# Patient Record
Sex: MALE | Race: WHITE | Employment: OTHER | URBAN - NONMETROPOLITAN AREA
[De-identification: names, ages, dates, MRNs, and addresses within clinical notes are randomized per-mention and may not be internally consistent; named-entity substitution may affect disease eponyms.]

---

## 2017-01-18 ENCOUNTER — OFFICE VISIT (OUTPATIENT)
Dept: FAMILY MEDICINE CLINIC | Age: 67
End: 2017-01-18
Payer: MEDICARE

## 2017-01-18 VITALS
SYSTOLIC BLOOD PRESSURE: 120 MMHG | WEIGHT: 190 LBS | DIASTOLIC BLOOD PRESSURE: 70 MMHG | HEART RATE: 68 BPM | RESPIRATION RATE: 16 BRPM | TEMPERATURE: 97.7 F | BODY MASS INDEX: 28.06 KG/M2

## 2017-01-18 DIAGNOSIS — E78.2 MIXED HYPERLIPIDEMIA: ICD-10-CM

## 2017-01-18 DIAGNOSIS — R74.8 INCREASED LIVER ENZYMES: ICD-10-CM

## 2017-01-18 DIAGNOSIS — I10 ESSENTIAL HYPERTENSION: ICD-10-CM

## 2017-01-18 DIAGNOSIS — G47.00 INSOMNIA, UNSPECIFIED TYPE: ICD-10-CM

## 2017-01-18 DIAGNOSIS — S32.009D CLOSED FRACTURE OF LUMBAR VERTEBRA WITH ROUTINE HEALING, UNSPECIFIED FRACTURE MORPHOLOGY, UNSPECIFIED LUMBAR VERTEBRAL LEVEL, SUBSEQUENT ENCOUNTER: Primary | ICD-10-CM

## 2017-01-18 DIAGNOSIS — Z12.9 CANCER SCREENING: ICD-10-CM

## 2017-01-18 LAB
ALBUMIN SERPL-MCNC: 3.6 G/DL (ref 3.5–5.2)
ALP BLD-CCNC: 78 U/L (ref 40–130)
ALT SERPL-CCNC: 26 U/L (ref 5–41)
ANION GAP SERPL CALCULATED.3IONS-SCNC: 14 MMOL/L (ref 7–19)
AST SERPL-CCNC: 27 U/L (ref 5–40)
BILIRUB SERPL-MCNC: 0.4 MG/DL (ref 0.2–1.2)
BILIRUBIN URINE: NEGATIVE
BLOOD, URINE: NEGATIVE
BUN BLDV-MCNC: 37 MG/DL (ref 8–23)
CALCIUM SERPL-MCNC: 8.8 MG/DL (ref 8.8–10.2)
CHLORIDE BLD-SCNC: 99 MMOL/L (ref 98–111)
CHOLESTEROL, TOTAL: 130 MG/DL (ref 160–199)
CLARITY: CLEAR
CO2: 27 MMOL/L (ref 22–29)
COLOR: YELLOW
CREAT SERPL-MCNC: 1.7 MG/DL (ref 0.5–1.2)
GFR NON-AFRICAN AMERICAN: 40
GLOBULIN: 3.3 G/DL
GLUCOSE BLD-MCNC: 81 MG/DL (ref 74–109)
GLUCOSE URINE: NEGATIVE MG/DL
HCT VFR BLD CALC: 33.6 % (ref 42–52)
HDLC SERPL-MCNC: 26 MG/DL (ref 55–121)
HEMOGLOBIN: 11.1 G/DL (ref 14–18)
HEPATITIS C ANTIBODY INTERPRETATION: NORMAL
KETONES, URINE: NEGATIVE MG/DL
LDL CHOLESTEROL CALCULATED: 59 MG/DL
LEUKOCYTE ESTERASE, URINE: NEGATIVE
MCH RBC QN AUTO: 31.2 PG (ref 27–31)
MCHC RBC AUTO-ENTMCNC: 33 G/DL (ref 33–37)
MCV RBC AUTO: 94.4 FL (ref 80–94)
NITRITE, URINE: NEGATIVE
PDW BLD-RTO: 13.5 % (ref 11.5–14.5)
PH UA: 5.5
PLATELET # BLD: 216 K/UL (ref 130–400)
PMV BLD AUTO: 10.1 FL (ref 7.4–10.4)
POTASSIUM SERPL-SCNC: 4.7 MMOL/L (ref 3.5–5)
PROSTATE SPECIFIC ANTIGEN: 7.45 NG/ML (ref 0–4)
PROTEIN UA: NEGATIVE MG/DL
RBC # BLD: 3.56 M/UL (ref 4.7–6.1)
SODIUM BLD-SCNC: 140 MMOL/L (ref 136–145)
SPECIFIC GRAVITY UA: 1.02
TOTAL PROTEIN: 6.9 G/DL (ref 6.6–8.7)
TRIGL SERPL-MCNC: 223 MG/DL (ref 150–199)
UROBILINOGEN, URINE: 1 E.U./DL
WBC # BLD: 5.3 K/UL (ref 4.8–10.8)

## 2017-01-18 PROCEDURE — 99214 OFFICE O/P EST MOD 30 MIN: CPT | Performed by: FAMILY MEDICINE

## 2017-01-18 PROCEDURE — 93000 ELECTROCARDIOGRAM COMPLETE: CPT | Performed by: FAMILY MEDICINE

## 2017-01-18 RX ORDER — ZOLPIDEM TARTRATE 10 MG/1
10 TABLET ORAL NIGHTLY PRN
Qty: 30 TABLET | Refills: 1 | Status: SHIPPED | OUTPATIENT
Start: 2017-01-18 | End: 2017-04-27 | Stop reason: SDUPTHER

## 2017-01-18 RX ORDER — METHOCARBAMOL 500 MG/1
500 TABLET, FILM COATED ORAL 3 TIMES DAILY
Qty: 30 TABLET | Refills: 0 | Status: SHIPPED | OUTPATIENT
Start: 2017-01-18 | End: 2017-01-28

## 2017-01-18 ASSESSMENT — ENCOUNTER SYMPTOMS
EYES NEGATIVE: 1
RESPIRATORY NEGATIVE: 1
ALLERGIC/IMMUNOLOGIC NEGATIVE: 1
BACK PAIN: 1
GASTROINTESTINAL NEGATIVE: 1

## 2017-01-19 DIAGNOSIS — N28.9 RENAL INSUFFICIENCY: Primary | ICD-10-CM

## 2017-02-01 RX ORDER — LOSARTAN POTASSIUM 100 MG/1
TABLET ORAL
Qty: 30 TABLET | Refills: 3 | Status: SHIPPED | OUTPATIENT
Start: 2017-02-01 | End: 2017-05-26 | Stop reason: SDUPTHER

## 2017-02-01 RX ORDER — ATORVASTATIN CALCIUM 40 MG/1
TABLET, FILM COATED ORAL
Qty: 30 TABLET | Refills: 3 | Status: SHIPPED | OUTPATIENT
Start: 2017-02-01 | End: 2017-07-25 | Stop reason: SDUPTHER

## 2017-04-07 ENCOUNTER — APPOINTMENT (OUTPATIENT)
Dept: MRI IMAGING | Facility: HOSPITAL | Age: 67
End: 2017-04-07

## 2017-04-07 ENCOUNTER — APPOINTMENT (OUTPATIENT)
Dept: CT IMAGING | Facility: HOSPITAL | Age: 67
End: 2017-04-07

## 2017-04-07 ENCOUNTER — APPOINTMENT (OUTPATIENT)
Dept: NUCLEAR MEDICINE | Facility: HOSPITAL | Age: 67
End: 2017-04-07

## 2017-04-07 ENCOUNTER — HOSPITAL ENCOUNTER (EMERGENCY)
Facility: HOSPITAL | Age: 67
Discharge: HOME | End: 2017-04-07
Attending: EMERGENCY MEDICINE | Admitting: EMERGENCY MEDICINE

## 2017-04-07 ENCOUNTER — APPOINTMENT (OUTPATIENT)
Dept: GENERAL RADIOLOGY | Facility: HOSPITAL | Age: 67
End: 2017-04-07

## 2017-04-07 VITALS
DIASTOLIC BLOOD PRESSURE: 76 MMHG | BODY MASS INDEX: 27.92 KG/M2 | RESPIRATION RATE: 16 BRPM | HEIGHT: 70 IN | OXYGEN SATURATION: 97 % | SYSTOLIC BLOOD PRESSURE: 131 MMHG | TEMPERATURE: 98 F | HEART RATE: 80 BPM | WEIGHT: 195 LBS

## 2017-04-07 DIAGNOSIS — R79.89 ELEVATED SERUM CREATININE: ICD-10-CM

## 2017-04-07 DIAGNOSIS — R79.9 ELEVATED BUN: Primary | ICD-10-CM

## 2017-04-07 DIAGNOSIS — N28.9 ACUTE RENAL INSUFFICIENCY: ICD-10-CM

## 2017-04-07 DIAGNOSIS — M54.42 CHRONIC MIDLINE LOW BACK PAIN WITH BILATERAL SCIATICA: ICD-10-CM

## 2017-04-07 DIAGNOSIS — G89.29 CHRONIC MIDLINE LOW BACK PAIN WITH BILATERAL SCIATICA: ICD-10-CM

## 2017-04-07 DIAGNOSIS — M54.41 CHRONIC MIDLINE LOW BACK PAIN WITH BILATERAL SCIATICA: ICD-10-CM

## 2017-04-07 LAB
ALBUMIN SERPL-MCNC: 3.6 G/DL (ref 3.5–5)
ALBUMIN/GLOB SERPL: 1.2 G/DL (ref 1.1–2.5)
ALP SERPL-CCNC: 69 U/L (ref 24–120)
ALT SERPL W P-5'-P-CCNC: 30 U/L (ref 0–54)
ANION GAP SERPL CALCULATED.3IONS-SCNC: 7 MMOL/L (ref 4–13)
APTT PPP: 39.9 SECONDS (ref 24.1–34.8)
AST SERPL-CCNC: 43 U/L (ref 7–45)
BASOPHILS # BLD AUTO: 0.02 10*3/MM3 (ref 0–0.2)
BASOPHILS NFR BLD AUTO: 0.4 % (ref 0–2)
BILIRUB SERPL-MCNC: 0.6 MG/DL (ref 0.1–1)
BILIRUB UR QL STRIP: NEGATIVE
BUN BLD-MCNC: 48 MG/DL (ref 5–21)
BUN/CREAT SERPL: 25.5 (ref 7–25)
CALCIUM SPEC-SCNC: 9.1 MG/DL (ref 8.4–10.4)
CHLORIDE SERPL-SCNC: 98 MMOL/L (ref 98–110)
CK MB SERPL-CCNC: 7.49 NG/ML (ref 0–5)
CK MB SERPL-RTO: 2.7 % (ref 0–5.7)
CK SERPL-CCNC: 273 U/L (ref 0–203)
CLARITY UR: CLEAR
CO2 SERPL-SCNC: 35 MMOL/L (ref 24–31)
COLOR UR: YELLOW
CREAT BLD-MCNC: 1.88 MG/DL (ref 0.5–1.4)
D DIMER PPP FEU-MCNC: 0.6 MG/L (FEU) (ref 0–0.5)
DEPRECATED RDW RBC AUTO: 50 FL (ref 40–54)
EOSINOPHIL # BLD AUTO: 0.14 10*3/MM3 (ref 0–0.7)
EOSINOPHIL NFR BLD AUTO: 2.7 % (ref 0–4)
ERYTHROCYTE [DISTWIDTH] IN BLOOD BY AUTOMATED COUNT: 14.9 % (ref 12–15)
GFR SERPL CREATININE-BSD FRML MDRD: 36 ML/MIN/1.73
GLOBULIN UR ELPH-MCNC: 3.1 GM/DL
GLUCOSE BLD-MCNC: 112 MG/DL (ref 70–100)
GLUCOSE UR STRIP-MCNC: NEGATIVE MG/DL
HCT VFR BLD AUTO: 32.7 % (ref 40–52)
HGB BLD-MCNC: 10.6 G/DL (ref 14–18)
HGB UR QL STRIP.AUTO: NEGATIVE
HOLD SPECIMEN: NORMAL
HOLD SPECIMEN: NORMAL
IMM GRANULOCYTES # BLD: 0.01 10*3/MM3 (ref 0–0.03)
IMM GRANULOCYTES NFR BLD: 0.2 % (ref 0–5)
INR PPP: 1.06 (ref 0.91–1.09)
KETONES UR QL STRIP: NEGATIVE
LEUKOCYTE ESTERASE UR QL STRIP.AUTO: NEGATIVE
LYMPHOCYTES # BLD AUTO: 1.22 10*3/MM3 (ref 0.72–4.86)
LYMPHOCYTES NFR BLD AUTO: 23.3 % (ref 15–45)
MAGNESIUM SERPL-MCNC: 1.8 MG/DL (ref 1.4–2.2)
MCH RBC QN AUTO: 30.5 PG (ref 28–32)
MCHC RBC AUTO-ENTMCNC: 32.4 G/DL (ref 33–36)
MCV RBC AUTO: 94.2 FL (ref 82–95)
MONOCYTES # BLD AUTO: 0.36 10*3/MM3 (ref 0.19–1.3)
MONOCYTES NFR BLD AUTO: 6.9 % (ref 4–12)
MYOGLOBIN SERPL-MCNC: 224.6 NG/ML (ref 0–110)
NEUTROPHILS # BLD AUTO: 3.48 10*3/MM3 (ref 1.87–8.4)
NEUTROPHILS NFR BLD AUTO: 66.5 % (ref 39–78)
NITRITE UR QL STRIP: NEGATIVE
NRBC BLD MANUAL-RTO: 0 /100 WBC (ref 0–0)
NT-PROBNP SERPL-MCNC: 113 PG/ML (ref 0–900)
PH UR STRIP.AUTO: <=5 [PH] (ref 5–8)
PLATELET # BLD AUTO: 117 10*3/MM3 (ref 130–400)
PMV BLD AUTO: 10.3 FL (ref 6–12)
POTASSIUM BLD-SCNC: 4.2 MMOL/L (ref 3.5–5.3)
PROT SERPL-MCNC: 6.7 G/DL (ref 6.3–8.7)
PROT UR QL STRIP: NEGATIVE
PROTHROMBIN TIME: 14.1 SECONDS (ref 11.9–14.6)
RBC # BLD AUTO: 3.47 10*6/MM3 (ref 4.8–5.9)
RBC MORPH BLD: NORMAL
SMALL PLATELETS BLD QL SMEAR: NORMAL
SODIUM BLD-SCNC: 140 MMOL/L (ref 135–145)
SP GR UR STRIP: 1.02 (ref 1–1.03)
TROPONIN I SERPL-MCNC: 0 NG/ML (ref 0–0.07)
TROPONIN I SERPL-MCNC: <0.012 NG/ML (ref 0–0.03)
UROBILINOGEN UR QL STRIP: NORMAL
WBC MORPH BLD: NORMAL
WBC NRBC COR # BLD: 5.23 10*3/MM3 (ref 4.8–10.8)
WHOLE BLOOD HOLD SPECIMEN: NORMAL
WHOLE BLOOD HOLD SPECIMEN: NORMAL

## 2017-04-07 PROCEDURE — 85610 PROTHROMBIN TIME: CPT | Performed by: EMERGENCY MEDICINE

## 2017-04-07 PROCEDURE — 0 TECHNETIUM ALBUMIN AGGREGATED: Performed by: EMERGENCY MEDICINE

## 2017-04-07 PROCEDURE — 85025 COMPLETE CBC W/AUTO DIFF WBC: CPT | Performed by: EMERGENCY MEDICINE

## 2017-04-07 PROCEDURE — 70450 CT HEAD/BRAIN W/O DYE: CPT

## 2017-04-07 PROCEDURE — 0 GADOBENATE DIMEGLUMINE 529 MG/ML SOLUTION: Performed by: EMERGENCY MEDICINE

## 2017-04-07 PROCEDURE — 83880 ASSAY OF NATRIURETIC PEPTIDE: CPT | Performed by: EMERGENCY MEDICINE

## 2017-04-07 PROCEDURE — 84484 ASSAY OF TROPONIN QUANT: CPT

## 2017-04-07 PROCEDURE — 72158 MRI LUMBAR SPINE W/O & W/DYE: CPT

## 2017-04-07 PROCEDURE — 82550 ASSAY OF CK (CPK): CPT | Performed by: EMERGENCY MEDICINE

## 2017-04-07 PROCEDURE — 85007 BL SMEAR W/DIFF WBC COUNT: CPT | Performed by: EMERGENCY MEDICINE

## 2017-04-07 PROCEDURE — 81003 URINALYSIS AUTO W/O SCOPE: CPT | Performed by: EMERGENCY MEDICINE

## 2017-04-07 PROCEDURE — A9558 XE133 XENON 10MCI: HCPCS | Performed by: EMERGENCY MEDICINE

## 2017-04-07 PROCEDURE — 85379 FIBRIN DEGRADATION QUANT: CPT | Performed by: EMERGENCY MEDICINE

## 2017-04-07 PROCEDURE — 83735 ASSAY OF MAGNESIUM: CPT | Performed by: EMERGENCY MEDICINE

## 2017-04-07 PROCEDURE — 93010 ELECTROCARDIOGRAM REPORT: CPT | Performed by: INTERNAL MEDICINE

## 2017-04-07 PROCEDURE — 72131 CT LUMBAR SPINE W/O DYE: CPT

## 2017-04-07 PROCEDURE — 83874 ASSAY OF MYOGLOBIN: CPT | Performed by: EMERGENCY MEDICINE

## 2017-04-07 PROCEDURE — 93005 ELECTROCARDIOGRAM TRACING: CPT | Performed by: EMERGENCY MEDICINE

## 2017-04-07 PROCEDURE — 80053 COMPREHEN METABOLIC PANEL: CPT | Performed by: EMERGENCY MEDICINE

## 2017-04-07 PROCEDURE — 99284 EMERGENCY DEPT VISIT MOD MDM: CPT

## 2017-04-07 PROCEDURE — 0 XENON XE 133: Performed by: EMERGENCY MEDICINE

## 2017-04-07 PROCEDURE — 72125 CT NECK SPINE W/O DYE: CPT

## 2017-04-07 PROCEDURE — 71010 HC CHEST PA OR AP: CPT

## 2017-04-07 PROCEDURE — A9577 INJ MULTIHANCE: HCPCS | Performed by: EMERGENCY MEDICINE

## 2017-04-07 PROCEDURE — 85730 THROMBOPLASTIN TIME PARTIAL: CPT | Performed by: EMERGENCY MEDICINE

## 2017-04-07 PROCEDURE — 84484 ASSAY OF TROPONIN QUANT: CPT | Performed by: EMERGENCY MEDICINE

## 2017-04-07 PROCEDURE — 82553 CREATINE MB FRACTION: CPT | Performed by: EMERGENCY MEDICINE

## 2017-04-07 PROCEDURE — A9540 TC99M MAA: HCPCS | Performed by: EMERGENCY MEDICINE

## 2017-04-07 PROCEDURE — 78582 LUNG VENTILAT&PERFUS IMAGING: CPT

## 2017-04-07 RX ORDER — LOSARTAN POTASSIUM 50 MG/1
100 TABLET ORAL DAILY
COMMUNITY
End: 2018-03-07 | Stop reason: HOSPADM

## 2017-04-07 RX ORDER — CHLORTHALIDONE 25 MG/1
25 TABLET ORAL DAILY
Status: ON HOLD | COMMUNITY
End: 2018-03-02

## 2017-04-07 RX ORDER — CELECOXIB 200 MG/1
200 CAPSULE ORAL DAILY
Status: ON HOLD | COMMUNITY
End: 2018-03-02

## 2017-04-07 RX ORDER — POTASSIUM CHLORIDE 750 MG/1
10 CAPSULE, EXTENDED RELEASE ORAL 2 TIMES DAILY
COMMUNITY
End: 2018-03-07 | Stop reason: HOSPADM

## 2017-04-07 RX ORDER — OXYCODONE HYDROCHLORIDE 15 MG/1
15 TABLET ORAL EVERY 8 HOURS PRN
COMMUNITY
End: 2018-03-02 | Stop reason: HOSPADM

## 2017-04-07 RX ORDER — ALPRAZOLAM 1 MG/1
1 TABLET ORAL DAILY
COMMUNITY
End: 2019-03-08

## 2017-04-07 RX ORDER — GABAPENTIN 300 MG/1
900 CAPSULE ORAL NIGHTLY
COMMUNITY
End: 2018-06-19 | Stop reason: ALTCHOICE

## 2017-04-07 RX ORDER — MELOXICAM 15 MG/1
15 TABLET ORAL DAILY
Status: ON HOLD | COMMUNITY
End: 2018-02-27

## 2017-04-07 RX ORDER — ATORVASTATIN CALCIUM 40 MG/1
40 TABLET, FILM COATED ORAL DAILY
COMMUNITY
End: 2018-03-08

## 2017-04-07 RX ADMIN — Medication 19.8 MILLICURIE: at 15:47

## 2017-04-07 RX ADMIN — GADOBENATE DIMEGLUMINE 17 ML: 529 INJECTION, SOLUTION INTRAVENOUS at 18:20

## 2017-04-07 RX ADMIN — Medication 1 DOSE: at 15:52

## 2017-04-07 NOTE — ED PROVIDER NOTES
Subjective   HPI Comments: Patient is a 66-year-old male with chronic low back pain which chronically radiates to both legs.  He reports he also has chronic shortness of breath.  Patient reports that since yesterday at about 4 PM his shortness of breath has been worse and he has had generalized weakness.  He reports that all this is worsened by walking and relieved by nothing.      History provided by:  Patient      Review of Systems   HENT: Negative.    Eyes: Negative.    Respiratory: Positive for shortness of breath (Chronic but worse since yesterday.). Negative for apnea, cough, choking and chest tightness.    Cardiovascular: Negative.    Gastrointestinal: Negative.    Endocrine: Negative.    Genitourinary: Negative.    Musculoskeletal: Positive for back pain (Chronic low back pain that radiates to both legs.).        Patient denies bowel/bladder dysfunction, etc.   Skin: Negative.    Allergic/Immunologic: Negative.    Neurological: Positive for weakness (Chronic weakness of left lower extremity. Generalized weakness since yesterday.).   Hematological: Negative.    Psychiatric/Behavioral: Negative.    All other systems reviewed and are negative.      Past Medical History:   Diagnosis Date   • Injury of back    • Neck injury        Allergies   Allergen Reactions   • Codeine Itching       Past Surgical History:   Procedure Laterality Date   • BACK SURGERY         History reviewed. No pertinent family history.    Social History     Social History   • Marital status: Single     Spouse name: N/A   • Number of children: N/A   • Years of education: N/A     Social History Main Topics   • Smoking status: Never Smoker   • Smokeless tobacco: None   • Alcohol use Yes   • Drug use: No   • Sexual activity: Not Asked     Other Topics Concern   • None     Social History Narrative   • None           Objective   Physical Exam   Constitutional: He is oriented to person, place, and time. He appears well-developed and well-nourished.    Elderly appearing male.   HENT:   Head: Normocephalic and atraumatic.   Right Ear: External ear normal.   Left Ear: External ear normal.   Nose: Nose normal.   Mouth/Throat: Oropharynx is clear and moist.   Eyes: Conjunctivae and EOM are normal. Pupils are equal, round, and reactive to light. Right eye exhibits no discharge. Left eye exhibits no discharge.   Neck: Normal range of motion. Neck supple. No tracheal deviation present. No thyromegaly present.   Cardiovascular: Normal rate, regular rhythm, normal heart sounds and intact distal pulses.    No murmur heard.  Pulmonary/Chest: Effort normal and breath sounds normal. No respiratory distress. He exhibits no tenderness.   Abdominal: Soft. He exhibits no distension. There is no tenderness.   Musculoskeletal: He exhibits tenderness (Very mild pain to palpation of the midline of the lower lumbar spine.). He exhibits no edema or deformity.   Mild generalized weakness.   Neurological: He is alert and oriented to person, place, and time. No cranial nerve deficit.   Skin: Skin is warm and dry. No erythema. No pallor.   Psychiatric: He has a normal mood and affect. Judgment normal.   Nursing note and vitals reviewed.      ECG 12 Lead    Date/Time: 4/7/2017 2:01 PM  Performed by: SOTO WU  Authorized by: SOTO WU   Interpreted by physician  Rhythm: sinus rhythm  BPM: 89  QRS axis: left        ECG 12 Lead    Date/Time: 4/7/2017 8:46 PM  Performed by: SOTO WU  Authorized by: SOTO WU   Interpreted by physician  Rhythm: sinus rhythm  BPM: 79  Comments: PACs               ED Course  ED Course   Comment By Time   Patient's case is is been discussed with Dr. Virgil Ibanez (neurosurgeon) who has reviewed the patient's MRI of the lumbar spine.  Dr. Ibanez recommended the patient follow-up with his previous surgeon or pain management as Dr. Ibanez does not feel like the patient has any immediate or urgent surgery needed. Soto  Howard Fisher MD 04/07 2133   Patient was given the option to be admitted for further evaluation and IV fluids given his elevated BUN/creatinine, etc.  Patient reports that he wants to be discharged home at this time and he definitely does not want to be admitted to this hospital at this emergency department visit. Soto Fisher MD 04/07 2143                  MDM  Number of Diagnoses or Management Options  Acute renal insufficiency: new and requires workup  Chronic midline low back pain with bilateral sciatica: established and worsening  Elevated BUN: new and requires workup  Elevated serum creatinine: new and requires workup     Amount and/or Complexity of Data Reviewed  Clinical lab tests: ordered and reviewed  Tests in the radiology section of CPT®: ordered and reviewed  Discuss the patient with other providers: yes    Risk of Complications, Morbidity, and/or Mortality  Presenting problems: moderate  Diagnostic procedures: moderate  Management options: low    Patient Progress  Patient progress: stable      Final diagnoses:   Elevated BUN   Elevated serum creatinine   Acute renal insufficiency   Chronic midline low back pain with bilateral sciatica            Soto Fisher MD  04/07/17 2609

## 2017-04-07 NOTE — ED NOTES
Patient attempted to urinate using urinal with no success.     Cristela Castro RN  04/07/17 7256

## 2017-04-19 DIAGNOSIS — N28.9 RENAL INSUFFICIENCY: ICD-10-CM

## 2017-04-19 LAB
ANION GAP SERPL CALCULATED.3IONS-SCNC: 16 MMOL/L (ref 7–19)
BUN BLDV-MCNC: 28 MG/DL (ref 8–23)
CALCIUM SERPL-MCNC: 9.1 MG/DL (ref 8.8–10.2)
CHLORIDE BLD-SCNC: 102 MMOL/L (ref 98–111)
CO2: 25 MMOL/L (ref 22–29)
CREAT SERPL-MCNC: 1.4 MG/DL (ref 0.5–1.2)
GFR NON-AFRICAN AMERICAN: 51
GLUCOSE BLD-MCNC: 85 MG/DL (ref 74–109)
POTASSIUM SERPL-SCNC: 4.4 MMOL/L (ref 3.5–5)
SODIUM BLD-SCNC: 143 MMOL/L (ref 136–145)

## 2017-05-15 ENCOUNTER — OFFICE VISIT (OUTPATIENT)
Dept: FAMILY MEDICINE CLINIC | Age: 67
End: 2017-05-15
Payer: MEDICARE

## 2017-05-15 VITALS
DIASTOLIC BLOOD PRESSURE: 70 MMHG | OXYGEN SATURATION: 97 % | TEMPERATURE: 98.2 F | WEIGHT: 192 LBS | SYSTOLIC BLOOD PRESSURE: 122 MMHG | HEART RATE: 79 BPM | BODY MASS INDEX: 28.35 KG/M2

## 2017-05-15 DIAGNOSIS — N18.4 CKD (CHRONIC KIDNEY DISEASE), STAGE 4 (SEVERE): ICD-10-CM

## 2017-05-15 DIAGNOSIS — R97.20 INCREASED PROSTATE SPECIFIC ANTIGEN (PSA) VELOCITY: ICD-10-CM

## 2017-05-15 DIAGNOSIS — Z12.11 COLON CANCER SCREENING: ICD-10-CM

## 2017-05-15 DIAGNOSIS — G89.4 CHRONIC PAIN SYNDROME: ICD-10-CM

## 2017-05-15 DIAGNOSIS — K59.03 DRUG-INDUCED CONSTIPATION: Primary | ICD-10-CM

## 2017-05-15 DIAGNOSIS — F41.9 ANXIETY: ICD-10-CM

## 2017-05-15 DIAGNOSIS — G47.00 INSOMNIA, UNSPECIFIED TYPE: ICD-10-CM

## 2017-05-15 LAB
ALBUMIN SERPL-MCNC: 4.4 G/DL (ref 3.5–5.2)
ALP BLD-CCNC: 115 U/L (ref 40–130)
ALT SERPL-CCNC: 17 U/L (ref 5–41)
ANION GAP SERPL CALCULATED.3IONS-SCNC: 12 MMOL/L (ref 7–19)
AST SERPL-CCNC: 25 U/L (ref 5–40)
BILIRUB SERPL-MCNC: 0.7 MG/DL (ref 0.2–1.2)
BUN BLDV-MCNC: 28 MG/DL (ref 8–23)
CALCIUM SERPL-MCNC: 9.1 MG/DL (ref 8.8–10.2)
CHLORIDE BLD-SCNC: 98 MMOL/L (ref 98–111)
CO2: 27 MMOL/L (ref 22–29)
CREAT SERPL-MCNC: 1.1 MG/DL (ref 0.5–1.2)
GFR NON-AFRICAN AMERICAN: >60
GLOBULIN: 3 G/DL
GLUCOSE BLD-MCNC: 95 MG/DL (ref 74–109)
HCT VFR BLD CALC: 41.1 % (ref 42–52)
HEMOGLOBIN: 13.6 G/DL (ref 14–18)
MCH RBC QN AUTO: 31.3 PG (ref 27–31)
MCHC RBC AUTO-ENTMCNC: 33.1 G/DL (ref 33–37)
MCV RBC AUTO: 94.7 FL (ref 80–94)
PDW BLD-RTO: 13.6 % (ref 11.5–14.5)
PLATELET # BLD: 161 K/UL (ref 130–400)
PMV BLD AUTO: 10.4 FL (ref 7.4–10.4)
POTASSIUM SERPL-SCNC: 4.6 MMOL/L (ref 3.5–5)
PROSTATE SPECIFIC ANTIGEN: 5.67 NG/ML (ref 0–4)
RBC # BLD: 4.34 M/UL (ref 4.7–6.1)
SODIUM BLD-SCNC: 137 MMOL/L (ref 136–145)
TOTAL PROTEIN: 7.4 G/DL (ref 6.6–8.7)
WBC # BLD: 5.8 K/UL (ref 4.8–10.8)

## 2017-05-15 PROCEDURE — 99214 OFFICE O/P EST MOD 30 MIN: CPT | Performed by: FAMILY MEDICINE

## 2017-05-15 RX ORDER — ZOLPIDEM TARTRATE 10 MG/1
TABLET ORAL
Qty: 30 TABLET | Refills: 1 | Status: SHIPPED | OUTPATIENT
Start: 2017-05-15 | End: 2017-05-15 | Stop reason: ALTCHOICE

## 2017-05-15 ASSESSMENT — ENCOUNTER SYMPTOMS
RESPIRATORY NEGATIVE: 1
ALLERGIC/IMMUNOLOGIC NEGATIVE: 1
EYES NEGATIVE: 1
CONSTIPATION: 1

## 2017-05-25 DIAGNOSIS — R97.20 ELEVATED PSA: Primary | ICD-10-CM

## 2017-06-06 ENCOUNTER — TELEPHONE (OUTPATIENT)
Dept: UROLOGY | Facility: CLINIC | Age: 67
End: 2017-06-06

## 2017-06-06 NOTE — TELEPHONE ENCOUNTER
CALLED PT TO SCHEDULE APT.  PT STATES THAT HE JUST HAD SX AND WAS NOT ABLE TO MAKE APT RIGHT NOW.  HE WILL CALL TO RS APT WHEN HE IS FEELING BETTER.

## 2017-07-25 RX ORDER — ATORVASTATIN CALCIUM 40 MG/1
TABLET, FILM COATED ORAL
Qty: 30 TABLET | Refills: 5 | Status: SHIPPED | OUTPATIENT
Start: 2017-07-25 | End: 2018-03-09 | Stop reason: ALTCHOICE

## 2017-07-25 RX ORDER — POTASSIUM CHLORIDE 750 MG/1
TABLET, FILM COATED, EXTENDED RELEASE ORAL
Qty: 60 TABLET | Refills: 5 | Status: SHIPPED | OUTPATIENT
Start: 2017-07-25 | End: 2018-03-09 | Stop reason: ALTCHOICE

## 2017-08-09 ENCOUNTER — HOSPITAL ENCOUNTER (OUTPATIENT)
Dept: GENERAL RADIOLOGY | Age: 67
Discharge: HOME | End: 2017-08-09
Payer: MEDICARE

## 2017-08-09 ENCOUNTER — OFFICE VISIT (OUTPATIENT)
Dept: FAMILY MEDICINE CLINIC | Age: 67
End: 2017-08-09
Payer: MEDICARE

## 2017-08-09 VITALS
DIASTOLIC BLOOD PRESSURE: 60 MMHG | OXYGEN SATURATION: 96 % | HEART RATE: 100 BPM | TEMPERATURE: 97.5 F | SYSTOLIC BLOOD PRESSURE: 120 MMHG | BODY MASS INDEX: 29.53 KG/M2 | WEIGHT: 200 LBS

## 2017-08-09 DIAGNOSIS — R60.0 BILATERAL EDEMA OF LOWER EXTREMITY: ICD-10-CM

## 2017-08-09 DIAGNOSIS — G47.00 INSOMNIA, UNSPECIFIED TYPE: ICD-10-CM

## 2017-08-09 DIAGNOSIS — R97.20 INCREASED PROSTATE SPECIFIC ANTIGEN (PSA) VELOCITY: ICD-10-CM

## 2017-08-09 DIAGNOSIS — Z79.899 MEDICATION MANAGEMENT: ICD-10-CM

## 2017-08-09 DIAGNOSIS — K59.00 CONSTIPATION, UNSPECIFIED CONSTIPATION TYPE: Primary | ICD-10-CM

## 2017-08-09 LAB
ALBUMIN SERPL-MCNC: 3.6 G/DL (ref 3.5–5.2)
ALP BLD-CCNC: 115 U/L (ref 40–130)
ALT SERPL-CCNC: 13 U/L (ref 5–41)
AMPHETAMINE SCREEN, URINE: ABNORMAL
ANION GAP SERPL CALCULATED.3IONS-SCNC: 15 MMOL/L (ref 7–19)
AST SERPL-CCNC: 25 U/L (ref 5–40)
BARBITURATE SCREEN, URINE: ABNORMAL
BENZODIAZEPINE SCREEN, URINE: POSITIVE
BILIRUB SERPL-MCNC: 0.4 MG/DL (ref 0.2–1.2)
BUN BLDV-MCNC: 21 MG/DL (ref 8–23)
CALCIUM SERPL-MCNC: 9.2 MG/DL (ref 8.8–10.2)
CHLORIDE BLD-SCNC: 97 MMOL/L (ref 98–111)
CO2: 29 MMOL/L (ref 22–29)
COCAINE METABOLITE SCREEN URINE: ABNORMAL
CREAT SERPL-MCNC: 1.6 MG/DL (ref 0.5–1.2)
GFR NON-AFRICAN AMERICAN: 43
GLUCOSE BLD-MCNC: 124 MG/DL (ref 74–109)
HCT VFR BLD CALC: 34.4 % (ref 42–52)
HEMOGLOBIN: 10.9 G/DL (ref 14–18)
MCH RBC QN AUTO: 31.2 PG (ref 27–31)
MCHC RBC AUTO-ENTMCNC: 31.7 G/DL (ref 33–37)
MCV RBC AUTO: 98.6 FL (ref 80–94)
MDMA URINE: ABNORMAL
METHADONE SCREEN, URINE: ABNORMAL
METHAMPHETAMINE, URINE: ABNORMAL
OPIATE SCREEN URINE: ABNORMAL
OXYCODONE SCREEN URINE: POSITIVE
PDW BLD-RTO: 14.9 % (ref 11.5–14.5)
PHENCYCLIDINE SCREEN URINE: ABNORMAL
PLATELET # BLD: 161 K/UL (ref 130–400)
PMV BLD AUTO: 10 FL (ref 9.4–12.4)
POTASSIUM SERPL-SCNC: 4.5 MMOL/L (ref 3.5–5)
PROPOXYPHENE SCREEN, URINE: ABNORMAL
RBC # BLD: 3.49 M/UL (ref 4.7–6.1)
SODIUM BLD-SCNC: 141 MMOL/L (ref 136–145)
THC: ABNORMAL
TOTAL PROTEIN: 6.9 G/DL (ref 6.6–8.7)
TRICYCLIC ANTIDEPRESSANTS, UR: ABNORMAL
WBC # BLD: 4.6 K/UL (ref 4.8–10.8)

## 2017-08-09 PROCEDURE — 80305 DRUG TEST PRSMV DIR OPT OBS: CPT | Performed by: FAMILY MEDICINE

## 2017-08-09 PROCEDURE — 93971 EXTREMITY STUDY: CPT

## 2017-08-09 PROCEDURE — 99214 OFFICE O/P EST MOD 30 MIN: CPT | Performed by: FAMILY MEDICINE

## 2017-08-09 RX ORDER — LACTULOSE 10 G/15ML
10 SOLUTION ORAL; RECTAL 2 TIMES DAILY
Qty: 2700 ML | Refills: 5 | Status: SHIPPED | OUTPATIENT
Start: 2017-08-09 | End: 2017-09-22

## 2017-08-09 RX ORDER — FUROSEMIDE 40 MG/1
40 TABLET ORAL DAILY
Qty: 30 TABLET | Refills: 1 | Status: SHIPPED | OUTPATIENT
Start: 2017-08-09 | End: 2017-11-20 | Stop reason: SDUPTHER

## 2017-08-09 RX ORDER — ZOLPIDEM TARTRATE 5 MG/1
5 TABLET ORAL NIGHTLY PRN
Qty: 30 TABLET | Refills: 0 | Status: SHIPPED | OUTPATIENT
Start: 2017-08-09 | End: 2018-02-07 | Stop reason: ALTCHOICE

## 2017-08-09 ASSESSMENT — ENCOUNTER SYMPTOMS
CONSTIPATION: 1
ALLERGIC/IMMUNOLOGIC NEGATIVE: 1
RESPIRATORY NEGATIVE: 1
EYES NEGATIVE: 1

## 2017-08-16 ENCOUNTER — OFFICE VISIT (OUTPATIENT)
Dept: FAMILY MEDICINE CLINIC | Age: 67
End: 2017-08-16
Payer: MEDICARE

## 2017-08-16 ENCOUNTER — HOSPITAL ENCOUNTER (OUTPATIENT)
Dept: GENERAL RADIOLOGY | Age: 67
Discharge: HOME | End: 2017-08-16
Payer: MEDICARE

## 2017-08-16 VITALS
DIASTOLIC BLOOD PRESSURE: 68 MMHG | HEIGHT: 70 IN | BODY MASS INDEX: 27.49 KG/M2 | RESPIRATION RATE: 16 BRPM | WEIGHT: 192 LBS | TEMPERATURE: 98 F | SYSTOLIC BLOOD PRESSURE: 112 MMHG | HEART RATE: 94 BPM | OXYGEN SATURATION: 94 %

## 2017-08-16 DIAGNOSIS — I82.890 SUPERFICIAL VEIN THROMBOSIS: ICD-10-CM

## 2017-08-16 DIAGNOSIS — R60.9 PERIPHERAL EDEMA: ICD-10-CM

## 2017-08-16 DIAGNOSIS — K59.03 DRUG-INDUCED CONSTIPATION: Primary | ICD-10-CM

## 2017-08-16 LAB
ANION GAP SERPL CALCULATED.3IONS-SCNC: 14 MMOL/L (ref 7–19)
BUN BLDV-MCNC: 25 MG/DL (ref 8–23)
CALCIUM SERPL-MCNC: 8.7 MG/DL (ref 8.8–10.2)
CHLORIDE BLD-SCNC: 97 MMOL/L (ref 98–111)
CO2: 27 MMOL/L (ref 22–29)
CREAT SERPL-MCNC: 1.7 MG/DL (ref 0.5–1.2)
GFR NON-AFRICAN AMERICAN: 40
GLUCOSE BLD-MCNC: 167 MG/DL (ref 74–109)
POTASSIUM SERPL-SCNC: 4.2 MMOL/L (ref 3.5–5)
SODIUM BLD-SCNC: 138 MMOL/L (ref 136–145)

## 2017-08-16 PROCEDURE — 93971 EXTREMITY STUDY: CPT

## 2017-08-16 PROCEDURE — 99214 OFFICE O/P EST MOD 30 MIN: CPT | Performed by: FAMILY MEDICINE

## 2017-08-16 ASSESSMENT — ENCOUNTER SYMPTOMS
CONSTIPATION: 1
RESPIRATORY NEGATIVE: 1
EYES NEGATIVE: 1
ALLERGIC/IMMUNOLOGIC NEGATIVE: 1

## 2017-08-25 DIAGNOSIS — N28.9 RENAL INSUFFICIENCY: Primary | ICD-10-CM

## 2017-08-29 RX ORDER — POTASSIUM CHLORIDE 750 MG/1
TABLET, FILM COATED, EXTENDED RELEASE ORAL
Qty: 60 TABLET | Refills: 5 | Status: SHIPPED | OUTPATIENT
Start: 2017-08-29 | End: 2017-09-22 | Stop reason: SDUPTHER

## 2017-09-22 ENCOUNTER — OFFICE VISIT (OUTPATIENT)
Dept: FAMILY MEDICINE CLINIC | Age: 67
End: 2017-09-22
Payer: MEDICARE

## 2017-09-22 VITALS
RESPIRATION RATE: 16 BRPM | HEART RATE: 75 BPM | TEMPERATURE: 98 F | SYSTOLIC BLOOD PRESSURE: 138 MMHG | WEIGHT: 193 LBS | BODY MASS INDEX: 27.69 KG/M2 | DIASTOLIC BLOOD PRESSURE: 80 MMHG | OXYGEN SATURATION: 95 %

## 2017-09-22 DIAGNOSIS — H61.23 BILATERAL IMPACTED CERUMEN: Primary | ICD-10-CM

## 2017-09-22 DIAGNOSIS — H92.03 OTALGIA OF BOTH EARS: ICD-10-CM

## 2017-09-22 PROCEDURE — 99213 OFFICE O/P EST LOW 20 MIN: CPT | Performed by: NURSE PRACTITIONER

## 2017-09-22 ASSESSMENT — ENCOUNTER SYMPTOMS: CONSTIPATION: 1

## 2017-10-31 ENCOUNTER — NURSE ONLY (OUTPATIENT)
Dept: FAMILY MEDICINE CLINIC | Age: 67
End: 2017-10-31
Payer: MEDICARE

## 2017-10-31 DIAGNOSIS — Z23 NEED FOR INFLUENZA VACCINATION: Primary | ICD-10-CM

## 2017-10-31 PROCEDURE — 90662 IIV NO PRSV INCREASED AG IM: CPT | Performed by: FAMILY MEDICINE

## 2017-10-31 PROCEDURE — G0008 ADMIN INFLUENZA VIRUS VAC: HCPCS | Performed by: FAMILY MEDICINE

## 2017-10-31 NOTE — PROGRESS NOTES
Flu shot only. No S/S of illness noted. Denies fever or allergy to egg products. No problem with previous vaccines. VIS given to patient.

## 2017-11-20 DIAGNOSIS — R60.0 BILATERAL EDEMA OF LOWER EXTREMITY: ICD-10-CM

## 2017-11-20 RX ORDER — LOSARTAN POTASSIUM 100 MG/1
TABLET ORAL
Qty: 30 TABLET | Refills: 5 | Status: SHIPPED | OUTPATIENT
Start: 2017-11-20 | End: 2018-03-09 | Stop reason: ALTCHOICE

## 2017-11-20 RX ORDER — FUROSEMIDE 40 MG/1
40 TABLET ORAL DAILY
Qty: 30 TABLET | Refills: 5 | Status: SHIPPED | OUTPATIENT
Start: 2017-11-20 | End: 2018-03-09 | Stop reason: ALTCHOICE

## 2018-02-07 ENCOUNTER — OFFICE VISIT (OUTPATIENT)
Dept: FAMILY MEDICINE CLINIC | Age: 68
End: 2018-02-07
Payer: MEDICARE

## 2018-02-07 VITALS
HEART RATE: 68 BPM | SYSTOLIC BLOOD PRESSURE: 114 MMHG | WEIGHT: 181 LBS | DIASTOLIC BLOOD PRESSURE: 68 MMHG | TEMPERATURE: 98.2 F | RESPIRATION RATE: 18 BRPM | BODY MASS INDEX: 25.91 KG/M2 | OXYGEN SATURATION: 97 % | HEIGHT: 70 IN

## 2018-02-07 DIAGNOSIS — I10 ESSENTIAL HYPERTENSION: ICD-10-CM

## 2018-02-07 DIAGNOSIS — M25.511 CHRONIC RIGHT SHOULDER PAIN: ICD-10-CM

## 2018-02-07 DIAGNOSIS — G89.29 CHRONIC RIGHT SHOULDER PAIN: ICD-10-CM

## 2018-02-07 DIAGNOSIS — G47.00 INSOMNIA, UNSPECIFIED TYPE: Primary | ICD-10-CM

## 2018-02-07 LAB
AMPHETAMINE SCREEN, URINE: ABNORMAL
BARBITURATE SCREEN, URINE: ABNORMAL
BENZODIAZEPINE SCREEN, URINE: ABNORMAL
COCAINE METABOLITE SCREEN URINE: ABNORMAL
MDMA URINE: ABNORMAL
METHADONE SCREEN, URINE: ABNORMAL
METHAMPHETAMINE, URINE: ABNORMAL
OPIATE SCREEN URINE: ABNORMAL
OXYCODONE SCREEN URINE: ABNORMAL
PHENCYCLIDINE SCREEN URINE: ABNORMAL
PROPOXYPHENE SCREEN, URINE: ABNORMAL
THC: ABNORMAL
TRICYCLIC ANTIDEPRESSANTS, UR: ABNORMAL

## 2018-02-07 PROCEDURE — 80305 DRUG TEST PRSMV DIR OPT OBS: CPT | Performed by: FAMILY MEDICINE

## 2018-02-07 PROCEDURE — 99214 OFFICE O/P EST MOD 30 MIN: CPT | Performed by: FAMILY MEDICINE

## 2018-02-07 PROCEDURE — 20605 DRAIN/INJ JOINT/BURSA W/O US: CPT | Performed by: FAMILY MEDICINE

## 2018-02-07 RX ORDER — ZOLPIDEM TARTRATE 5 MG/1
5 TABLET ORAL NIGHTLY PRN
Qty: 30 TABLET | Refills: 0 | Status: CANCELLED | OUTPATIENT
Start: 2018-02-07

## 2018-02-07 RX ORDER — ZOLPIDEM TARTRATE 10 MG/1
10 TABLET ORAL NIGHTLY PRN
Qty: 30 TABLET | Refills: 0 | Status: SHIPPED | OUTPATIENT
Start: 2018-02-07 | End: 2018-03-09 | Stop reason: ALTCHOICE

## 2018-02-07 RX ORDER — TERAZOSIN 1 MG/1
1 CAPSULE ORAL 2 TIMES DAILY
COMMUNITY
Start: 2018-01-25 | End: 2018-03-09 | Stop reason: ALTCHOICE

## 2018-02-07 ASSESSMENT — ENCOUNTER SYMPTOMS
EYES NEGATIVE: 1
GASTROINTESTINAL NEGATIVE: 1
ALLERGIC/IMMUNOLOGIC NEGATIVE: 1
RESPIRATORY NEGATIVE: 1

## 2018-02-07 NOTE — PROGRESS NOTES
Subjective:      Patient ID: Leonardo Darden is a 79 y.o. male. Chief Complaint   Patient presents with    Shoulder Pain     Right shoulder pain - Devaughn Klinefelter a few weeks ago    Insomnia     Requesting a refill on Ambien      Patient here for follow-up of multiple medical problems  Insomnia  Patient have problems going and staying asleep. He takes Ambien as needed. Last time I prescribe her Ambien 5. He stated that Ambien 5 doesn't help as much as Ambien 10. Request for me to increase his Ambien to 10 mg. Is being a week see he took his last pill. Medication is effective. Denies medication side effects. Right shoulder pain  Patient have chronic right shoulder pain. He had decreased range of motion of his right shoulder. He had the same problem the left shoulder. His pain management doctor inject his left shoulder and he feels better. He asked me if I can inject his right shoulder. He had this problem for years. He have multiple injuries on his right shoulder  Hypertension  Patient have hypertension. He takes blood pressure medication. He also have chronic kidney disease. He sees nephrology. Last time he saw nephrology was in November. His blood pressure is well-controlled. Is compliant with medication. Denies medication side effects. Review of Systems   Constitutional: Negative. HENT: Negative. Eyes: Negative. Respiratory: Negative. Cardiovascular: Negative. Gastrointestinal: Negative. Endocrine: Negative. Genitourinary: Negative. Musculoskeletal:        Right Shoulder pain   Skin: Negative. Allergic/Immunologic: Negative. Neurological: Negative. Hematological: Negative. Psychiatric/Behavioral: Positive for sleep disturbance. Objective:   Physical Exam   Constitutional: He is oriented to person, place, and time. He appears well-developed and well-nourished. HENT:   Head: Normocephalic and atraumatic.    Right Ear: External ear normal.   Left Ear: External ear normal.

## 2018-02-27 ENCOUNTER — APPOINTMENT (OUTPATIENT)
Dept: CT IMAGING | Facility: HOSPITAL | Age: 68
End: 2018-02-27

## 2018-02-27 ENCOUNTER — APPOINTMENT (OUTPATIENT)
Dept: GENERAL RADIOLOGY | Facility: HOSPITAL | Age: 68
End: 2018-02-27

## 2018-02-27 ENCOUNTER — APPOINTMENT (OUTPATIENT)
Dept: MRI IMAGING | Facility: HOSPITAL | Age: 68
End: 2018-02-27

## 2018-02-27 ENCOUNTER — HOSPITAL ENCOUNTER (INPATIENT)
Facility: HOSPITAL | Age: 68
LOS: 3 days | Discharge: HOME HEALTH | End: 2018-03-02
Attending: NEUROLOGICAL SURGERY | Admitting: NEUROLOGICAL SURGERY

## 2018-02-27 ENCOUNTER — ANESTHESIA (OUTPATIENT)
Dept: PERIOP | Facility: HOSPITAL | Age: 68
End: 2018-02-27

## 2018-02-27 ENCOUNTER — PREP FOR SURGERY (OUTPATIENT)
Dept: OTHER | Facility: HOSPITAL | Age: 68
End: 2018-02-27

## 2018-02-27 ENCOUNTER — ANESTHESIA EVENT (OUTPATIENT)
Dept: PERIOP | Facility: HOSPITAL | Age: 68
End: 2018-02-27

## 2018-02-27 DIAGNOSIS — S12.110A ODONTOID FRACTURE WITH TYPE II MORPHOLOGY (HCC): ICD-10-CM

## 2018-02-27 DIAGNOSIS — Z74.09 IMPAIRED MOBILITY AND ADLS: ICD-10-CM

## 2018-02-27 DIAGNOSIS — Z74.09 IMPAIRED MOBILITY: ICD-10-CM

## 2018-02-27 DIAGNOSIS — Z78.9 IMPAIRED MOBILITY AND ADLS: ICD-10-CM

## 2018-02-27 DIAGNOSIS — S12.110A ODONTOID FRACTURE WITH TYPE II MORPHOLOGY (HCC): Primary | ICD-10-CM

## 2018-02-27 LAB
ABO GROUP BLD: NORMAL
ALBUMIN SERPL-MCNC: 3.5 G/DL (ref 3.5–5)
ALBUMIN/GLOB SERPL: 1.3 G/DL (ref 1.1–2.5)
ALP SERPL-CCNC: 134 U/L (ref 24–120)
ALT SERPL W P-5'-P-CCNC: 29 U/L (ref 0–54)
ANION GAP SERPL CALCULATED.3IONS-SCNC: 10 MMOL/L (ref 4–13)
APTT PPP: 32.7 SECONDS (ref 24.1–34.8)
AST SERPL-CCNC: 33 U/L (ref 7–45)
BASOPHILS # BLD AUTO: 0.03 10*3/MM3 (ref 0–0.2)
BASOPHILS NFR BLD AUTO: 0.5 % (ref 0–2)
BILIRUB SERPL-MCNC: 0.5 MG/DL (ref 0.1–1)
BLD GP AB SCN SERPL QL: NEGATIVE
BUN BLD-MCNC: 27 MG/DL (ref 5–21)
BUN/CREAT SERPL: 21.1 (ref 7–25)
CALCIUM SPEC-SCNC: 8.8 MG/DL (ref 8.4–10.4)
CHLORIDE SERPL-SCNC: 98 MMOL/L (ref 98–110)
CO2 SERPL-SCNC: 31 MMOL/L (ref 24–31)
CREAT BLD-MCNC: 1.28 MG/DL (ref 0.5–1.4)
DEPRECATED RDW RBC AUTO: 45.5 FL (ref 40–54)
EOSINOPHIL # BLD AUTO: 0.08 10*3/MM3 (ref 0–0.7)
EOSINOPHIL NFR BLD AUTO: 1.2 % (ref 0–4)
ERYTHROCYTE [DISTWIDTH] IN BLOOD BY AUTOMATED COUNT: 14.2 % (ref 12–15)
GFR SERPL CREATININE-BSD FRML MDRD: 56 ML/MIN/1.73
GLOBULIN UR ELPH-MCNC: 2.8 GM/DL
GLUCOSE BLD-MCNC: 148 MG/DL (ref 70–100)
HCT VFR BLD AUTO: 23.6 % (ref 40–52)
HCT VFR BLD AUTO: 31.5 % (ref 40–52)
HGB BLD-MCNC: 10.8 G/DL (ref 14–18)
HGB BLD-MCNC: 8.2 G/DL (ref 14–18)
IMM GRANULOCYTES # BLD: 0.02 10*3/MM3 (ref 0–0.03)
IMM GRANULOCYTES NFR BLD: 0.3 % (ref 0–5)
INR PPP: 1.07 (ref 0.91–1.09)
LYMPHOCYTES # BLD AUTO: 1.41 10*3/MM3 (ref 0.72–4.86)
LYMPHOCYTES NFR BLD AUTO: 21.6 % (ref 15–45)
MCH RBC QN AUTO: 30.7 PG (ref 28–32)
MCHC RBC AUTO-ENTMCNC: 34.3 G/DL (ref 33–36)
MCV RBC AUTO: 89.5 FL (ref 82–95)
MONOCYTES # BLD AUTO: 0.42 10*3/MM3 (ref 0.19–1.3)
MONOCYTES NFR BLD AUTO: 6.4 % (ref 4–12)
NEUTROPHILS # BLD AUTO: 4.56 10*3/MM3 (ref 1.87–8.4)
NEUTROPHILS NFR BLD AUTO: 70 % (ref 39–78)
NRBC BLD MANUAL-RTO: 0 /100 WBC (ref 0–0)
PLATELET # BLD AUTO: 114 10*3/MM3 (ref 130–400)
PMV BLD AUTO: 9.9 FL (ref 6–12)
POTASSIUM BLD-SCNC: 4.3 MMOL/L (ref 3.5–5.3)
PROT SERPL-MCNC: 6.3 G/DL (ref 6.3–8.7)
PROTHROMBIN TIME: 14.2 SECONDS (ref 11.9–14.6)
RBC # BLD AUTO: 3.52 10*6/MM3 (ref 4.8–5.9)
RH BLD: POSITIVE
SODIUM BLD-SCNC: 139 MMOL/L (ref 135–145)
WBC NRBC COR # BLD: 6.52 10*3/MM3 (ref 4.8–10.8)

## 2018-02-27 PROCEDURE — 86900 BLOOD TYPING SEROLOGIC ABO: CPT | Performed by: NEUROLOGICAL SURGERY

## 2018-02-27 PROCEDURE — 72125 CT NECK SPINE W/O DYE: CPT

## 2018-02-27 PROCEDURE — C1713 ANCHOR/SCREW BN/BN,TIS/BN: HCPCS | Performed by: NEUROLOGICAL SURGERY

## 2018-02-27 PROCEDURE — 85018 HEMOGLOBIN: CPT | Performed by: NEUROLOGICAL SURGERY

## 2018-02-27 PROCEDURE — 93005 ELECTROCARDIOGRAM TRACING: CPT | Performed by: NEUROLOGICAL SURGERY

## 2018-02-27 PROCEDURE — 76380 CAT SCAN FOLLOW-UP STUDY: CPT

## 2018-02-27 PROCEDURE — 85014 HEMATOCRIT: CPT | Performed by: NEUROLOGICAL SURGERY

## 2018-02-27 PROCEDURE — 25010000002 MORPHINE SULFATE (PF) 2 MG/ML SOLUTION: Performed by: NEUROLOGICAL SURGERY

## 2018-02-27 PROCEDURE — 86901 BLOOD TYPING SEROLOGIC RH(D): CPT | Performed by: NEUROLOGICAL SURGERY

## 2018-02-27 PROCEDURE — 25010000002 HYDROMORPHONE PER 4 MG: Performed by: ANESTHESIOLOGY

## 2018-02-27 PROCEDURE — 0RG2071 FUSION OF 2 OR MORE CERVICAL VERTEBRAL JOINTS WITH AUTOLOGOUS TISSUE SUBSTITUTE, POSTERIOR APPROACH, POSTERIOR COLUMN, OPEN APPROACH: ICD-10-PCS | Performed by: NEUROLOGICAL SURGERY

## 2018-02-27 PROCEDURE — 85610 PROTHROMBIN TIME: CPT | Performed by: NEUROLOGICAL SURGERY

## 2018-02-27 PROCEDURE — 72040 X-RAY EXAM NECK SPINE 2-3 VW: CPT

## 2018-02-27 PROCEDURE — 25010000002 SUCCINYLCHOLINE PER 20 MG: Performed by: NURSE ANESTHETIST, CERTIFIED REGISTERED

## 2018-02-27 PROCEDURE — 72141 MRI NECK SPINE W/O DYE: CPT

## 2018-02-27 PROCEDURE — 80053 COMPREHEN METABOLIC PANEL: CPT | Performed by: NEUROLOGICAL SURGERY

## 2018-02-27 PROCEDURE — 25010000002 PROPOFOL 10 MG/ML EMULSION: Performed by: NURSE ANESTHETIST, CERTIFIED REGISTERED

## 2018-02-27 PROCEDURE — 99223 1ST HOSP IP/OBS HIGH 75: CPT | Performed by: NEUROLOGICAL SURGERY

## 2018-02-27 PROCEDURE — 94799 UNLISTED PULMONARY SVC/PX: CPT

## 2018-02-27 PROCEDURE — 86850 RBC ANTIBODY SCREEN: CPT | Performed by: NEUROLOGICAL SURGERY

## 2018-02-27 PROCEDURE — 76000 FLUOROSCOPY <1 HR PHYS/QHP: CPT

## 2018-02-27 PROCEDURE — 25010000002 MIDAZOLAM PER 1 MG: Performed by: ANESTHESIOLOGY

## 2018-02-27 PROCEDURE — 85730 THROMBOPLASTIN TIME PARTIAL: CPT | Performed by: NEUROLOGICAL SURGERY

## 2018-02-27 PROCEDURE — 93010 ELECTROCARDIOGRAM REPORT: CPT | Performed by: INTERNAL MEDICINE

## 2018-02-27 PROCEDURE — 63001 REMOVE SPINE LAMINA 1/2 CRVL: CPT | Performed by: NEUROLOGICAL SURGERY

## 2018-02-27 PROCEDURE — 22842 INSERT SPINE FIXATION DEVICE: CPT | Performed by: NEUROLOGICAL SURGERY

## 2018-02-27 PROCEDURE — 85025 COMPLETE CBC W/AUTO DIFF WBC: CPT | Performed by: NEUROLOGICAL SURGERY

## 2018-02-27 PROCEDURE — 25010000002 PROPOFOL 1000 MG/ML EMULSION: Performed by: NURSE ANESTHETIST, CERTIFIED REGISTERED

## 2018-02-27 DEVICE — SCREW 6958712 3.5 X 12MM MAS
Type: IMPLANTABLE DEVICE | Site: SPINE CERVICAL | Status: FUNCTIONAL
Brand: VERTEX® RECONSTRUCTION SYSTEM

## 2018-02-27 DEVICE — PUTTY DBM PROGENIX PLS 5CC: Type: IMPLANTABLE DEVICE | Site: SPINE CERVICAL | Status: FUNCTIONAL

## 2018-02-27 DEVICE — BONE GRAFT KIT 7510200 INFUSE SMALL
Type: IMPLANTABLE DEVICE | Site: SPINE CERVICAL | Status: FUNCTIONAL
Brand: INFUSE® BONE GRAFT

## 2018-02-27 RX ORDER — LABETALOL HYDROCHLORIDE 5 MG/ML
5 INJECTION, SOLUTION INTRAVENOUS
Status: DISCONTINUED | OUTPATIENT
Start: 2018-02-27 | End: 2018-02-27 | Stop reason: HOSPADM

## 2018-02-27 RX ORDER — ONDANSETRON 2 MG/ML
4 INJECTION INTRAMUSCULAR; INTRAVENOUS EVERY 6 HOURS PRN
Status: DISCONTINUED | OUTPATIENT
Start: 2018-02-27 | End: 2018-03-02 | Stop reason: HOSPADM

## 2018-02-27 RX ORDER — NALOXONE HCL 0.4 MG/ML
0.04 VIAL (ML) INJECTION AS NEEDED
Status: DISCONTINUED | OUTPATIENT
Start: 2018-02-27 | End: 2018-02-27 | Stop reason: HOSPADM

## 2018-02-27 RX ORDER — GABAPENTIN 300 MG/1
300 CAPSULE ORAL 3 TIMES DAILY
Status: DISCONTINUED | OUTPATIENT
Start: 2018-02-27 | End: 2018-03-02 | Stop reason: HOSPADM

## 2018-02-27 RX ORDER — MORPHINE SULFATE 2 MG/ML
2 INJECTION, SOLUTION INTRAMUSCULAR; INTRAVENOUS AS NEEDED
Status: DISCONTINUED | OUTPATIENT
Start: 2018-02-27 | End: 2018-02-27 | Stop reason: HOSPADM

## 2018-02-27 RX ORDER — BISACODYL 10 MG
10 SUPPOSITORY, RECTAL RECTAL DAILY PRN
Status: DISCONTINUED | OUTPATIENT
Start: 2018-02-27 | End: 2018-03-02 | Stop reason: HOSPADM

## 2018-02-27 RX ORDER — NALOXONE HCL 0.4 MG/ML
0.4 VIAL (ML) INJECTION
Status: DISCONTINUED | OUTPATIENT
Start: 2018-02-27 | End: 2018-02-28

## 2018-02-27 RX ORDER — SODIUM CHLORIDE 0.9 % (FLUSH) 0.9 %
1-10 SYRINGE (ML) INJECTION AS NEEDED
Status: DISCONTINUED | OUTPATIENT
Start: 2018-02-27 | End: 2018-03-02 | Stop reason: HOSPADM

## 2018-02-27 RX ORDER — SUCCINYLCHOLINE CHLORIDE 20 MG/ML
INJECTION INTRAMUSCULAR; INTRAVENOUS AS NEEDED
Status: DISCONTINUED | OUTPATIENT
Start: 2018-02-27 | End: 2018-02-27 | Stop reason: SURG

## 2018-02-27 RX ORDER — ATORVASTATIN CALCIUM 40 MG/1
40 TABLET, FILM COATED ORAL DAILY
Status: DISCONTINUED | OUTPATIENT
Start: 2018-02-27 | End: 2018-03-02 | Stop reason: HOSPADM

## 2018-02-27 RX ORDER — SODIUM CHLORIDE, SODIUM LACTATE, POTASSIUM CHLORIDE, CALCIUM CHLORIDE 600; 310; 30; 20 MG/100ML; MG/100ML; MG/100ML; MG/100ML
20 INJECTION, SOLUTION INTRAVENOUS CONTINUOUS
Status: DISCONTINUED | OUTPATIENT
Start: 2018-02-27 | End: 2018-02-28

## 2018-02-27 RX ORDER — MORPHINE SULFATE 2 MG/ML
2 INJECTION, SOLUTION INTRAMUSCULAR; INTRAVENOUS EVERY 4 HOURS PRN
Status: DISCONTINUED | OUTPATIENT
Start: 2018-02-27 | End: 2018-03-02 | Stop reason: HOSPADM

## 2018-02-27 RX ORDER — MIDAZOLAM HYDROCHLORIDE 1 MG/ML
2 INJECTION INTRAMUSCULAR; INTRAVENOUS
Status: DISCONTINUED | OUTPATIENT
Start: 2018-02-27 | End: 2018-02-27 | Stop reason: HOSPADM

## 2018-02-27 RX ORDER — FLUMAZENIL 0.1 MG/ML
0.2 INJECTION INTRAVENOUS AS NEEDED
Status: DISCONTINUED | OUTPATIENT
Start: 2018-02-27 | End: 2018-02-27 | Stop reason: HOSPADM

## 2018-02-27 RX ORDER — METOCLOPRAMIDE HYDROCHLORIDE 5 MG/ML
5 INJECTION INTRAMUSCULAR; INTRAVENOUS
Status: DISCONTINUED | OUTPATIENT
Start: 2018-02-27 | End: 2018-02-27 | Stop reason: HOSPADM

## 2018-02-27 RX ORDER — LOSARTAN POTASSIUM 50 MG/1
100 TABLET ORAL DAILY
Status: DISCONTINUED | OUTPATIENT
Start: 2018-02-27 | End: 2018-03-02 | Stop reason: HOSPADM

## 2018-02-27 RX ORDER — ONDANSETRON 2 MG/ML
4 INJECTION INTRAMUSCULAR; INTRAVENOUS AS NEEDED
Status: DISCONTINUED | OUTPATIENT
Start: 2018-02-27 | End: 2018-02-27 | Stop reason: HOSPADM

## 2018-02-27 RX ORDER — OXYCODONE HYDROCHLORIDE 5 MG/1
15 TABLET ORAL EVERY 8 HOURS PRN
Status: DISCONTINUED | OUTPATIENT
Start: 2018-02-27 | End: 2018-02-27

## 2018-02-27 RX ORDER — HYDRALAZINE HYDROCHLORIDE 20 MG/ML
5 INJECTION INTRAMUSCULAR; INTRAVENOUS
Status: DISCONTINUED | OUTPATIENT
Start: 2018-02-27 | End: 2018-02-27 | Stop reason: HOSPADM

## 2018-02-27 RX ORDER — MAGNESIUM HYDROXIDE 1200 MG/15ML
LIQUID ORAL AS NEEDED
Status: DISCONTINUED | OUTPATIENT
Start: 2018-02-27 | End: 2018-02-27 | Stop reason: HOSPADM

## 2018-02-27 RX ORDER — SUFENTANIL CITRATE 50 UG/ML
INJECTION EPIDURAL; INTRAVENOUS AS NEEDED
Status: DISCONTINUED | OUTPATIENT
Start: 2018-02-27 | End: 2018-02-27 | Stop reason: SURG

## 2018-02-27 RX ORDER — POTASSIUM CHLORIDE 750 MG/1
10 CAPSULE, EXTENDED RELEASE ORAL DAILY
Status: DISCONTINUED | OUTPATIENT
Start: 2018-02-27 | End: 2018-03-02 | Stop reason: HOSPADM

## 2018-02-27 RX ORDER — PHENYLEPHRINE HCL IN 0.9% NACL 0.8MG/10ML
SYRINGE (ML) INTRAVENOUS AS NEEDED
Status: DISCONTINUED | OUTPATIENT
Start: 2018-02-27 | End: 2018-02-27 | Stop reason: SURG

## 2018-02-27 RX ORDER — SENNA AND DOCUSATE SODIUM 50; 8.6 MG/1; MG/1
2 TABLET, FILM COATED ORAL 2 TIMES DAILY PRN
Status: DISCONTINUED | OUTPATIENT
Start: 2018-02-27 | End: 2018-03-02 | Stop reason: HOSPADM

## 2018-02-27 RX ORDER — SODIUM CHLORIDE 9 MG/ML
75 INJECTION, SOLUTION INTRAVENOUS CONTINUOUS
Status: DISCONTINUED | OUTPATIENT
Start: 2018-02-27 | End: 2018-03-02 | Stop reason: HOSPADM

## 2018-02-27 RX ORDER — SODIUM CHLORIDE 0.9 % (FLUSH) 0.9 %
1-10 SYRINGE (ML) INJECTION AS NEEDED
Status: DISCONTINUED | OUTPATIENT
Start: 2018-02-27 | End: 2018-02-27 | Stop reason: HOSPADM

## 2018-02-27 RX ORDER — IPRATROPIUM BROMIDE AND ALBUTEROL SULFATE 2.5; .5 MG/3ML; MG/3ML
3 SOLUTION RESPIRATORY (INHALATION) ONCE AS NEEDED
Status: DISCONTINUED | OUTPATIENT
Start: 2018-02-27 | End: 2018-02-27 | Stop reason: HOSPADM

## 2018-02-27 RX ORDER — MEPERIDINE HYDROCHLORIDE 25 MG/ML
12.5 INJECTION INTRAMUSCULAR; INTRAVENOUS; SUBCUTANEOUS
Status: DISCONTINUED | OUTPATIENT
Start: 2018-02-27 | End: 2018-02-27 | Stop reason: HOSPADM

## 2018-02-27 RX ORDER — OXYCODONE HYDROCHLORIDE 5 MG/1
20 TABLET ORAL EVERY 8 HOURS PRN
Status: DISCONTINUED | OUTPATIENT
Start: 2018-02-27 | End: 2018-03-02 | Stop reason: HOSPADM

## 2018-02-27 RX ORDER — ALPRAZOLAM 0.5 MG/1
1 TABLET ORAL 3 TIMES DAILY PRN
Status: DISCONTINUED | OUTPATIENT
Start: 2018-02-27 | End: 2018-03-02 | Stop reason: HOSPADM

## 2018-02-27 RX ORDER — SODIUM CHLORIDE, SODIUM LACTATE, POTASSIUM CHLORIDE, CALCIUM CHLORIDE 600; 310; 30; 20 MG/100ML; MG/100ML; MG/100ML; MG/100ML
100 INJECTION, SOLUTION INTRAVENOUS CONTINUOUS
Status: DISCONTINUED | OUTPATIENT
Start: 2018-02-27 | End: 2018-02-28

## 2018-02-27 RX ORDER — GLYCOPYRROLATE 0.2 MG/ML
INJECTION INTRAMUSCULAR; INTRAVENOUS AS NEEDED
Status: DISCONTINUED | OUTPATIENT
Start: 2018-02-27 | End: 2018-02-27 | Stop reason: SURG

## 2018-02-27 RX ORDER — SODIUM CHLORIDE 9 MG/ML
INJECTION, SOLUTION INTRAVENOUS AS NEEDED
Status: DISCONTINUED | OUTPATIENT
Start: 2018-02-27 | End: 2018-02-27 | Stop reason: HOSPADM

## 2018-02-27 RX ORDER — VASOPRESSIN 20 U/ML
INJECTION PARENTERAL AS NEEDED
Status: DISCONTINUED | OUTPATIENT
Start: 2018-02-27 | End: 2018-02-27 | Stop reason: SURG

## 2018-02-27 RX ORDER — CHLORTHALIDONE 25 MG/1
25 TABLET ORAL DAILY
Status: DISCONTINUED | OUTPATIENT
Start: 2018-02-27 | End: 2018-03-02 | Stop reason: HOSPADM

## 2018-02-27 RX ORDER — BACITRACIN ZINC 500 [USP'U]/G
OINTMENT TOPICAL AS NEEDED
Status: DISCONTINUED | OUTPATIENT
Start: 2018-02-27 | End: 2018-02-27 | Stop reason: HOSPADM

## 2018-02-27 RX ORDER — ACETAMINOPHEN 650 MG/1
650 SUPPOSITORY RECTAL EVERY 4 HOURS PRN
Status: DISCONTINUED | OUTPATIENT
Start: 2018-02-27 | End: 2018-03-02 | Stop reason: HOSPADM

## 2018-02-27 RX ORDER — CELECOXIB 200 MG/1
200 CAPSULE ORAL DAILY
Status: DISCONTINUED | OUTPATIENT
Start: 2018-02-27 | End: 2018-02-28

## 2018-02-27 RX ORDER — OXYCODONE AND ACETAMINOPHEN 7.5; 325 MG/1; MG/1
2 TABLET ORAL ONCE AS NEEDED
Status: COMPLETED | OUTPATIENT
Start: 2018-02-27 | End: 2018-02-27

## 2018-02-27 RX ORDER — MIDAZOLAM HYDROCHLORIDE 1 MG/ML
1 INJECTION INTRAMUSCULAR; INTRAVENOUS
Status: DISCONTINUED | OUTPATIENT
Start: 2018-02-27 | End: 2018-02-27 | Stop reason: HOSPADM

## 2018-02-27 RX ORDER — ROCURONIUM BROMIDE 10 MG/ML
INJECTION, SOLUTION INTRAVENOUS AS NEEDED
Status: DISCONTINUED | OUTPATIENT
Start: 2018-02-27 | End: 2018-02-27 | Stop reason: SURG

## 2018-02-27 RX ORDER — OXYCODONE AND ACETAMINOPHEN 7.5; 325 MG/1; MG/1
1 TABLET ORAL EVERY 4 HOURS PRN
Status: DISCONTINUED | OUTPATIENT
Start: 2018-02-27 | End: 2018-02-28

## 2018-02-27 RX ORDER — PROPOFOL 10 MG/ML
VIAL (ML) INTRAVENOUS AS NEEDED
Status: DISCONTINUED | OUTPATIENT
Start: 2018-02-27 | End: 2018-02-27 | Stop reason: SURG

## 2018-02-27 RX ORDER — LIDOCAINE HYDROCHLORIDE 20 MG/ML
INJECTION, SOLUTION INFILTRATION; PERINEURAL AS NEEDED
Status: DISCONTINUED | OUTPATIENT
Start: 2018-02-27 | End: 2018-02-27 | Stop reason: SURG

## 2018-02-27 RX ADMIN — VASOPRESSIN 0.5 UNITS: 20 INJECTION INTRAVENOUS at 19:40

## 2018-02-27 RX ADMIN — SODIUM CHLORIDE, POTASSIUM CHLORIDE, SODIUM LACTATE AND CALCIUM CHLORIDE: 600; 310; 30; 20 INJECTION, SOLUTION INTRAVENOUS at 16:53

## 2018-02-27 RX ADMIN — SODIUM CHLORIDE, POTASSIUM CHLORIDE, SODIUM LACTATE AND CALCIUM CHLORIDE 20 ML/HR: 600; 310; 30; 20 INJECTION, SOLUTION INTRAVENOUS at 22:28

## 2018-02-27 RX ADMIN — OXYCODONE HYDROCHLORIDE AND ACETAMINOPHEN 2 TABLET: 7.5; 325 TABLET ORAL at 06:50

## 2018-02-27 RX ADMIN — CHLORTHALIDONE 25 MG: 25 TABLET ORAL at 08:55

## 2018-02-27 RX ADMIN — HYDROMORPHONE HYDROCHLORIDE 0.5 MG: 1 INJECTION, SOLUTION INTRAMUSCULAR; INTRAVENOUS; SUBCUTANEOUS at 15:28

## 2018-02-27 RX ADMIN — Medication 40 MCG: at 21:06

## 2018-02-27 RX ADMIN — GABAPENTIN 300 MG: 300 CAPSULE ORAL at 08:55

## 2018-02-27 RX ADMIN — PROPOFOL 75 MCG/KG/MIN: 10 INJECTION, EMULSION INTRAVENOUS at 17:10

## 2018-02-27 RX ADMIN — SODIUM CHLORIDE 75 ML/HR: 9 INJECTION, SOLUTION INTRAVENOUS at 07:30

## 2018-02-27 RX ADMIN — EPHEDRINE SULFATE 10 MG: 50 INJECTION INTRAMUSCULAR; INTRAVENOUS; SUBCUTANEOUS at 17:07

## 2018-02-27 RX ADMIN — MIDAZOLAM HYDROCHLORIDE 1 MG: 1 INJECTION, SOLUTION INTRAMUSCULAR; INTRAVENOUS at 15:49

## 2018-02-27 RX ADMIN — SUFENTANIL CITRATE 50 MCG: 50 INJECTION, SOLUTION EPIDURAL; INTRAVENOUS at 17:55

## 2018-02-27 RX ADMIN — LIDOCAINE HYDROCHLORIDE 100 MG: 20 INJECTION, SOLUTION INFILTRATION; PERINEURAL at 17:04

## 2018-02-27 RX ADMIN — LOSARTAN POTASSIUM 100 MG: 50 TABLET ORAL at 08:55

## 2018-02-27 RX ADMIN — SODIUM CHLORIDE, POTASSIUM CHLORIDE, SODIUM LACTATE AND CALCIUM CHLORIDE 100 ML/HR: 600; 310; 30; 20 INJECTION, SOLUTION INTRAVENOUS at 15:28

## 2018-02-27 RX ADMIN — HYDROMORPHONE HYDROCHLORIDE 0.5 MG: 1 INJECTION, SOLUTION INTRAMUSCULAR; INTRAVENOUS; SUBCUTANEOUS at 22:42

## 2018-02-27 RX ADMIN — Medication 80 MCG: at 20:21

## 2018-02-27 RX ADMIN — Medication 2 G: at 16:53

## 2018-02-27 RX ADMIN — OXYCODONE HYDROCHLORIDE AND ACETAMINOPHEN 1 TABLET: 7.5; 325 TABLET ORAL at 02:40

## 2018-02-27 RX ADMIN — Medication 40 MCG: at 20:34

## 2018-02-27 RX ADMIN — SUFENTANIL CITRATE 20 MCG: 50 INJECTION, SOLUTION EPIDURAL; INTRAVENOUS at 20:13

## 2018-02-27 RX ADMIN — MORPHINE SULFATE 2 MG: 2 INJECTION, SOLUTION INTRAMUSCULAR; INTRAVENOUS at 05:15

## 2018-02-27 RX ADMIN — GLYCOPYRROLATE 0.2 MG: 0.2 INJECTION, SOLUTION INTRAMUSCULAR; INTRAVENOUS at 17:10

## 2018-02-27 RX ADMIN — PROPOFOL 200 MG: 10 INJECTION, EMULSION INTRAVENOUS at 17:04

## 2018-02-27 RX ADMIN — ROCURONIUM BROMIDE 5 MG: 10 INJECTION INTRAVENOUS at 17:04

## 2018-02-27 RX ADMIN — SUFENTANIL CITRATE 25 MCG: 50 INJECTION, SOLUTION EPIDURAL; INTRAVENOUS at 17:04

## 2018-02-27 RX ADMIN — SUCCINYLCHOLINE CHLORIDE 100 MG: 20 INJECTION, SOLUTION INTRAMUSCULAR; INTRAVENOUS at 17:04

## 2018-02-27 RX ADMIN — MORPHINE SULFATE 2 MG: 2 INJECTION, SOLUTION INTRAMUSCULAR; INTRAVENOUS at 00:57

## 2018-02-27 RX ADMIN — VASOPRESSIN 0.5 UNITS: 20 INJECTION INTRAVENOUS at 20:00

## 2018-02-27 RX ADMIN — MORPHINE SULFATE 2 MG: 2 INJECTION, SOLUTION INTRAMUSCULAR; INTRAVENOUS at 08:54

## 2018-02-27 RX ADMIN — DESMOPRESSIN ACETATE 40 MG: 0.2 TABLET ORAL at 08:55

## 2018-02-27 RX ADMIN — OXYCODONE HYDROCHLORIDE AND ACETAMINOPHEN 1 TABLET: 7.5; 325 TABLET ORAL at 10:52

## 2018-02-27 RX ADMIN — POTASSIUM CHLORIDE 10 MEQ: 750 CAPSULE, EXTENDED RELEASE ORAL at 08:55

## 2018-02-27 RX ADMIN — HYDROMORPHONE HYDROCHLORIDE 0.5 MG: 1 INJECTION, SOLUTION INTRAMUSCULAR; INTRAVENOUS; SUBCUTANEOUS at 22:37

## 2018-02-27 RX ADMIN — SUFENTANIL CITRATE 25 MCG: 50 INJECTION, SOLUTION EPIDURAL; INTRAVENOUS at 17:10

## 2018-02-27 RX ADMIN — MORPHINE SULFATE 2 MG: 2 INJECTION, SOLUTION INTRAMUSCULAR; INTRAVENOUS at 12:56

## 2018-02-27 RX ADMIN — MIDAZOLAM HYDROCHLORIDE 1 MG: 1 INJECTION, SOLUTION INTRAMUSCULAR; INTRAVENOUS at 15:28

## 2018-02-27 RX ADMIN — SODIUM CHLORIDE, POTASSIUM CHLORIDE, SODIUM LACTATE AND CALCIUM CHLORIDE 20 ML/HR: 600; 310; 30; 20 INJECTION, SOLUTION INTRAVENOUS at 16:18

## 2018-02-27 NOTE — ANESTHESIA PREPROCEDURE EVALUATION
Anesthesia Evaluation     Patient summary reviewed                Airway   Mallampati: III  TM distance: >3 FB  Neck ROM: limited  difficult intubation highly probable  Comment: c collar in place  Dental    (+) edentulous    Pulmonary - normal exam   (-) COPD, asthma, sleep apnea, not a smoker  Cardiovascular - normal exam  Exercise tolerance: (Limited mobility, uses walker)    ECG reviewed    (+) hypertension,       Neuro/Psych  (-) seizures, TIA, CVA  GI/Hepatic/Renal/Endo    (-) liver disease, no renal disease, diabetes    Musculoskeletal     (+) back pain, chronic pain, neck pain, neck stiffness,   Abdominal    Substance History      OB/GYN          Other                      Anesthesia Plan    ASA 3     general   (Will need inline stabilization and no neck manipulation as potential for unstable high C spine fracture and retropulsion)  intravenous induction   Anesthetic plan and risks discussed with patient.         Per Dr. Donis's H&P:  CT scan of the brain is unremarkable for any acute change.  CT scan of the cervical spine shows a type II odontoid fracture with retropulsion and likely instability.  There is also a fracture of the posterior ring of C1 Huey-type.  He has at least a 4 level anterior cervical fusion from multiple prior corpectomies from C3 to C7.  The patient has a fractured C2 and C1 in a manner that slightly unstable.  He will require surgical stabilization.  Given the fact that he has had multiple prior anterior cervical surgeries and corpectomies I think the best way to treat his fracture and instability would be through a posterior cervical fusion from C1 down to C4.  This will stabilize the C1 2 fracture and will fuse the last mobile segment of his neck at C2 3 so that he does not develop problems there in the future

## 2018-02-28 LAB — GLUCOSE BLDC GLUCOMTR-MCNC: 141 MG/DL (ref 70–130)

## 2018-02-28 PROCEDURE — 97165 OT EVAL LOW COMPLEX 30 MIN: CPT

## 2018-02-28 PROCEDURE — G8978 MOBILITY CURRENT STATUS: HCPCS

## 2018-02-28 PROCEDURE — 97116 GAIT TRAINING THERAPY: CPT

## 2018-02-28 PROCEDURE — G8987 SELF CARE CURRENT STATUS: HCPCS

## 2018-02-28 PROCEDURE — 82962 GLUCOSE BLOOD TEST: CPT

## 2018-02-28 PROCEDURE — G8979 MOBILITY GOAL STATUS: HCPCS

## 2018-02-28 PROCEDURE — P9612 CATHETERIZE FOR URINE SPEC: HCPCS

## 2018-02-28 PROCEDURE — G8988 SELF CARE GOAL STATUS: HCPCS

## 2018-02-28 PROCEDURE — 99024 POSTOP FOLLOW-UP VISIT: CPT | Performed by: NURSE PRACTITIONER

## 2018-02-28 PROCEDURE — 97162 PT EVAL MOD COMPLEX 30 MIN: CPT

## 2018-02-28 PROCEDURE — 25010000002 MORPHINE SULFATE (PF) 2 MG/ML SOLUTION: Performed by: NEUROLOGICAL SURGERY

## 2018-02-28 RX ORDER — CARISOPRODOL 350 MG/1
350 TABLET ORAL EVERY 8 HOURS PRN
Status: DISCONTINUED | OUTPATIENT
Start: 2018-02-28 | End: 2018-03-02 | Stop reason: HOSPADM

## 2018-02-28 RX ADMIN — GABAPENTIN 300 MG: 300 CAPSULE ORAL at 00:23

## 2018-02-28 RX ADMIN — SODIUM CHLORIDE 500 ML: 9 INJECTION, SOLUTION INTRAVENOUS at 13:17

## 2018-02-28 RX ADMIN — GABAPENTIN 300 MG: 300 CAPSULE ORAL at 10:08

## 2018-02-28 RX ADMIN — MORPHINE SULFATE 2 MG: 2 INJECTION, SOLUTION INTRAMUSCULAR; INTRAVENOUS at 00:58

## 2018-02-28 RX ADMIN — GABAPENTIN 300 MG: 300 CAPSULE ORAL at 21:07

## 2018-02-28 RX ADMIN — OXYCODONE HYDROCHLORIDE 20 MG: 5 TABLET ORAL at 10:20

## 2018-02-28 RX ADMIN — CHLORTHALIDONE 25 MG: 25 TABLET ORAL at 10:08

## 2018-02-28 RX ADMIN — OXYCODONE HYDROCHLORIDE 20 MG: 5 TABLET ORAL at 04:19

## 2018-02-28 RX ADMIN — ALPRAZOLAM 1 MG: 0.5 TABLET ORAL at 21:54

## 2018-02-28 RX ADMIN — DESMOPRESSIN ACETATE 40 MG: 0.2 TABLET ORAL at 10:08

## 2018-02-28 RX ADMIN — POTASSIUM CHLORIDE 10 MEQ: 750 CAPSULE, EXTENDED RELEASE ORAL at 10:08

## 2018-02-28 RX ADMIN — MORPHINE SULFATE 2 MG: 2 INJECTION, SOLUTION INTRAMUSCULAR; INTRAVENOUS at 17:15

## 2018-02-28 RX ADMIN — MORPHINE SULFATE 2 MG: 2 INJECTION, SOLUTION INTRAMUSCULAR; INTRAVENOUS at 06:07

## 2018-02-28 RX ADMIN — SODIUM CHLORIDE 75 ML/HR: 9 INJECTION, SOLUTION INTRAVENOUS at 01:10

## 2018-02-28 RX ADMIN — GABAPENTIN 300 MG: 300 CAPSULE ORAL at 16:08

## 2018-02-28 RX ADMIN — OXYCODONE HYDROCHLORIDE 20 MG: 5 TABLET ORAL at 21:07

## 2018-02-28 RX ADMIN — SODIUM CHLORIDE 75 ML/HR: 9 INJECTION, SOLUTION INTRAVENOUS at 15:09

## 2018-02-28 RX ADMIN — ALPRAZOLAM 1 MG: 0.5 TABLET ORAL at 01:00

## 2018-02-28 RX ADMIN — LOSARTAN POTASSIUM 100 MG: 50 TABLET ORAL at 11:28

## 2018-02-28 RX ADMIN — MORPHINE SULFATE 2 MG: 2 INJECTION, SOLUTION INTRAMUSCULAR; INTRAVENOUS at 12:55

## 2018-02-28 RX ADMIN — MORPHINE SULFATE 2 MG: 2 INJECTION, SOLUTION INTRAMUSCULAR; INTRAVENOUS at 22:33

## 2018-02-28 NOTE — ANESTHESIA POSTPROCEDURE EVALUATION
Patient: Vikas Cruz    Procedure Summary     Date Anesthesia Start Anesthesia Stop Room / Location    02/27/18 1653 [id number]  [facility] OR 07 / [facility]       Procedure Diagnosis Surgeon Provider    CERVICAL  POSTERIOR INSTRUMENTED FUSION C1-4 (N/A Spine Cervical) Odontoid fracture with type II morphology  (Odontoid fracture with type II morphology [S12.110A]) MD Maxi Tillman CRNA          Anesthesia Type: general  Last vitals  BP   103/46 (02/27/18 2315)   Temp   97.6 °F (36.4 °C) (02/27/18 2300)   Pulse   72 (02/27/18 2315)   Resp   12 (02/27/18 2315)     SpO2   98 % (02/27/18 2315)     Post Anesthesia Care and Evaluation    Patient location during evaluation: PACU  Patient participation: complete - patient participated  Level of consciousness: awake and alert  Pain score: 0  Pain management: adequate  Airway patency: patent  Anesthetic complications: No anesthetic complications  PONV Status: none  Cardiovascular status: acceptable and stable  Respiratory status: acceptable  Hydration status: acceptable

## 2018-03-01 LAB
BASOPHILS # BLD AUTO: 0.01 10*3/MM3 (ref 0–0.2)
BASOPHILS NFR BLD AUTO: 0.2 % (ref 0–2)
DEPRECATED RDW RBC AUTO: 48.7 FL (ref 40–54)
EOSINOPHIL # BLD AUTO: 0.07 10*3/MM3 (ref 0–0.7)
EOSINOPHIL NFR BLD AUTO: 1.2 % (ref 0–4)
ERYTHROCYTE [DISTWIDTH] IN BLOOD BY AUTOMATED COUNT: 14.6 % (ref 12–15)
HCT VFR BLD AUTO: 25.2 % (ref 40–52)
HGB BLD-MCNC: 8.6 G/DL (ref 14–18)
IMM GRANULOCYTES # BLD: 0.02 10*3/MM3 (ref 0–0.03)
IMM GRANULOCYTES NFR BLD: 0.4 % (ref 0–5)
LYMPHOCYTES # BLD AUTO: 1.49 10*3/MM3 (ref 0.72–4.86)
LYMPHOCYTES NFR BLD AUTO: 26.5 % (ref 15–45)
MCH RBC QN AUTO: 30.8 PG (ref 28–32)
MCHC RBC AUTO-ENTMCNC: 34.1 G/DL (ref 33–36)
MCV RBC AUTO: 90.3 FL (ref 82–95)
MONOCYTES # BLD AUTO: 0.42 10*3/MM3 (ref 0.19–1.3)
MONOCYTES NFR BLD AUTO: 7.5 % (ref 4–12)
NEUTROPHILS # BLD AUTO: 3.62 10*3/MM3 (ref 1.87–8.4)
NEUTROPHILS NFR BLD AUTO: 64.2 % (ref 39–78)
NRBC BLD MANUAL-RTO: 0 /100 WBC (ref 0–0)
PLATELET # BLD AUTO: 104 10*3/MM3 (ref 130–400)
PMV BLD AUTO: 9.9 FL (ref 6–12)
RBC # BLD AUTO: 2.79 10*6/MM3 (ref 4.8–5.9)
WBC NRBC COR # BLD: 5.63 10*3/MM3 (ref 4.8–10.8)

## 2018-03-01 PROCEDURE — 85025 COMPLETE CBC W/AUTO DIFF WBC: CPT | Performed by: NURSE PRACTITIONER

## 2018-03-01 PROCEDURE — 97535 SELF CARE MNGMENT TRAINING: CPT

## 2018-03-01 PROCEDURE — 97116 GAIT TRAINING THERAPY: CPT

## 2018-03-01 PROCEDURE — 99024 POSTOP FOLLOW-UP VISIT: CPT | Performed by: NURSE PRACTITIONER

## 2018-03-01 PROCEDURE — 25010000002 MORPHINE SULFATE (PF) 2 MG/ML SOLUTION: Performed by: NEUROLOGICAL SURGERY

## 2018-03-01 RX ORDER — TAMSULOSIN HYDROCHLORIDE 0.4 MG/1
0.4 CAPSULE ORAL DAILY
Status: DISCONTINUED | OUTPATIENT
Start: 2018-03-01 | End: 2018-03-02 | Stop reason: HOSPADM

## 2018-03-01 RX ADMIN — OXYCODONE HYDROCHLORIDE 20 MG: 5 TABLET ORAL at 04:56

## 2018-03-01 RX ADMIN — MORPHINE SULFATE 2 MG: 2 INJECTION, SOLUTION INTRAMUSCULAR; INTRAVENOUS at 03:22

## 2018-03-01 RX ADMIN — DESMOPRESSIN ACETATE 40 MG: 0.2 TABLET ORAL at 08:46

## 2018-03-01 RX ADMIN — TAMSULOSIN HYDROCHLORIDE 0.4 MG: 0.4 CAPSULE ORAL at 10:52

## 2018-03-01 RX ADMIN — SODIUM CHLORIDE 75 ML/HR: 9 INJECTION, SOLUTION INTRAVENOUS at 03:26

## 2018-03-01 RX ADMIN — ALPRAZOLAM 1 MG: 0.5 TABLET ORAL at 21:58

## 2018-03-01 RX ADMIN — SODIUM CHLORIDE 75 ML/HR: 9 INJECTION, SOLUTION INTRAVENOUS at 17:35

## 2018-03-01 RX ADMIN — GABAPENTIN 300 MG: 300 CAPSULE ORAL at 15:24

## 2018-03-01 RX ADMIN — OXYCODONE HYDROCHLORIDE 20 MG: 5 TABLET ORAL at 20:51

## 2018-03-01 RX ADMIN — CHLORTHALIDONE 25 MG: 25 TABLET ORAL at 08:46

## 2018-03-01 RX ADMIN — OXYCODONE HYDROCHLORIDE 20 MG: 5 TABLET ORAL at 10:24

## 2018-03-01 RX ADMIN — GABAPENTIN 300 MG: 300 CAPSULE ORAL at 08:46

## 2018-03-01 RX ADMIN — CARISOPRODOL 350 MG: 350 TABLET ORAL at 15:24

## 2018-03-01 RX ADMIN — MORPHINE SULFATE 2 MG: 2 INJECTION, SOLUTION INTRAMUSCULAR; INTRAVENOUS at 22:26

## 2018-03-01 RX ADMIN — CARISOPRODOL 350 MG: 350 TABLET ORAL at 04:56

## 2018-03-01 RX ADMIN — MORPHINE SULFATE 2 MG: 2 INJECTION, SOLUTION INTRAMUSCULAR; INTRAVENOUS at 08:46

## 2018-03-01 RX ADMIN — GABAPENTIN 300 MG: 300 CAPSULE ORAL at 20:51

## 2018-03-01 RX ADMIN — MORPHINE SULFATE 2 MG: 2 INJECTION, SOLUTION INTRAMUSCULAR; INTRAVENOUS at 14:59

## 2018-03-01 RX ADMIN — POTASSIUM CHLORIDE 10 MEQ: 750 CAPSULE, EXTENDED RELEASE ORAL at 08:46

## 2018-03-01 RX ADMIN — LOSARTAN POTASSIUM 100 MG: 50 TABLET ORAL at 08:46

## 2018-03-01 NOTE — PLAN OF CARE
Problem: Patient Care Overview (Adult)  Goal: Plan of Care Review  Outcome: Ongoing (interventions implemented as appropriate)   03/01/18 1116   Coping/Psychosocial Response Interventions   Plan Of Care Reviewed With patient   Patient Care Overview   Progress progress toward functional goals as expected   Outcome Evaluation   Outcome Summary/Follow up Plan PT tx completed. Pt supine in bed. rates neck pn 6/10. Min assist supine to sit, independent sit to supine. Transfers S, amb 300' x 2 with r wx. Performed 4 steps S. Recommend home health at time of dishcarge.

## 2018-03-01 NOTE — THERAPY TREATMENT NOTE
"Acute Care - Physical Therapy Treatment Note  [facility]     Patient Name: Vikas Cruz  : 1950  MRN: [id number]  Today's Date: 3/1/2018  Onset of Illness/Injury or Date of Surgery Date: 18  Date of Referral to PT: 18  Referring Physician: Dr. Donis    Admit Date: 2018    Visit Dx:    ICD-10-CM ICD-9-CM   1. Odontoid fracture with type II morphology S12.110A 805.02   2. Impaired mobility Z74. 799.89   3. Impaired mobility and ADLs Z74. 799.89     Patient Active Problem List   Diagnosis   • Odontoid fracture with type II morphology               Adult Rehabilitation Note       18 1116 18 1024 18 1001    Rehab Assessment/Intervention    Discipline physical therapy assistant  -KJ occupational therapy assistant  -TS --  -KJ    Document Type therapy note (daily note)  -KJ --  -TS --  -KJ    Subjective Information agree to therapy;complains of;dizziness  -KJ      Treatment Not Performed, Comment  pt requested to come back later due to \"so much pain from not being able to pee\" Pt RN placing catheter this AM  -TS     Precautions/Limitations fall precautions;brace on when up;spinal precautions  -KJ      Recorded by [KJ] Cristela Chan PTA [TS] BARRY Goodman/GUERLINE [KJ] Cristela Chan PTA    Pain Assessment    Pain Assessment 0-10  -KJ      Pain Score 6  -KJ      Post Pain Score 7  -KJ      Pain Type Acute pain;Surgical pain  -KJ      Pain Location Neck  -KJ      Pain Orientation Posterior  -KJ      Pain Frequency Constant/continuous  -KJ      Pain Intervention(s) Ambulation/increased activity  -KJ      Response to Interventions tolerated  -KJ      Recorded by [KJ] Cristela Chan PTA      Bed Mobility, Assessment/Treatment    Bed Mob, Supine to Sit, [place] verbal cues required;minimum assist (75% patient effort)  -KJ      Bed Mob, Sit to Supine, [place] independent  -KJ      Bed Mobility, Safety Issues decreased use of arms for pushing/pulling  -KJ      Bed " Mobility, Impairments strength decreased  -KJ      Bed Mobility, Comment vc's for log rolling  -KJ      Recorded by [KJ] Cristela Chan PTA      Transfer Assessment/Treatment    Transfers, Sit-Stand [place] conditional independence  -KJ      Transfers, Stand-Sit [place] conditional independence  -KJ      Transfers, Sit-Stand-Sit, Assist Device rolling walker  -KJ      Transfer, Safety Issues step length decreased  -KJ      Transfer, Impairments strength decreased  -KJ      Recorded by [KJ] Cristela Chan PTA      Gait Assessment/Treatment    Gait, [place] Level verbal cues required;stand by assist  -KJ      Gait, Assistive Device rolling walker  -KJ      Gait, Distance (Feet) 300   standing rest 300' back to room  -KJ      Gait, Gait Deviations bilateral:;forward flexed posture  -KJ      Gait, Safety Issues sequencing ability decreased  -KJ      Gait, Impairments strength decreased  -KJ      Recorded by [KJ] Cristela Chan PTA      Stairs Assessment/Treatment    Number of Stairs 4  -KJ      Stairs, Handrail Location left side (ascending)  -KJ      Stairs, [place] Level independent  -KJ      Stairs, Technique Used step over step (ascending);step over step (descending)  -KJ      Recorded by [KJ] Cristela Chan PTA      Orthotics Prosthetics    Additional Documentation Orthosis Location (Group)  -KJ      Recorded by [KJ] Cristela Chan PTA      Orthosis Location    Orthosis Location/Type neck/back  -KJ      Orthosis, Neck/Back cervical collar  -KJ      Recorded by [KJ] Cristela Chan PTA      Orthosis Management/Training    Orthosis Indications immobilize, protect/position healing structures  -KJ      Orthosis Skills Training doffing orthosis;donning orthosis  -KJ      Recorded by [KJ] Cristela Chan PTA      Positioning and Restraints    Pre-Treatment Position in bed  -KJ      Post Treatment Position bed  -KJ      Recorded by [KJ] Cristela Chan PTA        02/28/18 0228          Rehab  Assessment/Intervention    Discipline physical therapy assistant  -KJ      Document Type therapy note (daily note)  -KJ      Subjective Information agree to therapy  -KJ      Precautions/Limitations fall precautions;spinal precautions   aspen collar  -KJ      Recorded by [KJ] Cristela Chan PTA      Pain Assessment    Pain Assessment 0-10  -KJ      Pain Score 7  -KJ      Post Pain Score 8  -KJ      Pain Type Acute pain;Surgical pain  -KJ      Pain Location Neck  -KJ      Pain Orientation Posterior  -KJ      Pain Frequency Constant/continuous  -KJ      Pain Intervention(s) Ambulation/increased activity  -KJ      Response to Interventions tolerated  -KJ      Recorded by [KJ] Cristela Chan PTA      Bed Mobility, Assessment/Treatment    Bed Mob, Supine to Sit, [place] verbal cues required;minimum assist (75% patient effort)  -KJ      Bed Mob, Sit to Supine, [place] supervision required  -KJ      Bed Mobility, Safety Issues decreased use of arms for pushing/pulling;decreased use of legs for bridging/pushing  -KJ      Bed Mobility, Impairments strength decreased  -KJ      Bed Mobility, Comment vc's for log rolling  -KJ      Recorded by [KJ] Cristela Chan PTA      Transfer Assessment/Treatment    Transfers, Sit-Stand [place] verbal cues required;contact guard assist  -KJ      Transfers, Stand-Sit [place] verbal cues required;contact guard assist  -KJ      Transfers, Sit-Stand-Sit, Assist Device rolling walker  -KJ      Transfer, Safety Issues step length decreased  -KJ      Transfer, Impairments strength decreased  -KJ      Recorded by [KJ] Cristela Chan PTA      Gait Assessment/Treatment    Gait, [place] Level verbal cues required;contact guard assist  -KJ      Gait, Assistive Device rolling walker  -KJ      Gait, Distance (Feet) 75   x 3 standing rests  -KJ      Gait, Gait Deviations bilateral:;katerina decreased;forward flexed posture;step length decreased  -KJ      Gait, Impairments  strength decreased  -KJ      Recorded by [KJ] Cristela Chan PTA      Orthotics Prosthetics    Additional Documentation Orthosis Location (Group)  -KJ      Recorded by [KJ] Cristela Chan PTA      Orthosis Location    Orthosis Location/Type neck/back  -KJ      Orthosis, Neck/Back cervical collar  -KJ      Recorded by [KJ] Cristela Chan PTA      Orthosis Management/Training    Orthosis Indications immobilize, protect/position healing structures  -KJ      Orthosis Skills Training activity limitations;doffing orthosis;donning orthosis  -KJ      Recorded by [KJ] Cristela Chan PTA      Positioning and Restraints    Pre-Treatment Position in bed  -KJ      Recorded by [KJ] Cristela Chan PTA        User Key  (r) = Recorded By, (t) = Taken By, (c) = Cosigned By    Initials Name Effective Dates    KJ Cristela Chan PTA 08/02/16 -     TS BARRY Goodman/GUERLINE 08/02/16 -                 IP PT Goals       02/28/18 0912          Bed Mobility PT LTG    Bed Mobility PT LTG, Date Established 02/28/18  -PB (r) JM (t) PB (c)      Bed Mobility PT LTG, Time to Achieve by discharge  -PB (r) JM (t) PB (c)      Bed Mobility PT LTG, Activity Type all bed mobility  -PB (r) JM (t) PB (c)      Bed Mobility PT LTG, [place] Level conditional independence  -PB (r) JM (t) PB (c)      Bed Mobility PT Goal  LTG, Assist Device bed rails  -PB (r) JM (t) PB (c)      Transfer Training PT LTG    Transfer Training PT LTG, Date Established 02/28/18  -PB (r) JM (t) PB (c)      Transfer Training PT LTG, Time to Achieve by discharge  -PB (r) JM (t) PB (c)      Transfer Training PT LTG, [place] Level supervision required  -PB (r) JM (t) PB (c)      Transfer Training PT LTG, Assist Device walker, rolling  -PB (r) JM (t) PB (c)      Gait Training PT LTG    Gait Training Goal PT LTG, Date Established 02/28/18  -PB (r) JM (t) PB (c)      Gait Training Goal PT LTG, Time to Achieve by discharge  -PB (r) JM (t) PB (c)      Gait Training Goal  PT LTG, [place] Level supervision required  -PB (r) MIKO (t) PB (c)      Gait Training Goal PT LTG, Assist Device walker, rolling  -PB (r) MIKO (t) PB (c)      Gait Training Goal PT LTG, Distance to Achieve 800 feet  -PB (r) MIKO (t) PB (c)        User Key  (r) = Recorded By, (t) = Taken By, (c) = Cosigned By    Initials Name Provider Type    PB Jorge López, PT DPT Physical Therapist    MIKO Sanchez, PT Student PT Student          Physical Therapy Education     Title: PT OT SLP Therapies (Active)     Topic: Physical Therapy (Active)     Point: Mobility training (Active)    Learning Progress Summary    Learner Readiness Method Response Comment Documented by Status   Patient Acceptance E NR log rolling, bed mobility  03/01/18 1141 Active    Acceptance E VU Bed mobility techniques, t/f techniques, plan of care, benefits of activity.  02/28/18 0904 Done               Point: Precautions (Done)    Learning Progress Summary    Learner Readiness Method Response Comment Documented by Status   Patient Acceptance E VU Cervical collar management, spinal precautions.  02/28/18 0905 Done                      User Key     Initials Effective Dates Name Provider Type Discipline     08/02/16 -  Cristela Chan, PTA Physical Therapy Assistant PT     01/10/18 -  Tramaine Sanchez, PT Student PT Student PT                    PT Recommendation and Plan  Anticipated Discharge Disposition: home with assist  Planned Therapy Interventions: balance training, bed mobility training, gait training, orthotic fitting/training, patient/family education, strengthening, transfer training, postural re-education  PT Frequency: 2 times/day, per priority policy  Plan of Care Review  Plan Of Care Reviewed With: patient  Progress: progress toward functional goals as expected  Outcome Summary/Follow up Plan: PT tx completed. Pt supine in bed. rates neck pn 6/10. Min assist supine to sit, independent sit to supine. Transfers  S, amb 300'  x 2 with r wx. Performed 4 steps S. Recommend home health at time of dishcarge.          Outcome Measures       02/28/18 0903 02/28/18 0900       How much help from another person do you currently need...    Turning from your back to your side while in flat bed without using bedrails?  3  -PB (r) JM (t) PB (c)     Moving from lying on back to sitting on the side of a flat bed without bedrails?  3  -PB (r) JM (t) PB (c)     Moving to and from a bed to a chair (including a wheelchair)?  3  -PB (r) JM (t) PB (c)     Standing up from a chair using your arms (e.g., wheelchair, bedside chair)?  3  -PB (r) JM (t) PB (c)     Climbing 3-5 steps with a railing?  3  -PB (r) JM (t) PB (c)     To walk in hospital room?  3  -PB (r) JM (t) PB (c)     AM-PAC 6 Clicks Score  18  -PB (r) JM (t)     How much help from another is currently needed...    Putting on and taking off regular lower body clothing? 2  -AC (r) MK (t) AC (c)      Bathing (including washing, rinsing, and drying) 2  -AC (r) MK (t) AC (c)      Toileting (which includes using toilet bed pan or urinal) 3  -AC (r) MK (t) AC (c)      Putting on and taking off regular upper body clothing 2  -AC (r) MK (t) AC (c)      Taking care of personal grooming (such as brushing teeth) 3  -AC (r) MK (t) AC (c)      Eating meals 3  -AC (r) MK (t) AC (c)      Score 15  -AC (r) MK (t)      Functional Assessment    Outcome Measure Options AM-PAC 6 Clicks Daily Activity (OT)  -AC (r) MK (t) AC (c) AM-PAC 6 Clicks Basic Mobility (PT)  -PB (r) JM (t) PB (c)       User Key  (r) = Recorded By, (t) = Taken By, (c) = Cosigned By    Initials Name Provider Type    CHEL Smith, OTR/L Occupational Therapist    PB Jorge López, PT DPT Physical Therapist    MIKO Sanchez, PT Student PT Student    EZRA Landaverde, OT Student OT Student           Time Calculation:       Therapy Charges for Today     Code Description Service Date Service Provider Modifiers Qty    [id number]  GAIT  TRAINING EA 15 MIN 2/28/2018 Cristela Chan, PTA GP, KX 1          PT G-Codes  Outcome Measure Options: AM-PAC 6 Clicks Daily Activity (OT)  Score: 18  Functional Limitation: Mobility: Walking and moving around  Mobility: Walking and Moving Around Current Status (): At least 40 percent but less than 60 percent impaired, limited or restricted  Mobility: Walking and Moving Around Goal Status (): At least 20 percent but less than 40 percent impaired, limited or restricted    Cristela Chan PTA  3/1/2018

## 2018-03-01 NOTE — PLAN OF CARE
Problem: Patient Care Overview (Adult)  Goal: Plan of Care Review  Outcome: Ongoing (interventions implemented as appropriate)   03/01/18 1508   Coping/Psychosocial Response Interventions   Plan Of Care Reviewed With patient   Patient Care Overview   Progress progress towards functional goals is fair   Outcome Evaluation   Outcome Summary/Follow up Plan Pt transfers mod I and ambulates in room CGA. Pt mod A for LB dressing and max A to don/doff aspen collar. Pt provided education on aspen collar. Pt would benefit from HH at discharge. Continue OT POC

## 2018-03-01 NOTE — PLAN OF CARE
Problem: Patient Care Overview (Adult)  Goal: Plan of Care Review  Outcome: Ongoing (interventions implemented as appropriate)   03/01/18 0445   Coping/Psychosocial Response Interventions   Plan Of Care Reviewed With patient   Patient Care Overview   Progress improving   Outcome Evaluation   Outcome Summary/Follow up Plan PRN PO and IV meds for neck pain. Patient can be demanding at times. Voiding small amounts but c/o fullness and unable to void. bladder scan showed >999. I&O cath at 0445 to removed 950 ml. Collar intact. Removed to observe dressing and reapplied. Small amount of dried drainage on dressing. Up x 2. Patient unsteady on feet. IVF per orders. SCDs on bilaterally. Cont. to monitor.        Problem: Fall Risk (Adult)  Goal: Absence of Falls  Outcome: Ongoing (interventions implemented as appropriate)      Problem: Laminectomy/Foraminotomy/Discectomy (Adult)  Goal: Signs and Symptoms of Listed Potential Problems Will be Absent or Manageable (Laminectomy/Foraminotomy/Discectomy)  Outcome: Ongoing (interventions implemented as appropriate)

## 2018-03-01 NOTE — PROGRESS NOTES
Discharge Planning Assessment   [facility]     Patient Name: Vikas Cruz  MRN: [id number]  Today's Date: 3/1/2018    Admit Date: 2/27/2018          Discharge Needs Assessment       03/01/18 0815    Living Environment    Lives With spouse    Living Arrangements house    Transportation Available family or friend will provide;car    Living Environment    Quality Of Family Relationships helpful    Able to Return to Prior Living Arrangements yes    Discharge Needs Assessment    Concerns To Be Addressed no discharge needs identified;denies needs/concerns at this time    Readmission Within The Last 30 Days no previous admission in last 30 days    Equipment Currently Used at Home walker, rolling;bath bench    Discharge Planning Comments Patient lives with his spouse and states that he plans to return home at discharge. Patient has done well with therapy. No discharge planning needs identified.             Discharge Plan     None        Discharge Placement     No information found                Demographic Summary     None            Functional Status     None            Psychosocial     None            Abuse/Neglect     None            Legal     None            Substance Abuse     None            Patient Forms     None          SHARAD Sanders

## 2018-03-01 NOTE — THERAPY TREATMENT NOTE
Acute Care - Physical Therapy Treatment Note  [facility]     Patient Name: Vikas Cruz  : 1950  MRN: [id number]  Today's Date: 3/1/2018  Onset of Illness/Injury or Date of Surgery Date: 18  Date of Referral to PT: 18  Referring Physician: Dr. Donis    Admit Date: 2018    Visit Dx:    ICD-10-CM ICD-9-CM   1. Odontoid fracture with type II morphology S12.110A 805.02   2. Impaired mobility Z74.09 799.89   3. Impaired mobility and ADLs Z74.09 799.89     Patient Active Problem List   Diagnosis   • Odontoid fracture with type II morphology               Adult Rehabilitation Note       18 1456 18 1430 18 1116    Rehab Assessment/Intervention    Discipline physical therapy assistant  -KJ occupational therapy assistant  -TS physical therapy assistant  -KJ    Document Type therapy note (daily note)  -KJ therapy note (daily note)  -TS therapy note (daily note)  -KJ    Subjective Information agree to therapy  -KJ agree to therapy;no complaints  -TS agree to therapy;complains of;dizziness  -KJ    Patient Effort, Rehab Treatment good  -KJ good  -TS     Precautions/Limitations brace on when up;fall precautions;spinal precautions  -KJ brace on when up;fall precautions;spinal precautions   aspen collar  -TS fall precautions;brace on when up;spinal precautions  -KJ    Recorded by [KJ] Cristela Chan PTA [TS] BARRY Goodman/GUERLINE [KJ] Cristela Chan PTA    Pain Assessment    Pain Assessment No/denies pain  -KJ No/denies pain  -TS 0-10  -KJ    Pain Score   6  -KJ    Post Pain Score   7  -KJ    Pain Type   Acute pain;Surgical pain  -KJ    Pain Location   Neck  -KJ    Pain Orientation   Posterior  -KJ    Pain Frequency   Constant/continuous  -KJ    Pain Intervention(s)   Ambulation/increased activity  -KJ    Response to Interventions   tolerated  -KJ    Recorded by [KJ] Cristela Chan PTA [TS] BARRY Goodman/GUERLINE [KJ] Cristela Chan PTA    Cognitive Assessment/Intervention     Personal Safety Interventions  fall prevention program maintained;gait belt;nonskid shoes/slippers when out of bed  -TS     Recorded by  [TS] BARRY Goodman/L     Bed Mobility, Assessment/Treatment    Bed Mobility, Assistive Device  head of bed elevated  -TS     Bed Mob, Supine to Sit, [place]  minimum assist (75% patient effort)  -TS verbal cues required;minimum assist (75% patient effort)  -KJ    Bed Mob, Sit to Supine, [place] independent  -KJ  independent  -KJ    Bed Mobility, Safety Issues   decreased use of arms for pushing/pulling  -KJ    Bed Mobility, Impairments   strength decreased  -KJ    Bed Mobility, Comment   vc's for log rolling  -KJ    Recorded by [KJ] Cristela Chan PTA [TS] BARRY Goodman/GUERLINE [KJ] Cristela Chan PTA    Transfer Assessment/Treatment    Transfers, Sit-Stand [place] conditional independence  -KJ conditional independence  -TS conditional independence  -KJ    Transfers, Stand-Sit [place] conditional independence  -KJ conditional independence  -TS conditional independence  -KJ    Transfers, Sit-Stand-Sit, Assist Device   rolling walker  -KJ    Transfer, Safety Issues   step length decreased  -KJ    Transfer, Impairments   strength decreased  -KJ    Recorded by [KJ] Cristela Chan PTA [TS] BARRY Goodman/GUERLINE [KJ] Cristela Chan PTA    Gait Assessment/Treatment    Gait, [place] Level verbal cues required;supervision required  -KJ  verbal cues required;stand by assist  -KJ    Gait, Assistive Device rolling walker  -KJ  rolling walker  -KJ    Gait, Distance (Feet) 300  -KJ  300   standing rest 300' back to room  -KJ    Gait, Gait Deviations   bilateral:;forward flexed posture  -KJ    Gait, Safety Issues   sequencing ability decreased  -KJ    Gait, Impairments   strength decreased  -KJ    Recorded by [KJ] Cristela Chan PTA  [KJ] Cristela Chan PTA    Stairs Assessment/Treatment    Number of Stairs   4  -KJ    Stairs, Handrail  Location   left side (ascending)  -KJ    Stairs, [place] Level   independent  -KJ    Stairs, Technique Used   step over step (ascending);step over step (descending)  -KJ    Recorded by   [KJ] Cristela Chan PTA    Functional Mobility    Functional Mobility- Ind. Level  contact guard assist  -TS     Functional Mobility- Device  --   HHA  -TS     Functional Mobility- Comment  in room  -TS     Recorded by  [TS] BARRY Goodman/L     Upper Body Dressing Assessment/Training    UB Dressing Assess/Train, Clothing Type  donning:;doffing:   aspen collar for re adjustment  -TS     UB Dressing Assess/Train, Position  sitting  -TS     UB Dressing Assess/Train, [place]  maximum assist (25% patient effort)  -TS     Recorded by  [TS] BARRY Goodman/L     Lower Body Dressing Assessment/Training    LB Dressing Assess/Train, Clothing Type  donning:;doffing:;pants  -TS     LB Dressing Assess/Train, Position  sitting;standing  -TS     LB Dressing Assess/Train, [place]  moderate assist (50% patient effort)  -TS     Recorded by  [TS] JEN Goodman     Toileting Assessment/Training    Toileting Assess/Train, Assistive Device  urinal  -TS     Toileting Assess/Train, Position  standing  -TS     Toileting Assess/Train, Indepen Level  contact guard assist  -TS     Recorded by  [TS] JEN Goodman     Orthotics Prosthetics    Additional Documentation   Orthosis Location (Group)  -KJ    Recorded by   [KJ] Cristela Chan PTA    Orthosis Location    Orthosis Location/Type   neck/back  -KJ    Orthosis, Neck/Back   cervical collar  -KJ    Recorded by   [KJ] Cristela Chan PTA    Orthosis Management/Training    Orthosis Indications   immobilize, protect/position healing structures  -KJ    Orthosis Skills Training  doffing orthosis;donning orthosis;clothing management related to orthosis;purpose/goals of orthosis  -TS doffing orthosis;donning orthosis  -KJ    Recorded by  [TS] Sailaja  "BARRY Cruz/GUERLINE [KJ] Cristela Chan PTA    Positioning and Restraints    Pre-Treatment Position sitting in chair/recliner  -KJ in bed  -TS in bed  -KJ    Post Treatment Position bed  -KJ chair  -TS bed  -KJ    In Chair  sitting;call light within reach;encouraged to call for assist;with PT  -TS     Recorded by [KJ] Cristela Chan PTA [TS] BARRY Goodman/GUERLINE [KJ] Cristela Chan PTA      03/01/18 1024 03/01/18 1001 02/28/18 1337    Rehab Assessment/Intervention    Discipline occupational therapy assistant  -TS --  -KJ physical therapy assistant  -KJ    Document Type --  -TS --  -KJ therapy note (daily note)  -KJ    Subjective Information   agree to therapy  -KJ    Treatment Not Performed, Comment pt requested to come back later due to \"so much pain from not being able to pee\" Pt RN placing catheter this AM  -TS      Precautions/Limitations   fall precautions;spinal precautions   aspen collar  -KJ    Recorded by [TS] BARRY Goodman/GUERLINE [KJ] Cristela Chan PTA [KJ] Cristela Chan PTA    Pain Assessment    Pain Assessment   0-10  -KJ    Pain Score   7  -KJ    Post Pain Score   8  -KJ    Pain Type   Acute pain;Surgical pain  -KJ    Pain Location   Neck  -KJ    Pain Orientation   Posterior  -KJ    Pain Frequency   Constant/continuous  -KJ    Pain Intervention(s)   Ambulation/increased activity  -KJ    Response to Interventions   tolerated  -KJ    Recorded by   [KJ] Cristela Chan PTA    Bed Mobility, Assessment/Treatment    Bed Mob, Supine to Sit, [place]   verbal cues required;minimum assist (75% patient effort)  -KJ    Bed Mob, Sit to Supine, [place]   supervision required  -KJ    Bed Mobility, Safety Issues   decreased use of arms for pushing/pulling;decreased use of legs for bridging/pushing  -KJ    Bed Mobility, Impairments   strength decreased  -KJ    Bed Mobility, Comment   vc's for log rolling  -KJ    Recorded by   [KJ] Cristela Chan PTA    Transfer Assessment/Treatment    " Transfers, Sit-Stand [place]   verbal cues required;contact guard assist  -KJ    Transfers, Stand-Sit [place]   verbal cues required;contact guard assist  -KJ    Transfers, Sit-Stand-Sit, Assist Device   rolling walker  -KJ    Transfer, Safety Issues   step length decreased  -KJ    Transfer, Impairments   strength decreased  -KJ    Recorded by   [KJ] Cristela Chan PTA    Gait Assessment/Treatment    Gait, [place] Level   verbal cues required;contact guard assist  -KJ    Gait, Assistive Device   rolling walker  -KJ    Gait, Distance (Feet)   75   x 3 standing rests  -KJ    Gait, Gait Deviations   bilateral:;katerina decreased;forward flexed posture;step length decreased  -KJ    Gait, Impairments   strength decreased  -KJ    Recorded by   [KJ] Cristela Chan PTA    Orthotics Prosthetics    Additional Documentation   Orthosis Location (Group)  -KJ    Recorded by   [KJ] Cristela Chan PTA    Orthosis Location    Orthosis Location/Type   neck/back  -KJ    Orthosis, Neck/Back   cervical collar  -KJ    Recorded by   [KJ] Cristela Chan PTA    Orthosis Management/Training    Orthosis Indications   immobilize, protect/position healing structures  -KJ    Orthosis Skills Training   activity limitations;doffing orthosis;donning orthosis  -KJ    Recorded by   [KJ] Cristela Chan PTA    Positioning and Restraints    Pre-Treatment Position   in bed  -KJ    Recorded by   [KJ] Cristela Chan PTA      User Key  (r) = Recorded By, (t) = Taken By, (c) = Cosigned By    Initials Name Effective Dates    KATARZYNA Chan PTA 08/02/16 -     TS BARRY Goodman/GUERLINE 08/02/16 -                 IP PT Goals       02/28/18 0912          Bed Mobility PT LTG    Bed Mobility PT LTG, Date Established 02/28/18  -PB (r) JM (t) PB (c)      Bed Mobility PT LTG, Time to Achieve by discharge  -PB (r) JM (t) PB (c)      Bed Mobility PT LTG, Activity Type all bed mobility  -PB (r) JM (t) PB (c)      Bed Mobility PT LTG,  [place] Level conditional independence  -PB (r) JM (t) PB (c)      Bed Mobility PT Goal  LTG, Assist Device bed rails  -PB (r) JM (t) PB (c)      Transfer Training PT LTG    Transfer Training PT LTG, Date Established 02/28/18  -PB (r) JM (t) PB (c)      Transfer Training PT LTG, Time to Achieve by discharge  -PB (r) JM (t) PB (c)      Transfer Training PT LTG, [place] Level supervision required  -PB (r) JM (t) PB (c)      Transfer Training PT LTG, Assist Device walker, rolling  -PB (r) JM (t) PB (c)      Gait Training PT LTG    Gait Training Goal PT LTG, Date Established 02/28/18  -PB (r) JM (t) PB (c)      Gait Training Goal PT LTG, Time to Achieve by discharge  -PB (r) JM (t) PB (c)      Gait Training Goal PT LTG, [place] Level supervision required  -PB (r) JM (t) PB (c)      Gait Training Goal PT LTG, Assist Device walker, rolling  -PB (r) JM (t) PB (c)      Gait Training Goal PT LTG, Distance to Achieve 800 feet  -PB (r) JM (t) PB (c)        User Key  (r) = Recorded By, (t) = Taken By, (c) = Cosigned By    Initials Name Provider Type    PB Jorge López, PT DPT Physical Therapist    MIKO Sanchez, PT Student PT Student          Physical Therapy Education     Title: PT OT SLP Therapies (Active)     Topic: Physical Therapy (Active)     Point: Mobility training (Active)    Learning Progress Summary    Learner Readiness Method Response Comment Documented by Status   Patient Acceptance E NR log rolling, bed mobility  03/01/18 1141 Active    Acceptance E VU Bed mobility techniques, t/f techniques, plan of care, benefits of activity.  02/28/18 0904 Done               Point: Precautions (Done)    Learning Progress Summary    Learner Readiness Method Response Comment Documented by Status   Patient Acceptance E VU Cervical collar management, spinal precautions.  02/28/18 0905 Done                      User Key     Initials Effective Dates Name Provider Type Discipline     08/02/16 -  Cristela  GUERLINE Chan, PTA Physical Therapy Assistant PT     01/10/18 -  Tramaine Sanchez, PT Student PT Student PT                    PT Recommendation and Plan  Anticipated Discharge Disposition: home with assist  Planned Therapy Interventions: balance training, bed mobility training, gait training, orthotic fitting/training, patient/family education, strengthening, transfer training, postural re-education  PT Frequency: 2 times/day, per priority policy  Plan of Care Review  Plan Of Care Reviewed With: patient  Progress: progress toward functional goals as expected  Outcome Summary/Follow up Plan: PT tx completed. Pt supine in bed. rates neck pn 6/10. Min assist supine to sit, independent sit to supine. Transfers  S, amb 300' x 2 with r wx. Performed 4 steps S. Recommend home health at time of dishcarge.          Outcome Measures       03/01/18 1500 02/28/18 0903 02/28/18 0900    How much help from another person do you currently need...    Turning from your back to your side while in flat bed without using bedrails?   3  -PB (r) JM (t) PB (c)    Moving from lying on back to sitting on the side of a flat bed without bedrails?   3  -PB (r) JM (t) PB (c)    Moving to and from a bed to a chair (including a wheelchair)?   3  -PB (r) JM (t) PB (c)    Standing up from a chair using your arms (e.g., wheelchair, bedside chair)?   3  -PB (r) JM (t) PB (c)    Climbing 3-5 steps with a railing?   3  -PB (r) JM (t) PB (c)    To walk in hospital room?   3  -PB (r) JM (t) PB (c)    AM-PAC 6 Clicks Score   18  -PB (r) JM (t)    How much help from another is currently needed...    Putting on and taking off regular lower body clothing? 2  -TS 2  -AC (r) MK (t) AC (c)     Bathing (including washing, rinsing, and drying) 3  -TS 2  -AC (r) MK (t) AC (c)     Toileting (which includes using toilet bed pan or urinal) 3  -TS 3  -AC (r) MK (t) AC (c)     Putting on and taking off regular upper body clothing 2  -TS 2  -AC (r) MK (t) AC (c)      Taking care of personal grooming (such as brushing teeth) 3  -TS 3  -AC (r) MK (t) AC (c)     Eating meals 4  -TS 3  -AC (r) MK (t) AC (c)     Score 17  -TS 15  -AC (r) MK (t)     Functional Assessment    Outcome Measure Options AM-PAC 6 Clicks Daily Activity (OT)  -TS AM-PAC 6 Clicks Daily Activity (OT)  -AC (r) MK (t) AC (c) AM-PAC 6 Clicks Basic Mobility (PT)  -PB (r) JM (t) PB (c)      User Key  (r) = Recorded By, (t) = Taken By, (c) = Cosigned By    Initials Name Provider Type    AC Mario Alberto Smith, OTR/L Occupational Therapist    TS Sailaja Parra, REDDY/L Occupational Therapy Assistant    PB Jorge López, PT DPT Physical Therapist    JM Tramaine Sanchez, PT Student PT Student    EZRA Landaverde, OT Student OT Student           Time Calculation:         PT Charges       03/01/18 1116          Time Calculation    Start Time 1116  -KJ      Stop Time 1144  -KJ      Time Calculation (min) 28 min  -KJ      PT Received On 03/01/18  -KJ      PT Goal Re-Cert Due Date 03/10/18  -KJ      Time Calculation- PT    Total Timed Code Minutes- PT 28 minute(s)  -KJ        User Key  (r) = Recorded By, (t) = Taken By, (c) = Cosigned By    Initials Name Provider Type    KATARZYNA Chan PTA Physical Therapy Assistant          Therapy Charges for Today     Code Description Service Date Service Provider Modifiers Qty    17122919719 HC GAIT TRAINING EA 15 MIN 2/28/2018 Cristela Chan PTA GP, KX 1    98011660186 HC GAIT TRAINING EA 15 MIN 3/1/2018 Cristela Chan PTA GP, KX 2          PT G-Codes  Outcome Measure Options: AM-PAC 6 Clicks Daily Activity (OT)  Score: 18  Functional Limitation: Mobility: Walking and moving around  Mobility: Walking and Moving Around Current Status (): At least 40 percent but less than 60 percent impaired, limited or restricted  Mobility: Walking and Moving Around Goal Status (): At least 20 percent but less than 40 percent impaired, limited or restricted    Cristela Chan  PTA  3/1/2018

## 2018-03-01 NOTE — THERAPY TREATMENT NOTE
Acute Care - Occupational Therapy Treatment Note  [facility]     Patient Name: Vikas Cruz  : 1950  MRN: [id number]  Today's Date: 3/1/2018  Onset of Illness/Injury or Date of Surgery Date: 18  Date of Referral to OT: 18  Referring Physician: Dr. Donis      Admit Date: 2018    Visit Dx:     ICD-10-CM ICD-9-CM   1. Odontoid fracture with type II morphology S12.110A 805.02   2. Impaired mobility Z74.09 799.89   3. Impaired mobility and ADLs Z74.09 799.89     Patient Active Problem List   Diagnosis   • Odontoid fracture with type II morphology             Adult Rehabilitation Note       18 1456 18 1430 18 1116    Rehab Assessment/Intervention    Discipline physical therapy assistant  -KJ occupational therapy assistant  -TS physical therapy assistant  -KJ    Document Type  therapy note (daily note)  -TS therapy note (daily note)  -KJ    Subjective Information  agree to therapy;no complaints  -TS agree to therapy;complains of;dizziness  -KJ    Patient Effort, Rehab Treatment  good  -TS     Precautions/Limitations  brace on when up;fall precautions;spinal precautions   aspen collar  -TS fall precautions;brace on when up;spinal precautions  -KJ    Recorded by [KJ] Cristela Chan PTA [TS] BARRY Goodman/GUERLINE [KJ] Cristela Chan PTA    Pain Assessment    Pain Assessment  No/denies pain  -TS 0-10  -KJ    Pain Score   6  -KJ    Post Pain Score   7  -KJ    Pain Type   Acute pain;Surgical pain  -KJ    Pain Location   Neck  -KJ    Pain Orientation   Posterior  -KJ    Pain Frequency   Constant/continuous  -KJ    Pain Intervention(s)   Ambulation/increased activity  -KJ    Response to Interventions   tolerated  -KJ    Recorded by  [TS] BARRY Goodman/GUERLINE [KJ] Cristela Chan PTA    Cognitive Assessment/Intervention    Personal Safety Interventions  fall prevention program maintained;gait belt;nonskid shoes/slippers when out of bed  -TS     Recorded by  [TS] Sailaja SANCHEZ  BARRY Parra/L     Bed Mobility, Assessment/Treatment    Bed Mobility, Assistive Device  head of bed elevated  -TS     Bed Mob, Supine to Sit, [place]  minimum assist (75% patient effort)  -TS verbal cues required;minimum assist (75% patient effort)  -KJ    Bed Mob, Sit to Supine, [place]   independent  -KJ    Bed Mobility, Safety Issues   decreased use of arms for pushing/pulling  -KJ    Bed Mobility, Impairments   strength decreased  -KJ    Bed Mobility, Comment   vc's for log rolling  -KJ    Recorded by  [TS] BARRY Goodman/GUERLINE [KJ] Cristela Chan PTA    Transfer Assessment/Treatment    Transfers, Sit-Stand [place]  conditional independence  -TS conditional independence  -KJ    Transfers, Stand-Sit [place]  conditional independence  -TS conditional independence  -KJ    Transfers, Sit-Stand-Sit, Assist Device   rolling walker  -KJ    Transfer, Safety Issues   step length decreased  -KJ    Transfer, Impairments   strength decreased  -KJ    Recorded by  [TS] BARRY Goodman/GUERLINE [KJ] Cristela Chan PTA    Gait Assessment/Treatment    Gait, [place] Level   verbal cues required;stand by assist  -KJ    Gait, Assistive Device   rolling walker  -KJ    Gait, Distance (Feet)   300   standing rest 300' back to room  -KJ    Gait, Gait Deviations   bilateral:;forward flexed posture  -KJ    Gait, Safety Issues   sequencing ability decreased  -KJ    Gait, Impairments   strength decreased  -KJ    Recorded by   [KJ] Cristela Chan PTA    Stairs Assessment/Treatment    Number of Stairs   4  -KJ    Stairs, Handrail Location   left side (ascending)  -KJ    Stairs, [place] Level   independent  -KJ    Stairs, Technique Used   step over step (ascending);step over step (descending)  -KJ    Recorded by   [KJ] Cristela Chan PTA    Functional Mobility    Functional Mobility- Ind. Level  contact guard assist  -TS     Functional Mobility- Device  --   HHA  -TS     Functional Mobility-  Comment  in room  -TS     Recorded by  [TS] JEN Goodman     Upper Body Dressing Assessment/Training    UB Dressing Assess/Train, Clothing Type  donning:;doffing:   aspen collar for re adjustment  -TS     UB Dressing Assess/Train, Position  sitting  -TS     UB Dressing Assess/Train, [place]  maximum assist (25% patient effort)  -TS     Recorded by  [TS] JEN Goodman     Lower Body Dressing Assessment/Training    LB Dressing Assess/Train, Clothing Type  donning:;doffing:;pants  -TS     LB Dressing Assess/Train, Position  sitting;standing  -TS     LB Dressing Assess/Train, [place]  moderate assist (50% patient effort)  -TS     Recorded by  [TS] JEN Goodman     Toileting Assessment/Training    Toileting Assess/Train, Assistive Device  urinal  -TS     Toileting Assess/Train, Position  standing  -TS     Toileting Assess/Train, Indepen Level  contact guard assist  -TS     Recorded by  [TS] BARRY Goodman/L     Orthotics Prosthetics    Additional Documentation   Orthosis Location (Group)  -KJ    Recorded by   [KJ] Cristela Chan PTA    Orthosis Location    Orthosis Location/Type   neck/back  -KJ    Orthosis, Neck/Back   cervical collar  -KJ    Recorded by   [KJ] Cristela Chan PTA    Orthosis Management/Training    Orthosis Indications   immobilize, protect/position healing structures  -KJ    Orthosis Skills Training  doffing orthosis;donning orthosis;clothing management related to orthosis;purpose/goals of orthosis  -TS doffing orthosis;donning orthosis  -KJ    Recorded by  [TS] BARRY Goodman/GUERLINE [KJ] Cristela Chan PTA    Positioning and Restraints    Pre-Treatment Position  in bed  -TS in bed  -KJ    Post Treatment Position  chair  -TS bed  -KJ    In Chair  sitting;call light within reach;encouraged to call for assist;with PT  -TS     Recorded by  [TS] BARRY Goodman/GUERLINE [KJ] Cristela Chan PTA      03/01/18 1024 03/01/18 1001  "02/28/18 1337    Rehab Assessment/Intervention    Discipline occupational therapy assistant  -TS --  -KJ physical therapy assistant  -KJ    Document Type --  -TS --  -KJ therapy note (daily note)  -KJ    Subjective Information   agree to therapy  -KJ    Treatment Not Performed, Comment pt requested to come back later due to \"so much pain from not being able to pee\" Pt RN placing catheter this AM  -TS      Precautions/Limitations   fall precautions;spinal precautions   aspen collar  -KJ    Recorded by [TS] BARRY Goodman/GUERLINE [KJ] Cristela Chan PTA [KJ] Cristela Chan PTA    Pain Assessment    Pain Assessment   0-10  -KJ    Pain Score   7  -KJ    Post Pain Score   8  -KJ    Pain Type   Acute pain;Surgical pain  -KJ    Pain Location   Neck  -KJ    Pain Orientation   Posterior  -KJ    Pain Frequency   Constant/continuous  -KJ    Pain Intervention(s)   Ambulation/increased activity  -KJ    Response to Interventions   tolerated  -KJ    Recorded by   [KJ] Cristela Chan PTA    Bed Mobility, Assessment/Treatment    Bed Mob, Supine to Sit, [place]   verbal cues required;minimum assist (75% patient effort)  -KJ    Bed Mob, Sit to Supine, [place]   supervision required  -KJ    Bed Mobility, Safety Issues   decreased use of arms for pushing/pulling;decreased use of legs for bridging/pushing  -KJ    Bed Mobility, Impairments   strength decreased  -KJ    Bed Mobility, Comment   vc's for log rolling  -KJ    Recorded by   [KJ] Cristela Chan PTA    Transfer Assessment/Treatment    Transfers, Sit-Stand [place]   verbal cues required;contact guard assist  -KJ    Transfers, Stand-Sit [place]   verbal cues required;contact guard assist  -KJ    Transfers, Sit-Stand-Sit, Assist Device   rolling walker  -KJ    Transfer, Safety Issues   step length decreased  -KJ    Transfer, Impairments   strength decreased  -KJ    Recorded by   [KJ] Cristela Chan PTA    Gait Assessment/Treatment    Gait, [place] " Level   verbal cues required;contact guard assist  -KJ    Gait, Assistive Device   rolling walker  -KJ    Gait, Distance (Feet)   75   x 3 standing rests  -KJ    Gait, Gait Deviations   bilateral:;katerina decreased;forward flexed posture;step length decreased  -KJ    Gait, Impairments   strength decreased  -KJ    Recorded by   [KJ] Cristela Chan PTA    Orthotics Prosthetics    Additional Documentation   Orthosis Location (Group)  -KJ    Recorded by   [KJ] Cristela Chan PTA    Orthosis Location    Orthosis Location/Type   neck/back  -KJ    Orthosis, Neck/Back   cervical collar  -KJ    Recorded by   [KJ] Cristela Chan PTA    Orthosis Management/Training    Orthosis Indications   immobilize, protect/position healing structures  -KJ    Orthosis Skills Training   activity limitations;doffing orthosis;donning orthosis  -KJ    Recorded by   [KJ] Cristela Chan PTA    Positioning and Restraints    Pre-Treatment Position   in bed  -KJ    Recorded by   [KJ] Cristela Chan PTA      User Key  (r) = Recorded By, (t) = Taken By, (c) = Cosigned By    Initials Name Effective Dates    KJ Cristela Chan PTA 08/02/16 -     TS JEN Goodman 08/02/16 -                 OT Goals       02/28/18 0904          Transfer Training OT LTG    Transfer Training OT LTG, Date Established 02/28/18  -AC (r) MK (t) AC (c)      Transfer Training OT LTG, Time to Achieve by discharge  -AC (r) MK (t) AC (c)      Transfer Training OT LTG, Activity Type toilet;walk-in shower  -AC (r) MK (t) AC (c)      Transfer Training OT LTG, [place] Level conditional independence  -AC (r) MK (t) AC (c)      Transfer Training OT LTG, Assist Device walker, rolling  -AC (r) MK (t) AC (c)      Strength OT LTG    Strength Goal OT LTG, Date Established 02/28/18  -AC (r) MK (t) AC (c)      Strength Goal OT LTG, Time to Achieve by discharge  -AC (r) MK (t) AC (c)      Strength Goal OT LTG, Measure to Achieve increase BUE strength to 5/5 as needed for  ADL  -AC (r) MK (t) AC (c)      Bathing OT LTG    Bathing Goal OT LTG, Date Established 02/28/18  -AC (r) MK (t) AC (c)      Bathing Goal OT LTG, Time to Achieve by discharge  -AC (r) MK (t) AC (c)      Bathing Goal OT LTG, Activity Type upper body bathing;lower body bathing  -AC (r) MK (t) AC (c)      Bathing Goal OT LTG, [place] Level minimum assist (75% patient effort)  -AC (r) MK (t) AC (c)      Bathing Goal OT LTG, Additional Goal AE prn  -AC (r) MK (t) AC (c)      LB Dressing OT LTG    LB Dressing Goal OT LTG, Date Established 02/28/18  -AC (r) MK (t) AC (c)      LB Dressing Goal OT LTG, Time to Achieve by discharge  -AC (r) MK (t) AC (c)      LB Dressing Goal OT LTG, [place] Level minimum assist (75% patient effort)  -AC (r) MK (t) AC (c)      LB Dressing Goal OT LTG, Additional Goal AE prn  -AC (r) MK (t) AC (c)        User Key  (r) = Recorded By, (t) = Taken By, (c) = Cosigned By    Initials Name Provider Type    CHEL Smith, OTR/L Occupational Therapist    EZRA Landaverde, OT Student OT Student          Occupational Therapy Education     Title: PT OT SLP Therapies (Active)     Topic: Occupational Therapy (Done)     Point: ADL training (Done)    Description: Instruct learner(s) on proper safety adaptation and remediation techniques during self care or transfers.   Instruct in proper use of assistive devices.    Learning Progress Summary    Learner Readiness Method Response Comment Documented by Status   Patient Acceptance E,D VU,NR UB/LB dressing, aspen collar, home safety TS 03/01/18 1506 Done    Acceptance E MELINA,DU log roll technique, transfer training/safety, activity modifications, orthosis wear/care/maintenance, skin assessment  02/28/18 0936 Done               Point: Precautions (Done)    Description: Instruct learner(s) on prescribed precautions during self-care and functional transfers.    Learning Progress Summary    Learner Readiness Method Response Comment Documented by Status    Patient Acceptance E,D VU,NR UB/LB dressing, aspen collar, home safety  03/01/18 1506 Done    Acceptance E VU,DU log roll technique, transfer training/safety, activity modifications, orthosis wear/care/maintenance, skin assessment  02/28/18 0936 Done               Point: Body mechanics (Done)    Description: Instruct learner(s) on proper positioning and spine alignment during self-care, functional mobility activities and/or exercises.    Learning Progress Summary    Learner Readiness Method Response Comment Documented by Status   Patient Acceptance E VU,DU log roll technique, transfer training/safety, activity modifications, orthosis wear/care/maintenance, skin assessment  02/28/18 0936 Done                      User Key     Initials Effective Dates Name Provider Type Discipline     08/02/16 -  BARRY Goodman/L Occupational Therapy Assistant OT     11/20/17 -  Ariana Landaverde, OT Student OT Student OT                  OT Recommendation and Plan  Anticipated Discharge Disposition: home with assist  Planned Therapy Interventions: ADL retraining, balance training, bed mobility training, orthotic fitting/training, strengthening, transfer training  Therapy Frequency: 3-5 times/wk  Plan of Care Review  Plan Of Care Reviewed With: patient  Progress: progress towards functional goals is fair  Outcome Summary/Follow up Plan: Pt transfers mod I and ambulates in room CGA. Pt mod A for LB dressing and max A to don/doff aspen collar. Pt provided education on aspen collar. Pt would benefit from HH at discharge. Continue OT POC         Outcome Measures       03/01/18 1500 02/28/18 0903 02/28/18 0900    How much help from another person do you currently need...    Turning from your back to your side while in flat bed without using bedrails?   3  -PB (r) JM (t) PB (c)    Moving from lying on back to sitting on the side of a flat bed without bedrails?   3  -PB (r) JM (t) PB (c)    Moving to and from a bed to a chair  (including a wheelchair)?   3  -PB (r) JM (t) PB (c)    Standing up from a chair using your arms (e.g., wheelchair, bedside chair)?   3  -PB (r) JM (t) PB (c)    Climbing 3-5 steps with a railing?   3  -PB (r) JM (t) PB (c)    To walk in hospital room?   3  -PB (r) JM (t) PB (c)    AM-PAC 6 Clicks Score   18  -PB (r) JM (t)    How much help from another is currently needed...    Putting on and taking off regular lower body clothing? 2  -TS 2  -AC (r) MK (t) AC (c)     Bathing (including washing, rinsing, and drying) 3  -TS 2  -AC (r) MK (t) AC (c)     Toileting (which includes using toilet bed pan or urinal) 3  -TS 3  -AC (r) MK (t) AC (c)     Putting on and taking off regular upper body clothing 2  -TS 2  -AC (r) MK (t) AC (c)     Taking care of personal grooming (such as brushing teeth) 3  -TS 3  -AC (r) MK (t) AC (c)     Eating meals 4  -TS 3  -AC (r) MK (t) AC (c)     Score 17  -TS 15  -AC (r) MK (t)     Functional Assessment    Outcome Measure Options AM-PAC 6 Clicks Daily Activity (OT)  -TS AM-PAC 6 Clicks Daily Activity (OT)  -AC (r) MK (t) AC (c) AM-PAC 6 Clicks Basic Mobility (PT)  -PB (r) JM (t) PB (c)      User Key  (r) = Recorded By, (t) = Taken By, (c) = Cosigned By    Initials Name Provider Type    AC Mario Alberto Smith, OTR/L Occupational Therapist    TS Sailaja Parra REDDY/L Occupational Therapy Assistant    GAIL López, PT DPT Physical Therapist    MIKO Sanchez, PT Student PT Student    EZRA Landaverde, OT Student OT Student           Time Calculation:         Time Calculation- OT       03/01/18 1509          Time Calculation- OT    OT Start Time 1430  -TS      OT Stop Time 1500  -TS      OT Time Calculation (min) 30 min  -TS      Total Timed Code Minutes- OT 30 minute(s)  -TS      OT Received On 03/01/18  -TS        User Key  (r) = Recorded By, (t) = Taken By, (c) = Cosigned By    Initials Name Provider Type    TS BARRY Goodman/L Occupational Therapy Assistant            Therapy Charges for Today     Code Description Service Date Service Provider Modifiers Qty    29356097008 HC OT SELF CARE/MGMT/TRAIN EA 15 MIN 3/1/2018 BARRY Goodman/L GO, KX 2          OT G-codes  OT Professional Judgement Used?: Yes  OT Functional Scales Options: AM-PAC 6 Clicks Daily Activity (OT)  Score: 15  Functional Limitation: Self care  Self Care Current Status (): At least 40 percent but less than 60 percent impaired, limited or restricted  Self Care Goal Status (): At least 20 percent but less than 40 percent impaired, limited or restricted    JEN Lees  3/1/2018

## 2018-03-01 NOTE — PLAN OF CARE
Problem: Patient Care Overview (Adult)  Goal: Plan of Care Review  Outcome: Ongoing (interventions implemented as appropriate)   03/01/18 1641   Coping/Psychosocial Response Interventions   Plan Of Care Reviewed With patient   Patient Care Overview   Progress improving   Outcome Evaluation   Outcome Summary/Follow up Plan alert and oriented. Pt continues to show signes of urinary retention. c/o frequency and voiding approimately 100ml at a time. In and out cathed this am .with 1260out. Pt is very abusive when talking to staff. Cursing and demanding. collar on.      Goal: Adult Individualization and Mutuality  Outcome: Ongoing (interventions implemented as appropriate)    Goal: Discharge Needs Assessment  Outcome: Ongoing (interventions implemented as appropriate)      Problem: Fall Risk (Adult)  Goal: Absence of Falls  Outcome: Ongoing (interventions implemented as appropriate)

## 2018-03-01 NOTE — PROGRESS NOTES
"Vikas Cruz  67 y.o.      Chief complaint:   Neck pain, urinary retention    Subjective  No events    Temp:  [97.9 °F (36.6 °C)-100.1 °F (37.8 °C)] 97.9 °F (36.6 °C)  Heart Rate:  [] 87  Resp:  [18-20] 18  BP: ()/(66-82) 119/69      Objective:  General Appearance:  Comfortable, well-appearing, in no acute distress and in pain.    Vital signs: (most recent): Blood pressure 119/69, pulse 87, temperature 97.9 °F (36.6 °C), temperature source Oral, resp. rate 18, height 177.8 cm (70\"), weight 83.5 kg (184 lb), SpO2 95 %.  Vital signs are normal.  No fever.    Output: Producing urine.    Lungs:  Normal respiratory rate and normal effort.    Heart: Normal rate.  Regular rhythm.    Skin:  Warm and dry.        Neurologic Exam     Mental Status   Oriented to person, place, and time.   Attention: normal. Concentration: normal.   Speech: speech is normal   Level of consciousness: alert    Cranial Nerves   Cranial nerves II through XII intact.     Motor Exam   Muscle bulk: normal    Strength   Strength 5/5 throughout.     Sensory Exam   Light touch normal.       Active Problems:    Odontoid fracture with type II morphology      Lab Results (last 24 hours)     Procedure Component Value Units Date/Time    POC Glucose Once [[id number]]  (Abnormal) Collected:  02/28/18 1259    Specimen:  Blood Updated:  02/28/18 1310     Glucose 141 (H) mg/dL       : [id number] [facility] ChristineMeter ID: [id number]       CBC & Differential [[id number]] Collected:  03/01/18 0444    Specimen:  Blood Updated:  03/01/18 0532    Narrative:       The following orders were created for panel order CBC & Differential.  Procedure                               Abnormality         Status                     ---------                               -----------         ------                     CBC Auto Differential[[id number]]        Abnormal            Final result                 Please view results for these tests on the individual " orders.    CBC Auto Differential [[id number]]  (Abnormal) Collected:  03/01/18 0444    Specimen:  Blood Updated:  03/01/18 0532     WBC 5.63 10*3/mm3      RBC 2.79 (L) 10*6/mm3      Hemoglobin 8.6 (L) g/dL      Hematocrit 25.2 (L) %      MCV 90.3 fL      MCH 30.8 pg      MCHC 34.1 g/dL      RDW 14.6 %      RDW-SD 48.7 fl      MPV 9.9 fL      Platelets 104 (L) 10*3/mm3      Neutrophil % 64.2 %      Lymphocyte % 26.5 %      Monocyte % 7.5 %      Eosinophil % 1.2 %      Basophil % 0.2 %      Immature Grans % 0.4 %      Neutrophils, Absolute 3.62 10*3/mm3      Lymphocytes, Absolute 1.49 10*3/mm3      Monocytes, Absolute 0.42 10*3/mm3      Eosinophils, Absolute 0.07 10*3/mm3      Basophils, Absolute 0.01 10*3/mm3      Immature Grans, Absolute 0.02 10*3/mm3      nRBC 0.0 /100 WBC               Plan:   Status post C1 to C4 posterior fusion.  Patient is doing well.  Continue with physical therapy and occupational therapy.  He is having trouble with urinary retention.  We will start the patient on Flomax.  I will DC the NILSA drain.  Possible discharge either tomorrow or Saturday.    Silverio Montanez, APRN

## 2018-03-02 VITALS
DIASTOLIC BLOOD PRESSURE: 56 MMHG | HEIGHT: 70 IN | TEMPERATURE: 98.6 F | RESPIRATION RATE: 18 BRPM | WEIGHT: 184 LBS | HEART RATE: 95 BPM | BODY MASS INDEX: 26.34 KG/M2 | OXYGEN SATURATION: 100 % | SYSTOLIC BLOOD PRESSURE: 111 MMHG

## 2018-03-02 PROCEDURE — 25010000002 MORPHINE SULFATE (PF) 2 MG/ML SOLUTION: Performed by: NEUROLOGICAL SURGERY

## 2018-03-02 PROCEDURE — 97530 THERAPEUTIC ACTIVITIES: CPT

## 2018-03-02 PROCEDURE — 99024 POSTOP FOLLOW-UP VISIT: CPT | Performed by: NURSE PRACTITIONER

## 2018-03-02 RX ORDER — TERAZOSIN 1 MG/1
1 CAPSULE ORAL 2 TIMES DAILY
COMMUNITY
End: 2018-03-07 | Stop reason: HOSPADM

## 2018-03-02 RX ORDER — FUROSEMIDE 40 MG/1
40 TABLET ORAL DAILY
COMMUNITY
End: 2018-03-07 | Stop reason: HOSPADM

## 2018-03-02 RX ORDER — OXYCODONE HYDROCHLORIDE 20 MG/1
20 TABLET ORAL EVERY 8 HOURS PRN
Qty: 90 TABLET | Refills: 0
Start: 2018-03-02 | End: 2019-07-01

## 2018-03-02 RX ORDER — CARISOPRODOL 350 MG/1
350 TABLET ORAL EVERY 8 HOURS PRN
Qty: 90 TABLET | Refills: 0 | Status: SHIPPED | OUTPATIENT
Start: 2018-03-02 | End: 2018-03-10

## 2018-03-02 RX ORDER — MELOXICAM 15 MG/1
15 TABLET ORAL DAILY
COMMUNITY
End: 2018-06-19 | Stop reason: ALTCHOICE

## 2018-03-02 RX ORDER — ZOLPIDEM TARTRATE 10 MG/1
10 TABLET ORAL NIGHTLY PRN
COMMUNITY
End: 2018-05-10 | Stop reason: DRUGHIGH

## 2018-03-02 RX ORDER — TAMSULOSIN HYDROCHLORIDE 0.4 MG/1
0.4 CAPSULE ORAL DAILY
Qty: 30 CAPSULE | Refills: 0 | Status: SHIPPED | OUTPATIENT
Start: 2018-03-02 | End: 2018-10-29

## 2018-03-02 RX ADMIN — DESMOPRESSIN ACETATE 40 MG: 0.2 TABLET ORAL at 08:46

## 2018-03-02 RX ADMIN — CARISOPRODOL 350 MG: 350 TABLET ORAL at 10:09

## 2018-03-02 RX ADMIN — CHLORTHALIDONE 25 MG: 25 TABLET ORAL at 08:46

## 2018-03-02 RX ADMIN — MORPHINE SULFATE 2 MG: 2 INJECTION, SOLUTION INTRAMUSCULAR; INTRAVENOUS at 03:45

## 2018-03-02 RX ADMIN — GABAPENTIN 300 MG: 300 CAPSULE ORAL at 08:46

## 2018-03-02 RX ADMIN — MORPHINE SULFATE 2 MG: 2 INJECTION, SOLUTION INTRAMUSCULAR; INTRAVENOUS at 09:55

## 2018-03-02 RX ADMIN — OXYCODONE HYDROCHLORIDE 20 MG: 5 TABLET ORAL at 05:53

## 2018-03-02 RX ADMIN — OXYCODONE HYDROCHLORIDE 20 MG: 5 TABLET ORAL at 12:03

## 2018-03-02 RX ADMIN — TAMSULOSIN HYDROCHLORIDE 0.4 MG: 0.4 CAPSULE ORAL at 08:46

## 2018-03-02 RX ADMIN — LOSARTAN POTASSIUM 100 MG: 50 TABLET ORAL at 08:46

## 2018-03-02 RX ADMIN — POTASSIUM CHLORIDE 10 MEQ: 750 CAPSULE, EXTENDED RELEASE ORAL at 08:46

## 2018-03-02 NOTE — THERAPY DISCHARGE NOTE
Acute Care - Occupational Therapy Discharge Summary  [facility]     Patient Name: Vikas Cruz  : 1950  MRN: [id number]    Today's Date: 3/2/2018  Onset of Illness/Injury or Date of Surgery Date: 18    Date of Referral to OT: 18  Referring Physician: Dr. Donis      Admit Date: 2018        OT Recommendation and Plan    Visit Dx:    ICD-10-CM ICD-9-CM   1. Odontoid fracture with type II morphology S12.110A 805.02   2. Impaired mobility Z74.09 799.89   3. Impaired mobility and ADLs Z74.09 799.89                     OT Goals       18 1607 18 0904       Transfer Training OT LTG    Transfer Training OT LTG, Date Established  18  -AC (r) MK (t) AC (c)     Transfer Training OT LTG, Time to Achieve  by discharge  -AC (r) MK (t) AC (c)     Transfer Training OT LTG, Activity Type  toilet;walk-in shower  -AC (r) MK (t) AC (c)     Transfer Training OT LTG, [place] Level  conditional independence  -AC (r) MK (t) AC (c)     Transfer Training OT LTG, Assist Device  walker, rolling  -AC (r) MK (t) AC (c)     Transfer Training OT LTG, Date Goal Reviewed 18  -TR      Transfer Training OT LTG, Outcome goal not met  -TR      Transfer Training OT LTG, Reason Goal Not Met discharged from facility  -TR      Strength OT LTG    Strength Goal OT LTG, Date Established  18  -AC (r) MK (t) AC (c)     Strength Goal OT LTG, Time to Achieve  by discharge  -AC (r) MK (t) AC (c)     Strength Goal OT LTG, Measure to Achieve  increase BUE strength to 5/5 as needed for ADL  -AC (r) MK (t) AC (c)     Strength Goal OT LTG, Date Goal Reviewed 18  -TR      Strength Goal OT LTG, Outcome goal not met  -TR      Strength Goal OT LTG, Reason Goal Not Met discharged from facility  -TR      Bathing OT LTG    Bathing Goal OT LTG, Date Established  18  -AC (r) MK (t) AC (c)     Bathing Goal OT LTG, Time to Achieve  by discharge  -AC (r) MK (t) AC (c)     Bathing Goal OT LTG, Activity Type  upper  body bathing;lower body bathing  -AC (r) MK (t) AC (c)     Bathing Goal OT LTG, [place] Level  minimum assist (75% patient effort)  -AC (r) MK (t) AC (c)     Bathing Goal OT LTG, Additional Goal  AE prn  -AC (r) MK (t) AC (c)     Bathing Goal OT LTG, Date Goal Reviewed 03/02/18  -TR      Bathing Goal OT LTG, Outcome goal not met  -TR      Bathing Goal OT LTG, Reason Goal Not Met discharged from facility  -TR      LB Dressing OT LTG    LB Dressing Goal OT LTG, Date Established  02/28/18  -AC (r) MK (t) AC (c)     LB Dressing Goal OT LTG, Time to Achieve  by discharge  -AC (r) MK (t) AC (c)     LB Dressing Goal OT LTG, [place] Level  minimum assist (75% patient effort)  -AC (r) MK (t) AC (c)     LB Dressing Goal OT LTG, Additional Goal  AE prn  -AC (r) MK (t) AC (c)     LB Dressing Goal OT LTG, Date Goal Reviewed 03/02/18  -TR      LB Dressing Goal OT LTG, Outcome goal not met  -TR      LB Dressing Goal OT LTG, Reason Goal Not Met discharged from facility  -TR        User Key  (r) = Recorded By, (t) = Taken By, (c) = Cosigned By    Initials Name Provider Type    CHEL Smith, OTR/L Occupational Therapist    SAM Carter, OTR/L Occupational Therapist    EZRA Landaverde, OT Student OT Student                Outcome Measures       03/01/18 1500 02/28/18 0903 02/28/18 0900    How much help from another person do you currently need...    Turning from your back to your side while in flat bed without using bedrails?   3  -PB (r) JM (t) PB (c)    Moving from lying on back to sitting on the side of a flat bed without bedrails?   3  -PB (r) JM (t) PB (c)    Moving to and from a bed to a chair (including a wheelchair)?   3  -PB (r) JM (t) PB (c)    Standing up from a chair using your arms (e.g., wheelchair, bedside chair)?   3  -PB (r) JM (t) PB (c)    Climbing 3-5 steps with a railing?   3  -PB (r) MIKO (flaca) GAIL (c)    To walk in hospital room?   3  -PB (r) MIKO (flaca) GAIL (c)    -PAC 6 Clicks Score   18  -PB (r)  JM (t)    How much help from another is currently needed...    Putting on and taking off regular lower body clothing? 2  -TS 2  -AC (r) MK (t) AC (c)     Bathing (including washing, rinsing, and drying) 3  -TS 2  -AC (r) MK (t) AC (c)     Toileting (which includes using toilet bed pan or urinal) 3  -TS 3  -AC (r) MK (t) AC (c)     Putting on and taking off regular upper body clothing 2  -TS 2  -AC (r) MK (t) AC (c)     Taking care of personal grooming (such as brushing teeth) 3  -TS 3  -AC (r) MK (t) AC (c)     Eating meals 4  -TS 3  -AC (r) MK (t) AC (c)     Score 17  -TS 15  -AC (r) MK (t)     Functional Assessment    Outcome Measure Options AM-PAC 6 Clicks Daily Activity (OT)  -TS AM-PAC 6 Clicks Daily Activity (OT)  -AC (r) MK (t) AC (c) AM-PAC 6 Clicks Basic Mobility (PT)  -PB (r) JM (t) PB (c)      User Key  (r) = Recorded By, (t) = Taken By, (c) = Cosigned By    Initials Name Provider Type    CHEL Smith, OTR/L Occupational Therapist    CLARA Parra REDDY/L Occupational Therapy Assistant    GAIL López, PT DPT Physical Therapist    MIKO Sanchez, PT Student PT Student    EZRA Landaverde, OT Student OT Student              OT Discharge Summary  Anticipated Discharge Disposition: home  Reason for Discharge: Discharge from facility  Outcomes Achieved: Refer to plan of care for updates on goals achieved  Discharge Destination: Home      Sailaja Carter OTR/L  3/2/2018

## 2018-03-02 NOTE — PLAN OF CARE
Problem: Patient Care Overview (Adult)  Goal: Plan of Care Review  Outcome: Ongoing (interventions implemented as appropriate)   03/02/18 0324   Coping/Psychosocial Response Interventions   Plan Of Care Reviewed With patient   Patient Care Overview   Progress no change   Outcome Evaluation   Outcome Summary/Follow up Plan PT CO pain, prn meds as ordered. PT denies feeling any urge to void/retention, voiding larger amounts each time he goes. NO NV changes, DRSG CDI. Free from falls.        Problem: Fall Risk (Adult)  Goal: Absence of Falls  Outcome: Ongoing (interventions implemented as appropriate)      Problem: Laminectomy/Foraminotomy/Discectomy (Adult)  Goal: Signs and Symptoms of Listed Potential Problems Will be Absent or Manageable (Laminectomy/Foraminotomy/Discectomy)  Outcome: Ongoing (interventions implemented as appropriate)

## 2018-03-02 NOTE — DISCHARGE SUMMARY
Date of Discharge:  3/2/2018    Discharge Diagnosis: C1 and 2 fracture    Presenting Problem/History of Present Illness  Odontoid fracture with type II morphology [S12.110A]  Odontoid fracture with type II morphology [S12.110A]       Hospital Course  Patient is a 67 y.o. male presented with a fall after he was walking his dog and got tangled up in the leash causing him to fall and hit his head on the floor.  He did have a brief loss of consciousness.  He had an increase in his neck pain and was taken to an outside emergency room where was discovered that he had a C1 and C2 fracture.  He was immobilized in a cervical collar and brought here for neurosurgical evaluation.  He was not complaining of any upper extremity radicular pain or weakness.  The patient does have chronic neck pain.  He is currently taking oxycodone for his pain issues.  He has had previous cervical spine surgery.  He normally walks with a walker.  Because of his fractures he was taken to the operating room on 02/27/2017 for C1 to C4 posterior instrumented fusion the patient tolerated procedure well.  He has been ambulating with physical therapy.  He is tolerating by mouth.  He is having difficulty with urination.  He had to have in and out catheters and was still having trouble with urinary retention.  He was started on Flomax 0.4 mg daily.  We did end up placing a regular Aldana catheter.  It was discussed with urology and they recommended him following up with him in a week for reevaluation of the need for the Aldana catheter.  We will have home health coming to see the patient both nursing, physical and occupational therapy for further medical evaluation for both the dressing in his neck and assessment of the Aldana catheter as well as for gait training and strengthening..      Procedures Performed  Procedure(s):  CERVICAL  POSTERIOR INSTRUMENTED FUSION C1-4       Consults:   Consults     No orders found from 1/29/2018 to 2/28/2018.               Condition on Discharge:  Stable    Vital Signs  Temp:  [98.2 °F (36.8 °C)-99 °F (37.2 °C)] 98.6 °F (37 °C)  Heart Rate:  [] 95  Resp:  [16-20] 18  BP: ()/(55-67) 111/56    Physical Exam:   Physical Exam   Constitutional: He is oriented to person, place, and time. He appears well-developed and well-nourished.   HENT:   Head: Normocephalic.   Eyes: EOM are normal. Pupils are equal, round, and reactive to light.   Neck: Normal range of motion.   Pulmonary/Chest: Effort normal.   Genitourinary:   Genitourinary Comments: Aldana catheter in place   Musculoskeletal: Normal range of motion.   Neurological: He is alert and oriented to person, place, and time. He has normal strength and normal reflexes. No cranial nerve deficit or sensory deficit. Gait normal. GCS eye subscore is 4. GCS verbal subscore is 5. GCS motor subscore is 6.   Skin: Skin is warm.   Incision clean dry and intact   Psychiatric: He has a normal mood and affect. His speech is normal and behavior is normal. Thought content normal.        Neurologic Exam     Mental Status   Oriented to person, place, and time.   Speech: speech is normal     Cranial Nerves     CN III, IV, VI   Pupils are equal, round, and reactive to light.  Extraocular motions are normal.     Motor Exam     Strength   Strength 5/5 throughout.          Discharge Disposition  Home or Self Care    Discharge Medications   Vikas Cruz   [place] Medication Instructions YAMILET:[id number]    Printed on:03/02/18 0148   Medication Information                      ALPRAZolam (XANAX) 1 MG tablet  Take 1 mg by mouth 3 (Three) Times a Day As Needed for Anxiety.             atorvastatin (LIPITOR) 40 MG tablet  Take 40 mg by mouth Daily.             carisoprodol (SOMA) 350 MG tablet  Take 1 tablet by mouth Every 8 (Eight) Hours As Needed for Muscle Spasms for up to 8 days.             chlorthalidone (HYGROTON) 25 MG tablet  Take 25 mg by mouth Daily.             gabapentin (NEURONTIN) 300  MG capsule  Take 300 mg by mouth 3 (Three) Times a Day.             losartan (COZAAR) 50 MG tablet  Take 100 mg by mouth Daily.             oxyCODONE (ROXICODONE) 20 MG tablet  Take 1 tablet by mouth Every 8 (Eight) Hours As Needed for Moderate Pain .             potassium chloride (MICRO-K) 10 MEQ CR capsule  Take 10 mEq by mouth 2 (Two) Times a Day.             tamsulosin (FLOMAX) 0.4 MG capsule 24 hr capsule  Take 1 capsule by mouth Daily.                 Discharge Diet:   Diet Instructions     Diet: Regular; Thin       Discharge Diet:  Regular   Fluid Consistency:  Thin                 Activity at Discharge:   Activity Instructions     Discharge Activity Restrictions       1) No driving for till seen in office and no longer taking narcotics.   2) May shower / sponge bathe in 72 hours.  3) Do not lift / push / pull more then 5 lbs.  4) Wear collar at all times                 Follow-up Appointments  No future appointments.  Additional Instructions for the Follow-ups that You Need to Schedule     Ambulatory Referral to Home Health    As directed    Face to Face Visit Date:  3/2/2018    Follow-up Provider for Plan of Care?:  I will be treating the patient on an ongoing basis.  Please send me the Plan of Care for signature.    Follow-up Provider:  CHING KEY [[id number]]    Reason/Clinical Findings:  cervical fracture, jimenez catheter for urinary retention    Describe mobility limitations that make leaving home difficult:  cervical collar and jimenez catheter    Nursing/Therapeutic Services Requested:  Skilled Nursing Physical Therapy Occupational Therapy    Skilled nursing orders:  Medication education Monthly catheter care Wound care dressing/changes Cardiopulmonary assessments Neurovascular assessments    Instructions:  posterior neckincision must be changed daily and clean with soap and water    PT orders:  Gait Training Therapeutic exercise Strengthening Home safety assessment    Weight Bearing Status:  Full  Weight Bearing    Occupational orders:  Activities of daily living Energy conservation Strengthening Fine motor Home safety assessment    Frequency:  1 Week 1           Call MD With Problems / Concerns    As directed    Instructions: Worsening neck or arm pain, drainage from wound, fever, difficulty breathing or swallowing.   Order Comments:  Instructions: Worsening neck or arm pain, drainage from wound, fever, difficulty breathing or swallowing.            Discharge Follow-up with Specialty: trang amador np; 3 Weeks    As directed    Specialty:  trang amador np    Follow Up:  3 Weeks                     Test Results Pending at Discharge       ROXIE Mcgarry  03/02/18  8:50 AM    Time: Discharge 30 min

## 2018-03-02 NOTE — PLAN OF CARE
Problem: Patient Care Overview (Adult)  Goal: Plan of Care Review  Outcome: Ongoing (interventions implemented as appropriate)   03/02/18 1032   Coping/Psychosocial Response Interventions   Plan Of Care Reviewed With patient   Patient Care Overview   Progress progress toward functional goals as expected   Outcome Evaluation   Outcome Summary/Follow up Plan PT tx completed. Pt supine in bed c/o brace hurting left side of neck. I put extra padding and re-adjusted collar. He seemed to think it felt better. I bed mobility and transfers. Amb in room I with wx. Plan for discharge today.

## 2018-03-02 NOTE — PLAN OF CARE
Problem: Inpatient Physical Therapy  Goal: Bed Mobility Goal LTG- PT  Outcome: Outcome(s) achieved Date Met: 03/02/18 03/02/18 1550   Bed Mobility PT LTG   Bed Mobility PT LTG, Date Goal Reviewed 03/02/18   Bed Mobility PT LTG, Outcome goal met     Goal: Transfer Training Goal 1 LTG- PT  Outcome: Outcome(s) achieved Date Met: 03/02/18 03/02/18 1550   Transfer Training PT LTG   Transfer Training PT LTG, Date Goal Reviewed 03/02/18   Transfer Training PT LTG, Outcome goal met     Goal: Gait Training Goal LTG- PT  Outcome: Unable to achieve outcome(s) by discharge Date Met: 03/02/18 03/02/18 1550   Gait Training PT LTG   Gait Training Goal PT LTG, Date Goal Reviewed 03/02/18   Gait Training Goal PT LTG, Outcome goal not met   Gait Training Goal PT LTG, Reason Goal Not Met discharged from facility

## 2018-03-02 NOTE — PROGRESS NOTES
Continued Stay Note   [facility]     Patient Name: Vikas Cruz  MRN: [id number]  Today's Date: 3/2/2018    Admit Date: 2/27/2018          Discharge Plan       03/02/18 0920    Case Management/Social Work Plan    Plan [facility]    Final Note    Final Note Patient is discharged home today with orders for home health care. SW spoke to patient in room re this and he does agree to home health as APRN recommended to him. Patient states he has previously had [facility] and would like to use [facility] again at discharge. FRANCINE called Roselyn Talat with [facility] to notify her of referral. Printed referral provided to Roselyn on site. FRANCINE noted order for walker in Epic but patient already has a walker at home, SW confirmed with patient in room.               Discharge Codes       03/02/18 0919    Discharge Codes    Discharge Codes 86  R- To home health [facility]        Expected Discharge Date and Time     Expected Discharge Date Expected Discharge Time    Mar 2, 2018             SHARAD Sanders

## 2018-03-02 NOTE — THERAPY TREATMENT NOTE
Acute Care - Physical Therapy Treatment Note  [facility]     Patient Name: Vikas Cruz  : 1950  MRN: [id number]  Today's Date: 3/2/2018  Onset of Illness/Injury or Date of Surgery Date: 18  Date of Referral to PT: 18  Referring Physician: Dr. Donis    Admit Date: 2018    Visit Dx:    ICD-10-CM ICD-9-CM   1. Odontoid fracture with type II morphology S12.110A 805.02   2. Impaired mobility Z74.09 799.89   3. Impaired mobility and ADLs Z74.09 799.89     Patient Active Problem List   Diagnosis   • Odontoid fracture with type II morphology               Adult Rehabilitation Note       18 1032 18 1456 18 1430    Rehab Assessment/Intervention    Discipline physical therapy assistant  -KJ physical therapy assistant  -KJ occupational therapy assistant  -TS    Document Type therapy note (daily note)  -KJ therapy note (daily note)  -KJ therapy note (daily note)  -TS    Subjective Information agree to therapy  -KJ agree to therapy  -KJ agree to therapy;no complaints  -TS    Patient Effort, Rehab Treatment good  -KJ good  -KJ good  -TS    Precautions/Limitations brace on when up;fall precautions;spinal precautions  -KJ brace on when up;fall precautions;spinal precautions  -KJ brace on when up;fall precautions;spinal precautions   aspen collar  -TS    Recorded by [KJ] Cristela Chan, PTA [KJ] Cristela Chan, PTA [TS] DUANE GoodmanA/L    Pain Assessment    Pain Assessment No/denies pain  -KJ No/denies pain  -KJ No/denies pain  -TS    Recorded by [KJ] Cristela Chan, PTA [KJ] Cristela Chan, PTA [TS] DUANE GoodmanA/L    Cognitive Assessment/Intervention    Personal Safety Interventions   fall prevention program maintained;gait belt;nonskid shoes/slippers when out of bed  -TS    Recorded by   [TS] BARRY Goodman/L    Bed Mobility, Assessment/Treatment    Bed Mobility, Assistive Device   head of bed elevated  -TS    Bed Mob, Supine to Sit, [place]  independent  -KJ  minimum assist (75% patient effort)  -TS    Bed Mob, Sit to Supine, [place] independent  -KJ independent  -KJ     Recorded by [KJ] Cristela Chan PTA [KJ] Cristela Chan, DOUGLAS [TS] BARRY Goodman/L    Transfer Assessment/Treatment    Transfers, Sit-Stand [place] independent  -KJ conditional independence  -KJ conditional independence  -TS    Transfers, Stand-Sit [place] independent  -KJ conditional independence  -KJ conditional independence  -TS    Transfer, Comment adjusted aspen brace  -KJ      Recorded by [KJ] Cristela Chan PTA [KJ] Cristela Chan PTA [TS] BARRY Goodman/L    Gait Assessment/Treatment    Gait, [place] Level  verbal cues required;supervision required  -KJ     Gait, Assistive Device  rolling walker  -KJ     Gait, Distance (Feet)  300  -KJ     Recorded by  [KJ] Cristela Chan PTA     Functional Mobility    Functional Mobility- Ind. Level   contact guard assist  -TS    Functional Mobility- Device   --   HHA  -TS    Functional Mobility- Comment   in room  -TS    Recorded by   [TS] BARRY Goodman/L    Upper Body Dressing Assessment/Training    UB Dressing Assess/Train, Clothing Type   donning:;doffing:   aspen collar for re adjustment  -TS    UB Dressing Assess/Train, Position   sitting  -TS    UB Dressing Assess/Train, [place]   maximum assist (25% patient effort)  -TS    Recorded by   [TS] BARRY Goodman/L    Lower Body Dressing Assessment/Training    LB Dressing Assess/Train, Clothing Type   donning:;doffing:;pants  -TS    LB Dressing Assess/Train, Position   sitting;standing  -TS    LB Dressing Assess/Train, [place]   moderate assist (50% patient effort)  -TS    Recorded by   [TS] BARRY Goodman/L    Toileting Assessment/Training    Toileting Assess/Train, Assistive Device   urinal  -TS    Toileting Assess/Train, Position   standing  -TS    Toileting Assess/Train, Indepen Level   contact guard  "assist  -TS    Recorded by   [TS] JEN Goodman    Balance Skills Training    Sitting-Level of Assistance Independent  -KJ      Sitting-Balance Support Feet supported  -KJ      Standing-Level of Assistance Independent  -KJ      Static Standing Balance Support assistive device  -KJ      Recorded by [KJ] Cristela Chan PTA      Orthotics Prosthetics    Additional Documentation Orthosis Location (Group)  -KJ      Recorded by [KJ] Cristela Chan PTA      Orthosis Location    Orthosis Location/Type neck/back  -KJ      Orthosis, Neck/Back cervical collar  -KJ      Recorded by [KJ] Cristela Chan PTA      Orthosis Management/Training    Orthosis Indications immobilize, protect/position healing structures  -KJ      Orthosis Skills Training doffing orthosis;donning orthosis;recognizing skin issues related to orthosis  -KJ  doffing orthosis;donning orthosis;clothing management related to orthosis;purpose/goals of orthosis  -TS    Orthosis Wear Schedule wear full time  -KJ      Recorded by [KJ] Cristela Chan PTA  [TS] BARRY Goodman/L    Positioning and Restraints    Pre-Treatment Position in bed  -KJ sitting in chair/recliner  -KJ in bed  -TS    Post Treatment Position bed  -KJ bed  -KJ chair  -TS    In Chair   sitting;call light within reach;encouraged to call for assist;with PT  -TS    Recorded by [KJ] Cristela Chan PTA [KJ] Cristela Chan PTA [TS] JEN Goodman      03/01/18 1116 03/01/18 1024 03/01/18 1001    Rehab Assessment/Intervention    Discipline physical therapy assistant  -KJ occupational therapy assistant  -TS --  -KJ    Document Type therapy note (daily note)  -KJ --  -TS --  -KJ    Subjective Information agree to therapy;complains of;dizziness  -KJ      Treatment Not Performed, Comment  pt requested to come back later due to \"so much pain from not being able to pee\" Pt RN placing catheter this AM  -TS     Precautions/Limitations fall precautions;brace on when " up;spinal precautions  -KJ      Recorded by [KJ] Cristela Chan PTA [TS] BARRY Goodman/GUERLINE [KJ] Cristela Chan PTA    Pain Assessment    Pain Assessment 0-10  -KJ      Pain Score 6  -KJ      Post Pain Score 7  -KJ      Pain Type Acute pain;Surgical pain  -KJ      Pain Location Neck  -KJ      Pain Orientation Posterior  -KJ      Pain Frequency Constant/continuous  -KJ      Pain Intervention(s) Ambulation/increased activity  -KJ      Response to Interventions tolerated  -KJ      Recorded by [KJ] Cristela Chan PTA      Bed Mobility, Assessment/Treatment    Bed Mob, Supine to Sit, [place] verbal cues required;minimum assist (75% patient effort)  -KJ      Bed Mob, Sit to Supine, [place] independent  -KJ      Bed Mobility, Safety Issues decreased use of arms for pushing/pulling  -KJ      Bed Mobility, Impairments strength decreased  -KJ      Bed Mobility, Comment vc's for log rolling  -KJ      Recorded by [KJ] Cristela Chan PTA      Transfer Assessment/Treatment    Transfers, Sit-Stand [place] conditional independence  -KJ      Transfers, Stand-Sit [place] conditional independence  -KJ      Transfers, Sit-Stand-Sit, Assist Device rolling walker  -KJ      Transfer, Safety Issues step length decreased  -KJ      Transfer, Impairments strength decreased  -KJ      Recorded by [KJ] Cristela Chan PTA      Gait Assessment/Treatment    Gait, [place] Level verbal cues required;stand by assist  -KJ      Gait, Assistive Device rolling walker  -KJ      Gait, Distance (Feet) 300   standing rest 300' back to room  -KJ      Gait, Gait Deviations bilateral:;forward flexed posture  -KJ      Gait, Safety Issues sequencing ability decreased  -KJ      Gait, Impairments strength decreased  -KJ      Recorded by [KJ] Cristela Chan PTA      Stairs Assessment/Treatment    Number of Stairs 4  -KJ      Stairs, Handrail Location left side (ascending)  -KJ      Stairs, [place] Level independent  -KJ       Stairs, Technique Used step over step (ascending);step over step (descending)  -KJ      Recorded by [KJ] Cristela Chan PTA      Orthotics Prosthetics    Additional Documentation Orthosis Location (Group)  -KJ      Recorded by [KJ] Cristela Chan PTA      Orthosis Location    Orthosis Location/Type neck/back  -KJ      Orthosis, Neck/Back cervical collar  -KJ      Recorded by [KJ] Cristela Chan PTA      Orthosis Management/Training    Orthosis Indications immobilize, protect/position healing structures  -KJ      Orthosis Skills Training doffing orthosis;donning orthosis  -KJ      Recorded by [KJ] Cristela Chan PTA      Positioning and Restraints    Pre-Treatment Position in bed  -KJ      Post Treatment Position bed  -KJ      Recorded by [KJ] Cristela Chan PTA        02/28/18 7537          Rehab Assessment/Intervention    Discipline physical therapy assistant  -KJ      Document Type therapy note (daily note)  -KJ      Subjective Information agree to therapy  -KJ      Precautions/Limitations fall precautions;spinal precautions   aspen collar  -KJ      Recorded by [KJ] Cristela Chan PTA      Pain Assessment    Pain Assessment 0-10  -KJ      Pain Score 7  -KJ      Post Pain Score 8  -KJ      Pain Type Acute pain;Surgical pain  -KJ      Pain Location Neck  -KJ      Pain Orientation Posterior  -KJ      Pain Frequency Constant/continuous  -KJ      Pain Intervention(s) Ambulation/increased activity  -KJ      Response to Interventions tolerated  -KJ      Recorded by [KJ] Cristela Chan PTA      Bed Mobility, Assessment/Treatment    Bed Mob, Supine to Sit, [place] verbal cues required;minimum assist (75% patient effort)  -KJ      Bed Mob, Sit to Supine, [place] supervision required  -KJ      Bed Mobility, Safety Issues decreased use of arms for pushing/pulling;decreased use of legs for bridging/pushing  -KJ      Bed Mobility, Impairments strength decreased  -KJ      Bed Mobility, Comment vc's for log  rolling  -KJ      Recorded by [KJ] Cristela Chan PTA      Transfer Assessment/Treatment    Transfers, Sit-Stand [place] verbal cues required;contact guard assist  -KJ      Transfers, Stand-Sit [place] verbal cues required;contact guard assist  -KJ      Transfers, Sit-Stand-Sit, Assist Device rolling walker  -KJ      Transfer, Safety Issues step length decreased  -KJ      Transfer, Impairments strength decreased  -KJ      Recorded by [KJ] Cristela Chan PTA      Gait Assessment/Treatment    Gait, [place] Level verbal cues required;contact guard assist  -KJ      Gait, Assistive Device rolling walker  -KJ      Gait, Distance (Feet) 75   x 3 standing rests  -KJ      Gait, Gait Deviations bilateral:;katerina decreased;forward flexed posture;step length decreased  -KJ      Gait, Impairments strength decreased  -KJ      Recorded by [KJ] Cristela Chan PTA      Orthotics Prosthetics    Additional Documentation Orthosis Location (Group)  -KJ      Recorded by [KJ] Cristela Chan PTA      Orthosis Location    Orthosis Location/Type neck/back  -KJ      Orthosis, Neck/Back cervical collar  -KJ      Recorded by [KJ] Cristela hCan PTA      Orthosis Management/Training    Orthosis Indications immobilize, protect/position healing structures  -KJ      Orthosis Skills Training activity limitations;doffing orthosis;donning orthosis  -KJ      Recorded by [KJ] Cristeal Chan PTA      Positioning and Restraints    Pre-Treatment Position in bed  -KJ      Recorded by [KJ] Cristela Chan PTA        User Key  (r) = Recorded By, (t) = Taken By, (c) = Cosigned By    Initials Name Effective Dates    KJ Cristela Chan PTA 08/02/16 -     TS JEN Goodman 08/02/16 -                 IP PT Goals       02/28/18 0912          Bed Mobility PT LTG    Bed Mobility PT LTG, Date Established 02/28/18  -PB (r) JM (t) PB (c)      Bed Mobility PT LTG, Time to Achieve by discharge  -PB (r) JM (t) PB (c)      Bed Mobility PT  LTG, Activity Type all bed mobility  -PB (r) JM (t) PB (c)      Bed Mobility PT LTG, [place] Level conditional independence  -PB (r) JM (t) PB (c)      Bed Mobility PT Goal  LTG, Assist Device bed rails  -PB (r) JM (t) PB (c)      Transfer Training PT LTG    Transfer Training PT LTG, Date Established 02/28/18  -PB (r) JM (t) PB (c)      Transfer Training PT LTG, Time to Achieve by discharge  -PB (r) JM (t) PB (c)      Transfer Training PT LTG, [place] Level supervision required  -PB (r) JM (t) PB (c)      Transfer Training PT LTG, Assist Device walker, rolling  -PB (r) JM (t) PB (c)      Gait Training PT LTG    Gait Training Goal PT LTG, Date Established 02/28/18  -PB (r) JM (t) PB (c)      Gait Training Goal PT LTG, Time to Achieve by discharge  -PB (r) JM (t) PB (c)      Gait Training Goal PT LTG, [place] Level supervision required  -PB (r) JM (t) PB (c)      Gait Training Goal PT LTG, Assist Device walker, rolling  -PB (r) JM (t) PB (c)      Gait Training Goal PT LTG, Distance to Achieve 800 feet  -PB (r) JM (t) PB (c)        User Key  (r) = Recorded By, (t) = Taken By, (c) = Cosigned By    Initials Name Provider Type    GAIL López, PT DPT Physical Therapist    MIKO Sanchez, PT Student PT Student          Physical Therapy Education     Title: PT OT SLP Therapies (Active)     Topic: Physical Therapy (Active)     Point: Mobility training (Active)    Learning Progress Summary    Learner Readiness Method Response Comment Documented by Status   Patient Acceptance E NR don/doffing aspen collar, adjusting as needed for comfort KJ 03/02/18 1056 Active    Acceptance E NR log rolling, bed mobility KJ 03/01/18 1141 Active    Acceptance E VU Bed mobility techniques, t/f techniques, plan of care, benefits of activity.  02/28/18 0904 Done               Point: Precautions (Active)    Learning Progress Summary    Learner Readiness Method Response Comment Documented by Status   Patient Acceptance  TB NR don/doffing aspen collar , collar management KJ 03/02/18 1101 Active    Acceptance E VU Cervical collar management, spinal precautions.  02/28/18 0905 Done                      User Key     Initials Effective Dates Name Provider Type Discipline    KJ 08/02/16 -  Cristela Chan, PTA Physical Therapy Assistant PT     01/10/18 -  Tramaine Sanchez, PT Student PT Student PT                    PT Recommendation and Plan  Anticipated Discharge Disposition: home with assist  Planned Therapy Interventions: balance training, bed mobility training, gait training, orthotic fitting/training, patient/family education, strengthening, transfer training, postural re-education  PT Frequency: 2 times/day, per priority policy  Plan of Care Review  Plan Of Care Reviewed With: patient  Progress: progress toward functional goals as expected  Outcome Summary/Follow up Plan: PT tx completed. Pt supine in bed c/o brace hurting left side of neck. I put extra padding and  re-adjusted collar. He seemed to think it felt better. I bed mobility and transfers. Amb in room I with wx. Plan for discharge today.          Outcome Measures       03/01/18 1500 02/28/18 0903 02/28/18 0900    How much help from another person do you currently need...    Turning from your back to your side while in flat bed without using bedrails?   3  -PB (r) JM (t) PB (c)    Moving from lying on back to sitting on the side of a flat bed without bedrails?   3  -PB (r) JM (t) PB (c)    Moving to and from a bed to a chair (including a wheelchair)?   3  -PB (r) JM (t) PB (c)    Standing up from a chair using your arms (e.g., wheelchair, bedside chair)?   3  -PB (r) JM (t) PB (c)    Climbing 3-5 steps with a railing?   3  -PB (r) JM (t) PB (c)    To walk in hospital room?   3  -PB (r) JM (t) PB (c)    AM-PAC 6 Clicks Score   18  -PB (r) JM (t)    How much help from another is currently needed...    Putting on and taking off regular lower body clothing? 2  -TS 2  -AC  (r) MK (t) AC (c)     Bathing (including washing, rinsing, and drying) 3  -TS 2  -AC (r) MK (t) AC (c)     Toileting (which includes using toilet bed pan or urinal) 3  -TS 3  -AC (r) MK (t) AC (c)     Putting on and taking off regular upper body clothing 2  -TS 2  -AC (r) MK (t) AC (c)     Taking care of personal grooming (such as brushing teeth) 3  -TS 3  -AC (r) MK (t) AC (c)     Eating meals 4  -TS 3  -AC (r) MK (t) AC (c)     Score 17  -TS 15  -AC (r) MK (t)     Functional Assessment    Outcome Measure Options AM-PAC 6 Clicks Daily Activity (OT)  -TS AM-PAC 6 Clicks Daily Activity (OT)  -AC (r) MK (t) AC (c) AM-PAC 6 Clicks Basic Mobility (PT)  -PB (r) JM (t) PB (c)      User Key  (r) = Recorded By, (t) = Taken By, (c) = Cosigned By    Initials Name Provider Type    AC Mario Alberto Smith, OTR/L Occupational Therapist    TS Sailaja Parra REDDY/L Occupational Therapy Assistant    GAIL López, PT DPT Physical Therapist    MIKO Sanchez, PT Student PT Student    EZRA Landaverde, OT Student OT Student           Time Calculation:       Therapy Charges for Today     Code Description Service Date Service Provider Modifiers Qty    64671065609 HC GAIT TRAINING EA 15 MIN 3/1/2018 Cristela Chan, PTA GP, KX 2    80453356905 HC GAIT TRAINING EA 15 MIN 3/1/2018 Cristela Chan PTA GP, KX 1          PT G-Codes  Outcome Measure Options: AM-PAC 6 Clicks Daily Activity (OT)  Score: 18  Functional Limitation: Mobility: Walking and moving around  Mobility: Walking and Moving Around Current Status (): At least 40 percent but less than 60 percent impaired, limited or restricted  Mobility: Walking and Moving Around Goal Status (): At least 20 percent but less than 40 percent impaired, limited or restricted    Cristela Chan PTA  3/2/2018

## 2018-03-02 NOTE — THERAPY DISCHARGE NOTE
Acute Care - Physical Therapy Discharge Summary  [facility]       Patient Name: Vikas Cruz  : 1950  MRN: [id number]    Today's Date: 3/2/2018  Onset of Illness/Injury or Date of Surgery Date: 18    Date of Referral to PT: 18  Referring Physician: Dr. Donis      Admit Date: 2018      PT Recommendation and Plan    Visit Dx:    ICD-10-CM ICD-9-CM   1. Odontoid fracture with type II morphology S12.110A 805.02   2. Impaired mobility Z74.09 799.89   3. Impaired mobility and ADLs Z74.09 799.89             Outcome Measures       18 1500 18 0903 18 0900    How much help from another person do you currently need...    Turning from your back to your side while in flat bed without using bedrails?   3  -PB (r) JM (t) PB (c)    Moving from lying on back to sitting on the side of a flat bed without bedrails?   3  -PB (r) JM (t) PB (c)    Moving to and from a bed to a chair (including a wheelchair)?   3  -PB (r) JM (t) PB (c)    Standing up from a chair using your arms (e.g., wheelchair, bedside chair)?   3  -PB (r) JM (t) PB (c)    Climbing 3-5 steps with a railing?   3  -PB (r) JM (t) PB (c)    To walk in hospital room?   3  -PB (r) JM (t) PB (c)    AM-PAC 6 Clicks Score   18  -PB (r) JM (t)    How much help from another is currently needed...    Putting on and taking off regular lower body clothing? 2  -TS 2  -AC (r) MK (t) AC (c)     Bathing (including washing, rinsing, and drying) 3  -TS 2  -AC (r) MK (t) AC (c)     Toileting (which includes using toilet bed pan or urinal) 3  -TS 3  -AC (r) MK (t) AC (c)     Putting on and taking off regular upper body clothing 2  -TS 2  -AC (r) MK (t) AC (c)     Taking care of personal grooming (such as brushing teeth) 3  -TS 3  -AC (r) MK (t) AC (c)     Eating meals 4  -TS 3  -AC (r) MK (t) AC (c)     Score 17  -TS 15  -AC (r) MK (t)     Functional Assessment    Outcome Measure Options AM-PAC 6 Clicks Daily Activity (OT)  -TS AM-PAC 6 Clicks Daily  Activity (OT)  -AC (r) MK (t) AC (c) AM-PAC 6 Clicks Basic Mobility (PT)  -PB (r) JM (t) PB (c)      User Key  (r) = Recorded By, (t) = Taken By, (c) = Cosigned By    Initials Name Provider Type    AC Mario Alberto Smith, OTR/L Occupational Therapist    CLARA Parra, REDDY/L Occupational Therapy Assistant    PB Jorge López, PT DPT Physical Therapist    MIKO Sanchez, PT Student PT Student    MK Ariana Landaverde, OT Student OT Student                PT Charges       03/02/18 1032          Time Calculation    Start Time 1032  -KJ      Stop Time 1056  -KJ      Time Calculation (min) 24 min  -KJ      PT Received On 03/02/18  -KJ      PT Goal Re-Cert Due Date 03/10/18  -KJ      Time Calculation- PT    Total Timed Code Minutes- PT 24 minute(s)  -KJ        User Key  (r) = Recorded By, (t) = Taken By, (c) = Cosigned By    Initials Name Provider Type    KATARZYNA Chan, PTA Physical Therapy Assistant                  IP PT Goals       03/02/18 1550 02/28/18 0912       Bed Mobility PT LTG    Bed Mobility PT LTG, Date Established  02/28/18  -PB (r) JM (t) PB (c)     Bed Mobility PT LTG, Time to Achieve  by discharge  -PB (r) JM (t) PB (c)     Bed Mobility PT LTG, Activity Type  all bed mobility  -PB (r) JM (t) PB (c)     Bed Mobility PT LTG, [place] Level  conditional independence  -PB (r) JM (t) PB (c)     Bed Mobility PT Goal  LTG, Assist Device  bed rails  -PB (r) JM (t) PB (c)     Bed Mobility PT LTG, Date Goal Reviewed 03/02/18  -CW      Bed Mobility PT LTG, Outcome goal met  -CW      Transfer Training PT LTG    Transfer Training PT LTG, Date Established  02/28/18  -PB (r) JM (t) PB (c)     Transfer Training PT LTG, Time to Achieve  by discharge  -PB (r) JM (t) PB (c)     Transfer Training PT LTG, [place] Level  supervision required  -PB (r) JM (t) PB (c)     Transfer Training PT LTG, Assist Device  walker, rolling  -PB (r) JM (t) GAIL (c)     Transfer Training PT  LTG, Date Goal Reviewed 03/02/18   -CW      Transfer Training PT LTG, Outcome goal met  -CW      Gait Training PT LTG    Gait Training Goal PT LTG, Date Established  02/28/18  -PB (r) JM (t) PB (c)     Gait Training Goal PT LTG, Time to Achieve  by discharge  -PB (r) MIKO (t) PB (c)     Gait Training Goal PT LTG, [place] Level  supervision required  -PB (r) JM (t) PB (c)     Gait Training Goal PT LTG, Assist Device  walker, rolling  -PB (r) JM (t) PB (c)     Gait Training Goal PT LTG, Distance to Achieve  800 feet  -PB (r) JM (t) PB (c)     Gait Training Goal PT LTG, Date Goal Reviewed 03/02/18  -CW      Gait Training Goal PT LTG, Outcome goal not met  -CW      Gait Training Goal PT LTG, Reason Goal Not Met discharged from facility  -CW        User Key  (r) = Recorded By, (t) = Taken By, (c) = Cosigned By    Initials Name Provider Type    MEDINA Perez PTA Physical Therapy Assistant    GAIL López, PT DPT Physical Therapist    MIKO Sanchez, PT Student PT Student              PT Discharge Summary  Reason for Discharge: Discharge from facility  Outcomes Achieved: Refer to plan of care for updates on goals achieved  Discharge Destination: Home      Tracie Perez PTA   3/2/2018

## 2018-03-02 NOTE — PLAN OF CARE
Problem: Patient Care Overview (Adult)  Goal: Plan of Care Review  Outcome: Outcome(s) achieved Date Met: 03/02/18 03/02/18 1153   Outcome Evaluation   Outcome Summary/Follow up Plan Patient is being discharged and will have [facility] following him at home.      Goal: Adult Individualization and Mutuality  Outcome: Outcome(s) achieved Date Met: 03/02/18    Goal: Discharge Needs Assessment  Outcome: Outcome(s) achieved Date Met: 03/02/18      Problem: Fall Risk (Adult)  Goal: Absence of Falls  Outcome: Outcome(s) achieved Date Met: 03/02/18      Problem: Laminectomy/Foraminotomy/Discectomy (Adult)  Goal: Signs and Symptoms of Listed Potential Problems Will be Absent or Manageable (Laminectomy/Foraminotomy/Discectomy)  Outcome: Outcome(s) achieved Date Met: 03/02/18

## 2018-03-05 ENCOUNTER — TELEPHONE (OUTPATIENT)
Dept: NEUROSURGERY | Facility: CLINIC | Age: 68
End: 2018-03-05

## 2018-03-05 NOTE — TELEPHONE ENCOUNTER
Rafa just called to let us know that they have picked him up to start his OT.  She mentioned that his BP was running low - I instructed her to contact his PCP regarding BP - she agreed.    luis gifford CMA

## 2018-03-06 ENCOUNTER — APPOINTMENT (OUTPATIENT)
Dept: GENERAL RADIOLOGY | Facility: HOSPITAL | Age: 68
End: 2018-03-06

## 2018-03-06 ENCOUNTER — HOSPITAL ENCOUNTER (INPATIENT)
Facility: HOSPITAL | Age: 68
LOS: 1 days | Discharge: HOME HEALTH | End: 2018-03-07
Attending: FAMILY MEDICINE | Admitting: FAMILY MEDICINE

## 2018-03-06 DIAGNOSIS — I47.1 SVT (SUPRAVENTRICULAR TACHYCARDIA) (HCC): Primary | ICD-10-CM

## 2018-03-06 PROBLEM — I95.2 HYPOTENSION DUE TO DRUGS: Status: ACTIVE | Noted: 2018-03-06

## 2018-03-06 PROBLEM — I48.0 PAF (PAROXYSMAL ATRIAL FIBRILLATION): Status: ACTIVE | Noted: 2018-03-06

## 2018-03-06 PROBLEM — D64.9 ANEMIA: Status: ACTIVE | Noted: 2018-03-06

## 2018-03-06 PROBLEM — I47.10 SVT (SUPRAVENTRICULAR TACHYCARDIA): Status: ACTIVE | Noted: 2018-03-06

## 2018-03-06 PROBLEM — N18.30 CHRONIC KIDNEY DISEASE, STAGE III (MODERATE): Status: ACTIVE | Noted: 2018-03-06

## 2018-03-06 LAB
ABO GROUP BLD: NORMAL
ALBUMIN SERPL-MCNC: 2.7 G/DL (ref 3.5–5)
ALBUMIN/GLOB SERPL: 1 G/DL (ref 1.1–2.5)
ALP SERPL-CCNC: 87 U/L (ref 24–120)
ALT SERPL W P-5'-P-CCNC: 28 U/L (ref 0–54)
ANION GAP SERPL CALCULATED.3IONS-SCNC: 10 MMOL/L (ref 4–13)
ANION GAP SERPL CALCULATED.3IONS-SCNC: 7 MMOL/L (ref 4–13)
APTT PPP: 36.5 SECONDS (ref 24.1–34.8)
AST SERPL-CCNC: 29 U/L (ref 7–45)
BASOPHILS # BLD AUTO: 0.01 10*3/MM3 (ref 0–0.2)
BASOPHILS # BLD AUTO: 0.01 10*3/MM3 (ref 0–0.2)
BASOPHILS NFR BLD AUTO: 0.2 % (ref 0–2)
BASOPHILS NFR BLD AUTO: 0.2 % (ref 0–2)
BILIRUB SERPL-MCNC: 0.4 MG/DL (ref 0.1–1)
BILIRUB UR QL STRIP: NEGATIVE
BLD GP AB SCN SERPL QL: NEGATIVE
BUN BLD-MCNC: 32 MG/DL (ref 5–21)
BUN BLD-MCNC: 33 MG/DL (ref 5–21)
BUN/CREAT SERPL: 20.3 (ref 7–25)
BUN/CREAT SERPL: 20.9 (ref 7–25)
CALCIUM SPEC-SCNC: 7.8 MG/DL (ref 8.4–10.4)
CALCIUM SPEC-SCNC: 7.9 MG/DL (ref 8.4–10.4)
CHLORIDE SERPL-SCNC: 95 MMOL/L (ref 98–110)
CHLORIDE SERPL-SCNC: 96 MMOL/L (ref 98–110)
CLARITY UR: CLEAR
CO2 SERPL-SCNC: 29 MMOL/L (ref 24–31)
CO2 SERPL-SCNC: 32 MMOL/L (ref 24–31)
COLOR UR: YELLOW
CREAT BLD-MCNC: 1.58 MG/DL (ref 0.5–1.4)
CREAT BLD-MCNC: 1.58 MG/DL (ref 0.5–1.4)
D DIMER PPP FEU-MCNC: 2.36 MG/L (FEU) (ref 0–0.5)
DEPRECATED RDW RBC AUTO: 47 FL (ref 40–54)
DEPRECATED RDW RBC AUTO: 47.5 FL (ref 40–54)
EOSINOPHIL # BLD AUTO: 0.07 10*3/MM3 (ref 0–0.7)
EOSINOPHIL # BLD AUTO: 0.08 10*3/MM3 (ref 0–0.7)
EOSINOPHIL NFR BLD AUTO: 1.5 % (ref 0–4)
EOSINOPHIL NFR BLD AUTO: 1.8 % (ref 0–4)
ERYTHROCYTE [DISTWIDTH] IN BLOOD BY AUTOMATED COUNT: 14.6 % (ref 12–15)
ERYTHROCYTE [DISTWIDTH] IN BLOOD BY AUTOMATED COUNT: 14.6 % (ref 12–15)
GFR SERPL CREATININE-BSD FRML MDRD: 44 ML/MIN/1.73
GFR SERPL CREATININE-BSD FRML MDRD: 44 ML/MIN/1.73
GLOBULIN UR ELPH-MCNC: 2.8 GM/DL
GLUCOSE BLD-MCNC: 124 MG/DL (ref 70–100)
GLUCOSE BLD-MCNC: 152 MG/DL (ref 70–100)
GLUCOSE UR STRIP-MCNC: NEGATIVE MG/DL
HCT VFR BLD AUTO: 22.7 % (ref 40–52)
HCT VFR BLD AUTO: 25.1 % (ref 40–52)
HEMOCCULT STL QL: NEGATIVE
HGB BLD-MCNC: 7.9 G/DL (ref 14–18)
HGB BLD-MCNC: 8.6 G/DL (ref 14–18)
HGB UR QL STRIP.AUTO: NEGATIVE
HOLD SPECIMEN: NORMAL
HOLD SPECIMEN: NORMAL
IMM GRANULOCYTES # BLD: 0.02 10*3/MM3 (ref 0–0.03)
IMM GRANULOCYTES # BLD: 0.04 10*3/MM3 (ref 0–0.03)
IMM GRANULOCYTES NFR BLD: 0.5 % (ref 0–5)
IMM GRANULOCYTES NFR BLD: 0.8 % (ref 0–5)
INR PPP: 1.2 (ref 0.91–1.09)
KETONES UR QL STRIP: NEGATIVE
LEUKOCYTE ESTERASE UR QL STRIP.AUTO: NEGATIVE
LIPASE SERPL-CCNC: 47 U/L (ref 23–203)
LYMPHOCYTES # BLD AUTO: 0.79 10*3/MM3 (ref 0.72–4.86)
LYMPHOCYTES # BLD AUTO: 0.94 10*3/MM3 (ref 0.72–4.86)
LYMPHOCYTES NFR BLD AUTO: 16.6 % (ref 15–45)
LYMPHOCYTES NFR BLD AUTO: 21.7 % (ref 15–45)
MAGNESIUM SERPL-MCNC: 1.8 MG/DL (ref 1.4–2.2)
MCH RBC QN AUTO: 31 PG (ref 28–32)
MCH RBC QN AUTO: 31.1 PG (ref 28–32)
MCHC RBC AUTO-ENTMCNC: 34.3 G/DL (ref 33–36)
MCHC RBC AUTO-ENTMCNC: 34.8 G/DL (ref 33–36)
MCV RBC AUTO: 89.4 FL (ref 82–95)
MCV RBC AUTO: 90.6 FL (ref 82–95)
MONOCYTES # BLD AUTO: 0.27 10*3/MM3 (ref 0.19–1.3)
MONOCYTES # BLD AUTO: 0.36 10*3/MM3 (ref 0.19–1.3)
MONOCYTES NFR BLD AUTO: 5.7 % (ref 4–12)
MONOCYTES NFR BLD AUTO: 8.3 % (ref 4–12)
NEUTROPHILS # BLD AUTO: 2.92 10*3/MM3 (ref 1.87–8.4)
NEUTROPHILS # BLD AUTO: 3.58 10*3/MM3 (ref 1.87–8.4)
NEUTROPHILS NFR BLD AUTO: 67.5 % (ref 39–78)
NEUTROPHILS NFR BLD AUTO: 75.2 % (ref 39–78)
NITRITE UR QL STRIP: NEGATIVE
NRBC BLD MANUAL-RTO: 0 /100 WBC (ref 0–0)
NRBC BLD MANUAL-RTO: 0 /100 WBC (ref 0–0)
NT-PROBNP SERPL-MCNC: 1560 PG/ML (ref 0–900)
PH UR STRIP.AUTO: 7 [PH] (ref 5–8)
PLATELET # BLD AUTO: 190 10*3/MM3 (ref 130–400)
PLATELET # BLD AUTO: 200 10*3/MM3 (ref 130–400)
PMV BLD AUTO: 9.5 FL (ref 6–12)
PMV BLD AUTO: 9.5 FL (ref 6–12)
POTASSIUM BLD-SCNC: 3.6 MMOL/L (ref 3.5–5.3)
POTASSIUM BLD-SCNC: 4.1 MMOL/L (ref 3.5–5.3)
PROT SERPL-MCNC: 5.5 G/DL (ref 6.3–8.7)
PROT UR QL STRIP: NEGATIVE
PROTHROMBIN TIME: 15.6 SECONDS (ref 11.9–14.6)
RBC # BLD AUTO: 2.54 10*6/MM3 (ref 4.8–5.9)
RBC # BLD AUTO: 2.77 10*6/MM3 (ref 4.8–5.9)
RH BLD: POSITIVE
SODIUM BLD-SCNC: 134 MMOL/L (ref 135–145)
SODIUM BLD-SCNC: 135 MMOL/L (ref 135–145)
SP GR UR STRIP: 1.01 (ref 1–1.03)
T4 FREE SERPL-MCNC: 1.81 NG/DL (ref 0.78–2.19)
TROPONIN I SERPL-MCNC: 0.02 NG/ML (ref 0–0.03)
TSH SERPL DL<=0.05 MIU/L-ACNC: 1.31 MIU/ML (ref 0.47–4.68)
UROBILINOGEN UR QL STRIP: NORMAL
WBC NRBC COR # BLD: 4.33 10*3/MM3 (ref 4.8–10.8)
WBC NRBC COR # BLD: 4.76 10*3/MM3 (ref 4.8–10.8)
WHOLE BLOOD HOLD SPECIMEN: NORMAL
WHOLE BLOOD HOLD SPECIMEN: NORMAL

## 2018-03-06 PROCEDURE — 86900 BLOOD TYPING SEROLOGIC ABO: CPT | Performed by: FAMILY MEDICINE

## 2018-03-06 PROCEDURE — 93005 ELECTROCARDIOGRAM TRACING: CPT

## 2018-03-06 PROCEDURE — 25010000002 ADENOSINE PER 6 MG: Performed by: FAMILY MEDICINE

## 2018-03-06 PROCEDURE — 84439 ASSAY OF FREE THYROXINE: CPT | Performed by: FAMILY MEDICINE

## 2018-03-06 PROCEDURE — 85610 PROTHROMBIN TIME: CPT | Performed by: FAMILY MEDICINE

## 2018-03-06 PROCEDURE — 30233N1 TRANSFUSION OF NONAUTOLOGOUS RED BLOOD CELLS INTO PERIPHERAL VEIN, PERCUTANEOUS APPROACH: ICD-10-PCS | Performed by: FAMILY MEDICINE

## 2018-03-06 PROCEDURE — 36430 TRANSFUSION BLD/BLD COMPNT: CPT

## 2018-03-06 PROCEDURE — P9016 RBC LEUKOCYTES REDUCED: HCPCS

## 2018-03-06 PROCEDURE — 93010 ELECTROCARDIOGRAM REPORT: CPT | Performed by: INTERNAL MEDICINE

## 2018-03-06 PROCEDURE — 84443 ASSAY THYROID STIM HORMONE: CPT | Performed by: FAMILY MEDICINE

## 2018-03-06 PROCEDURE — 83690 ASSAY OF LIPASE: CPT | Performed by: FAMILY MEDICINE

## 2018-03-06 PROCEDURE — 86901 BLOOD TYPING SEROLOGIC RH(D): CPT | Performed by: FAMILY MEDICINE

## 2018-03-06 PROCEDURE — 83880 ASSAY OF NATRIURETIC PEPTIDE: CPT | Performed by: FAMILY MEDICINE

## 2018-03-06 PROCEDURE — 86900 BLOOD TYPING SEROLOGIC ABO: CPT

## 2018-03-06 PROCEDURE — 86850 RBC ANTIBODY SCREEN: CPT | Performed by: FAMILY MEDICINE

## 2018-03-06 PROCEDURE — 82272 OCCULT BLD FECES 1-3 TESTS: CPT | Performed by: FAMILY MEDICINE

## 2018-03-06 PROCEDURE — 85379 FIBRIN DEGRADATION QUANT: CPT | Performed by: FAMILY MEDICINE

## 2018-03-06 PROCEDURE — 85025 COMPLETE CBC W/AUTO DIFF WBC: CPT | Performed by: FAMILY MEDICINE

## 2018-03-06 PROCEDURE — 25010000002 LORAZEPAM PER 2 MG

## 2018-03-06 PROCEDURE — 83735 ASSAY OF MAGNESIUM: CPT | Performed by: FAMILY MEDICINE

## 2018-03-06 PROCEDURE — 71045 X-RAY EXAM CHEST 1 VIEW: CPT

## 2018-03-06 PROCEDURE — 81003 URINALYSIS AUTO W/O SCOPE: CPT | Performed by: FAMILY MEDICINE

## 2018-03-06 PROCEDURE — 93005 ELECTROCARDIOGRAM TRACING: CPT | Performed by: FAMILY MEDICINE

## 2018-03-06 PROCEDURE — 85730 THROMBOPLASTIN TIME PARTIAL: CPT | Performed by: FAMILY MEDICINE

## 2018-03-06 PROCEDURE — 80053 COMPREHEN METABOLIC PANEL: CPT | Performed by: FAMILY MEDICINE

## 2018-03-06 PROCEDURE — 84484 ASSAY OF TROPONIN QUANT: CPT | Performed by: FAMILY MEDICINE

## 2018-03-06 PROCEDURE — 25010000002 ADENOSINE PER 6 MG

## 2018-03-06 PROCEDURE — 99285 EMERGENCY DEPT VISIT HI MDM: CPT

## 2018-03-06 PROCEDURE — 86923 COMPATIBILITY TEST ELECTRIC: CPT

## 2018-03-06 RX ORDER — LORAZEPAM 2 MG/ML
0.5 INJECTION INTRAMUSCULAR ONCE
Status: COMPLETED | OUTPATIENT
Start: 2018-03-06 | End: 2018-03-06

## 2018-03-06 RX ORDER — GABAPENTIN 300 MG/1
900 CAPSULE ORAL NIGHTLY
Status: DISCONTINUED | OUTPATIENT
Start: 2018-03-06 | End: 2018-03-08 | Stop reason: HOSPADM

## 2018-03-06 RX ORDER — ADENOSINE 3 MG/ML
6 INJECTION, SOLUTION INTRAVENOUS ONCE
Status: DISCONTINUED | OUTPATIENT
Start: 2018-03-06 | End: 2018-03-06

## 2018-03-06 RX ORDER — ADENOSINE 3 MG/ML
6 INJECTION, SOLUTION INTRAVENOUS ONCE
Status: COMPLETED | OUTPATIENT
Start: 2018-03-06 | End: 2018-03-06

## 2018-03-06 RX ORDER — ACETAMINOPHEN 325 MG/1
650 TABLET ORAL EVERY 4 HOURS PRN
Status: DISCONTINUED | OUTPATIENT
Start: 2018-03-06 | End: 2018-03-08 | Stop reason: HOSPADM

## 2018-03-06 RX ORDER — ALPRAZOLAM 0.5 MG/1
1 TABLET ORAL 3 TIMES DAILY PRN
Status: DISCONTINUED | OUTPATIENT
Start: 2018-03-06 | End: 2018-03-08 | Stop reason: HOSPADM

## 2018-03-06 RX ORDER — SODIUM CHLORIDE 0.9 % (FLUSH) 0.9 %
1-10 SYRINGE (ML) INJECTION AS NEEDED
Status: DISCONTINUED | OUTPATIENT
Start: 2018-03-06 | End: 2018-03-08 | Stop reason: HOSPADM

## 2018-03-06 RX ORDER — ALUMINA, MAGNESIA, AND SIMETHICONE 2400; 2400; 240 MG/30ML; MG/30ML; MG/30ML
15 SUSPENSION ORAL EVERY 6 HOURS PRN
Status: DISCONTINUED | OUTPATIENT
Start: 2018-03-06 | End: 2018-03-08 | Stop reason: HOSPADM

## 2018-03-06 RX ORDER — LORAZEPAM 2 MG/ML
INJECTION INTRAMUSCULAR
Status: COMPLETED
Start: 2018-03-06 | End: 2018-03-06

## 2018-03-06 RX ORDER — ATORVASTATIN CALCIUM 40 MG/1
40 TABLET, FILM COATED ORAL NIGHTLY
Status: DISCONTINUED | OUTPATIENT
Start: 2018-03-06 | End: 2018-03-08 | Stop reason: HOSPADM

## 2018-03-06 RX ORDER — ADENOSINE 3 MG/ML
12 INJECTION, SOLUTION INTRAVENOUS ONCE
Status: COMPLETED | OUTPATIENT
Start: 2018-03-06 | End: 2018-03-06

## 2018-03-06 RX ORDER — OXYCODONE HYDROCHLORIDE 5 MG/1
20 TABLET ORAL EVERY 8 HOURS PRN
Status: DISCONTINUED | OUTPATIENT
Start: 2018-03-06 | End: 2018-03-08 | Stop reason: HOSPADM

## 2018-03-06 RX ORDER — TAMSULOSIN HYDROCHLORIDE 0.4 MG/1
0.4 CAPSULE ORAL DAILY
Status: DISCONTINUED | OUTPATIENT
Start: 2018-03-06 | End: 2018-03-08 | Stop reason: HOSPADM

## 2018-03-06 RX ORDER — ZOLPIDEM TARTRATE 5 MG/1
10 TABLET ORAL NIGHTLY PRN
Status: DISCONTINUED | OUTPATIENT
Start: 2018-03-06 | End: 2018-03-08 | Stop reason: HOSPADM

## 2018-03-06 RX ORDER — ONDANSETRON 2 MG/ML
4 INJECTION INTRAMUSCULAR; INTRAVENOUS EVERY 6 HOURS PRN
Status: DISCONTINUED | OUTPATIENT
Start: 2018-03-06 | End: 2018-03-08 | Stop reason: HOSPADM

## 2018-03-06 RX ADMIN — ADENOSINE 6 MG: 3 INJECTION, SOLUTION INTRAVENOUS at 15:11

## 2018-03-06 RX ADMIN — SODIUM CHLORIDE 500 ML: 9 INJECTION, SOLUTION INTRAVENOUS at 16:09

## 2018-03-06 RX ADMIN — LORAZEPAM 0.5 MG: 2 INJECTION INTRAMUSCULAR; INTRAVENOUS at 15:17

## 2018-03-06 RX ADMIN — SODIUM CHLORIDE 1000 ML: 9 INJECTION, SOLUTION INTRAVENOUS at 15:07

## 2018-03-06 RX ADMIN — ADENOSINE 12 MG: 3 INJECTION, SOLUTION INTRAVENOUS at 15:13

## 2018-03-06 RX ADMIN — METHYLNALTREXONE BROMIDE 6 MG: 12 INJECTION, SOLUTION SUBCUTANEOUS at 22:25

## 2018-03-06 RX ADMIN — LORAZEPAM 0.5 MG: 2 INJECTION INTRAMUSCULAR at 15:17

## 2018-03-06 RX ADMIN — OXYCODONE HYDROCHLORIDE 20 MG: 5 TABLET ORAL at 23:40

## 2018-03-06 NOTE — H&P
[facility] Medicine Services  HISTORY AND PHYSICAL    Date of Admission: 3/6/2018  Primary Care Physician: Brain Barreto MD    Subjective     Chief Complaint:   Palpitations, weakness, passed out    History of Present Illness    The patient states that at approximately 3 PM he was being visited by a home health nurse.  He became lightheaded, developed a rapid heart rate, became very weak and passed out.  He was witnessed to have simply slid out of his wheelchair onto the floor.  His blood pressure was in the 80s over 60s at the time.  He was subsequently transported to [facility] emergency Department Amara was noted to have a heart rate in the upper 170s.  Initial EKG was consistent with SVT and he was given 6 mg of adenosine without response.  He was then given 12 mg of adenosine and with response of the short duration of less than 1 minute.  The patient was subsequently cardioverted at 150 J where upon a sinus rhythm was obtained.  The patient was admitted thereafter for observation and treatment.  Notably the patient has been taking an increased amount of pain medication after recently having a surgical stabilization of an odontoid fracture.  Hemoglobin was noted to be low at 7.9.  Creatinine 1.58 with an estimated GFR of 44.  The patient was given 1 L fluid bolus in the emergency department but has been completely asymptomatic since that time.  Since arriving to the medical surgical floor the patient has been requesting pain medications frequently.    Review of Systems   Constitutional: Positive for activity change.   HENT: Negative.    Eyes: Negative.    Respiratory: Negative.    Cardiovascular: Negative.    Gastrointestinal: Positive for constipation.   Endocrine: Negative.    Genitourinary: Negative.    Musculoskeletal: Positive for back pain, gait problem and neck pain.   Skin: Negative.    Allergic/Immunologic: Negative.    Neurological: Positive for syncope and  weakness.   Hematological: Negative.    Psychiatric/Behavioral: Negative.       Otherwise complete ROS reviewed and negative except as mentioned in the HPI.      Past Medical History:     Past Medical History:   Diagnosis Date   • Anxiety    • Chronic kidney disease    • Chronic pain syndrome    • GI bleeding    • History of transfusion    • Hyperlipidemia    • Hypertension    • Injury of back    • Insomnia    • Neck injury    • PAF (paroxysmal atrial fibrillation)    • Therapeutic opioid induced constipation        Past Surgical History:  Past Surgical History:   Procedure Laterality Date   • BACK SURGERY     • CERVICAL LAMINECTOMY DECOMPRESSION POSTERIOR N/A 2/27/2018    Procedure: CERVICAL  POSTERIOR INSTRUMENTED FUSION C1-4;  Surgeon: Bandar Donis MD;  Location: [facility];  Service:        Family History:   family history includes Cancer in his mother; No Known Problems in his father.    Social History:    reports that he has never smoked. He has never used smokeless tobacco. He reports that he drinks alcohol. He reports that he does not use illicit drugs.    Medications:  Prior to Admission medications    Medication Sig Start Date End Date Taking? Authorizing Provider   ALPRAZolam (XANAX) 1 MG tablet Take 1 mg by mouth 3 (Three) Times a Day As Needed for Anxiety.   Yes Historical Provider, MD   atorvastatin (LIPITOR) 40 MG tablet Take 40 mg by mouth Daily.   Yes Historical Provider, MD   carisoprodol (SOMA) 350 MG tablet Take 1 tablet by mouth Every 8 (Eight) Hours As Needed for Muscle Spasms for up to 8 days. 3/2/18 3/10/18 Yes ROXIE Ardon   furosemide (LASIX) 40 MG tablet Take 40 mg by mouth Daily.   Yes Historical Provider, MD   gabapentin (NEURONTIN) 300 MG capsule Take 900 mg by mouth Every Night.   Yes Historical Provider, MD   losartan (COZAAR) 50 MG tablet Take 100 mg by mouth Daily.   Yes Historical Provider, MD   meloxicam (MOBIC) 15 MG tablet Take 15 mg by mouth Daily.   Yes  "Historical Provider, MD   oxyCODONE (ROXICODONE) 20 MG tablet Take 1 tablet by mouth Every 8 (Eight) Hours As Needed for Moderate Pain . 3/2/18  Yes ROXIE Ardon   potassium chloride (MICRO-K) 10 MEQ CR capsule Take 10 mEq by mouth 2 (Two) Times a Day.   Yes Historical Provider, MD   tamsulosin (FLOMAX) 0.4 MG capsule 24 hr capsule Take 1 capsule by mouth Daily. 3/2/18  Yes ROXIE Ardon   terazosin (HYTRIN) 1 MG capsule Take 1 mg by mouth 2 (Two) Times a Day.   Yes Historical Provider, MD   zolpidem (AMBIEN) 10 MG tablet Take 10 mg by mouth At Night As Needed for Sleep.   Yes Historical Provider, MD       Allergies:  Allergies   Allergen Reactions   • Codeine Itching   • Declomycin [Demeclocycline] Rash   • Tylenol [Acetaminophen] Itching     Patient tolerated Percocet inpatient and reports tolerating Percocet prior to admission.       Objective     Vital Signs:   BP (!) 86/52 (BP Location: Left arm, Patient Position: Lying)  Pulse 104  Temp 97.4 °F (36.3 °C) (Temporal Artery )   Resp 20  Ht 177.8 cm (70\")  Wt 76.4 kg (168 lb 6.4 oz)  SpO2 99%  BMI 24.16 kg/m2    Physical Exam   Constitutional: He is oriented to person, place, and time. He appears well-developed and well-nourished. No distress.   HENT:   Head: Normocephalic and atraumatic.   Right Ear: External ear normal.   Left Ear: External ear normal.   Nose: Nose normal.   Mouth/Throat: Oropharynx is clear and moist.   Eyes: Conjunctivae and EOM are normal. Pupils are equal, round, and reactive to light. No scleral icterus.   Neck:   Postoperative surgical stabilization collar in place   Cardiovascular: Normal rate, regular rhythm, normal heart sounds and intact distal pulses.    No murmur heard.  Pulmonary/Chest: Effort normal and breath sounds normal. No respiratory distress. He has no wheezes.   Abdominal: Soft. Bowel sounds are normal. He exhibits no distension and no mass. There is no tenderness.   Musculoskeletal: Normal range " of motion. He exhibits no edema or tenderness.   Neurological: He is alert and oriented to person, place, and time. He has normal reflexes. No cranial nerve deficit.   Skin: Skin is warm and dry. There is pallor.   Psychiatric: His behavior is normal. Judgment and thought content normal. His affect is blunt.       Results Reviewed:   Specimen:  Blood from Arm, Right Updated:  03/06/18 1548     WBC 4.33 (L) 10*3/mm3      RBC 2.54 (L) 10*6/mm3      Hemoglobin 7.9 (L) g/dL      Hematocrit 22.7 (L) %      MCV 89.4 fL      MCH 31.1 pg      MCHC 34.8 g/dL      RDW 14.6 %      RDW-SD 47.0 fl      MPV 9.5 fL      Platelets 190 10*3/mm3      Neutrophil % 67.5 %      Lymphocyte % 21.7 %      Monocyte % 8.3 %      Eosinophil % 1.8 %      Basophil % 0.2 %      Immature Grans % 0.5 %      Neutrophils, Absolute 2.92 10*3/mm3      Lymphocytes, Absolute 0.94 10*3/mm3      Monocytes, Absolute 0.36 10*3/mm3      Eosinophils, Absolute 0.08 10*3/mm3      Basophils, Absolute 0.01 10*3/mm3      Immature Grans, Absolute 0.02 10*3/mm3      nRBC 0.0 /100 WBC     Protime-INR [[id number]]  (Abnormal) Collected:  03/06/18 1503    Specimen:  Blood from Arm, Right Updated:  03/06/18 1548     Protime 15.6 (H) Seconds      INR 1.20 (H)    aPTT [[id number]]  (Abnormal) Collected:  03/06/18 1503    Specimen:  Blood from Arm, Right Updated:  03/06/18 1548     PTT 36.5 (H) seconds     D-dimer, Quantitative [[id number]]  (Abnormal) Collected:  03/06/18 1503    Specimen:  Blood from Arm, Right Updated:  03/06/18 1549     D-Dimer, Quantitative 2.36 (H) mg/L (FEU)     Narrative:       Reference Range is 0-0.50 mg/L FEU. However, results <0.50 mg/L FEU tends to rule out DVT or PE. Results >0.50 mg/L FEU are not useful in predicting absence or presence of DVT or PE.    Comprehensive Metabolic Panel [[id number]]  (Abnormal) Collected:  03/06/18 1503    Specimen:  Blood from Arm, Right Updated:  03/06/18 1550     Glucose 124 (H) mg/dL      BUN 33 (H) mg/dL       Creatinine 1.58 (H) mg/dL      Sodium 134 (L) mmol/L      Potassium 4.1 mmol/L      Chloride 95 (L) mmol/L      CO2 32.0 (H) mmol/L      Calcium 7.8 (L) mg/dL      Total Protein 5.5 (L) g/dL      Albumin 2.70 (L) g/dL      ALT (SGPT) 28 U/L      AST (SGOT) 29 U/L      Alkaline Phosphatase 87 U/L      Total Bilirubin 0.4 mg/dL      eGFR Non African Amer 44 (L) mL/min/1.73      Globulin 2.8 gm/dL      A/G Ratio 1.0 (L) g/dL      BUN/Creatinine Ratio 20.9     Anion Gap 7.0 mmol/L     Lipase [[id number]]  (Normal) Collected:  03/06/18 1503    Specimen:  Blood from Arm, Right Updated:  03/06/18 1550     Lipase 47 U/L     Magnesium [[id number]]  (Normal) Collected:  03/06/18 1503    Specimen:  Blood from Arm, Right Updated:  03/06/18 1550     Magnesium 1.8 mg/dL     BNP [[id number]]  (Abnormal) Collected:  03/06/18 1503    Specimen:  Blood from Arm, Right Updated:  03/06/18 1554     proBNP 1560.0 (H) pg/mL     Troponin [[id number]]  (Normal) Collected:  03/06/18 1503    Specimen:  Blood from Arm, Right Updated:  03/06/18 1558     Troponin I 0.017 ng/mL     T4, Free [[id number]]  (Normal) Collected:  03/06/18 1505    Specimen:  Blood from Arm, Right Updated:  03/06/18 1559     Free T4 1.81 ng/dL     TSH [[id number]]  (Normal) Collected:  03/06/18 1505    Specimen:  Blood from Arm, Right Updated:  03/06/18 1613     TSH 1.310 mIU/mL     Urinalysis With / Microscopic If Indicated - Urine, Catheter [[id number]]  (Normal) Collected:  03/06/18 1719    Specimen:  Urine from Urine, Catheter Updated:  03/06/18 1747     Color, UA Yellow     Appearance, UA Clear     pH, UA 7.0     Specific Gravity, UA 1.007     Glucose, UA Negative     Ketones, UA Negative     Bilirubin, UA Negative     Blood, UA Negative     Protein, UA Negative     Leuk Esterase, UA Negative     Nitrite, UA Negative     Urobilinogen, UA 1.0 E.U./dL    Narrative:       Urine microscopic not indicated.          Ct Cervical Spine Without Contrast    Result Date:  2/27/2018  Narrative: EXAMINATION: CT CERVICAL SPINE WITHOUT IV CONTRAST 02/27/2018  COMPARISON: CT cervical spine dated 04/07/2017.  INDICATION: Male, 67 years-old. Trauma  PROCEDURE: Multiple CT images of the cervical spine were obtained without IV contrast. Images were formatted in the axial, coronal and sagittal planes.  FINDINGS:  Acute type II odontoid fracture with approximately 5 mm retropulsion. Acute fracture through the C1 arch bilaterally.  Prior extensive surgery with corpectomy from C3 to C5, associated anterior and interbody fusion. There has also been interbody fusion at C6-7.  Prevertebral soft tissue swelling related to blood products suspected.   Multilevel degenerative changes.      Impression: 1.  Acute type II odontoid fracture with retropulsion. 2.  Acute posterior arch fracture bilaterally at C1. No displacement. This report was finalized on 02/27/2018 10:05 by Dr. Karey Ramirez MD.    Ct Limited Localized Follow Up Study    Result Date: 2/27/2018  Narrative: EXAMINATION:   CT LIMITED LOCALIZED FOLLOW UP STUDY-  2/27/2018 10:05 PM CST  HISTORY: Cervical fusion  3 fluoroscopic units 192 CT sequences are obtained. Cervical fusion is performed. 7. 8 seconds of fluoroscopic time was used for this exam. This report was finalized on 02/27/2018 22:06 by Dr. Michael Hsu MD.    Mri Cervical Spine Without Contrast    Result Date: 2/27/2018  Narrative: EXAM: MR CERVICAL SPINE WITHOUT IV CONTRAST 02/27/2018  COMPARISON: None.  HISTORY: 67 years-old Male.Spine fracture, traumatic, cervical  TECHNIQUE: Routine pulse sequences of the cervical were obtained without IV contrast.  FINDINGS: Cervical spine:  There is abnormal oblique signal extending through the odontoid process with associated abnormal fluid signal in the prevertebral space, consistent with an acute type II odontoid fracture. Extension of the fracture line through the posterior colon with a 5 mm displacement posteriorly of the most  cranial fragment. There is associate abnormal fluid signal in the extradural space circumferentially, probably reflecting blood products.  Furthermore, there is abnormal signal involving the posterior arch of C1 although there is seen on the CT of cervical spine dated same day. There is suboccipital abnormal fluid signal in the ligamentum nuchae, consistent with acute etiology.  Limited evaluation of the ligaments demonstrates preservation of the ligamentum flavum superiorly but not well visualized at C3-C5. The posterior longitudinal ligament shows a focal linear defect associated with the the fracture line most concerning for a disruption. Evaluation of the anterior longitudinal ligament is limited by artifact. Tectorial membrane is preserved. The alar ligaments, the transverse ligaments appear grossly preserved.  There is also an acute compression deformity anteriorly of the T2 vertebral body, with no evidence of retropulsion or posterior cortical disruption. There is approximately less than 30% height loss.  There is a 3 mm area of myelomalacia at C5-C6.   Evidence of prior extensive a corpectomy at C3 C5, with interbody fusion. C6-C7 interbody fusion as well. There is moderate spinal canal stenosis from C2-C3 down to C5-C6. There is suspected moderate spinal calcinosis at C2-C3 as well.       Impression: Cervical spine. 1.  Acute Type II odontoid fracture with suspected involvement of the posterior longitudinal ligament. 5 mm retropulsion. 2.  Acute fracture of the posterior arch of C1 bilaterally, best seen on CT. 3.  Extensive postsurgical changes. 4.  T2 compression deformity, also acute. 5.  Anatomic considerations as described above. This report was finalized on 02/27/2018 09:53 by Dr. Karey Ramirez MD.    Xr Chest 1 View    Result Date: 3/6/2018  Narrative: History: 67-year-old with SVT.  Reference: Chest radiograph April 7, 2017.  Findings: Frontal chest radiograph performed.  Cardiomegaly. There is  left basilar opacity likely representing combination of elevated hemidiaphragm, effusion and airspace opacity. Upper left lung zone is clear. Right lung is clear. No pneumothorax. Degenerative changes of the spine. Remote left-sided rib fractures are noted.       Impression: Left basilar opacity could represent combination of diaphragm elevation, small pleural effusion, and nonspecific airspace disease. CT could differentiate if needed. This report was finalized on 03/06/2018 16:07 by  Kev Montano, .    Fl C Arm During Surgery    Result Date: 2/27/2018  Narrative: EXAMINATION:   CT LIMITED LOCALIZED FOLLOW UP STUDY-  2/27/2018 10:05 PM CST  HISTORY: Cervical fusion  3 fluoroscopic units 192 CT sequences are obtained. Cervical fusion is performed. 7. 8 seconds of fluoroscopic time was used for this exam. This report was finalized on 02/27/2018 22:06 by Dr. Michael Hsu MD.    Xr Spine Cervical 2 View    Result Date: 2/27/2018  Narrative: EXAMINATION:   XR SPINE CERVICAL 2 VW-  2/27/2018 10:04 PM CST  HISTORY: Single views obtained operating room under the direction of Dr. Donis during a fusion. The patient has an odontoid fracture.  3 seconds of fluoroscopic time was utilized. This report was finalized on 02/27/2018 22:05 by Dr. Michael Hsu MD.    Fl O Arm During Surgery    Result Date: 2/27/2018  Narrative: EXAMINATION:   CT LIMITED LOCALIZED FOLLOW UP STUDY-  2/27/2018 10:05 PM CST  HISTORY: Cervical fusion  3 fluoroscopic units 192 CT sequences are obtained. Cervical fusion is performed. 7. 8 seconds of fluoroscopic time was used for this exam. This report was finalized on 02/27/2018 22:06 by Dr. Michael Hsu MD.     I have personally reviewed and interpreted the radiology studies and ECG obtained at time of admission.     Assessment / Plan      Assessment & Plan  Hospital Problem List     * (Principal)SVT (supraventricular tachycardia)    Hypotension due to drugs    Anemia    Odontoid fracture with type II  morphology    PAF (paroxysmal atrial fibrillation)    Chronic kidney disease, stage III (moderate)          PLAN:   Admit to telemetry  Type cross and infuse 2 units packed red blood cells followed by hemoglobin and hematocrit  Hold pain medications secondary to hypotension  Relistor for constipation.     Code Status:   Full code  Surrogate decision-maker is the patient's wife     I discussed the patients findings and my recommendations with: The patient    Estimated length of stay: One to 2 days    Hany Scott DO   03/06/18   6:00 PM

## 2018-03-06 NOTE — ED PROVIDER NOTES
[facility]  eMERGENCY dEPARTMENT eNCOUnter      Pt Name: Vikas Cruz  MRN: [id number]  Birthdate 1950  Date of evaluation: 3/6/2018      CHIEF COMPLAINT       Chief Complaint   Patient presents with   • Syncope   • Rapid Heart Rate   • Hypotension       Nurses Notes reviewed and I agree except as noted in the HPI.      HISTORY OF PRESENT ILLNESS    Vikas Cruz is a 67 y.o. male who presents     Patient presents with syncopal episode and rapid heart rate.  Patient was at home began to have palpitations and felt his heart racing and then had a syncopal episode where he slid down in his wheelchair.  He did not actually fall.  He was brought in and his heart rate was in the high 170s.  Patient's initial EKG indicative of SVT.  Patient was given 6 of adenosine without any real response.  He was then given 12 of adenosine with a short amount of response the lasted less than a minute.  Finally patient was shocked at 150 J and converted to sinus rhythm at approximately 70 bpm.  EKG performed just after conversion showed a rate of 100 bpm diffuse motion artifact       REVIEW OF SYSTEMS     Review of Systems  CONSTITUION: No Fever, No chills, No activity change, No diaphoresis, No unexpected wt change.    HENT: No congestion, no dental problems, no ear pain or discharge,   EYES: No drainage, no itching, no photophobia, no visual disturbance  RESPIRATORY: No apnea, no chest tightness, no cough, no shortness of breath, no stridor, no wheezing  CARDIOVASCULAR: As per the HPI otherwise negative.  GI: No abdominal distention, no abdominal pain, no rectal bleeding, no melena, no hematachezia, no diarrhea, no nausea, no vomiting  ENDOCRINE: No polydipsia, no polyphagia, no polyuria, no cold or heat intolerance  : No difficulty urinating, no dyspareunia, nodysuria, no flank pain, no frequency, no genital sore, no hematuria, no menstrual problem if female, no decreased urniation, no hesitation of urination, no  vaginal discharge if female, no vaginal pain if female no penile pain if male  ALLERG/IMMUNO:  No env or food allergies, not immunocompromised  NEUROLOGICAL: As per the HPI otherwise negative.  HEMATOLOGIC: No adenopathy, no unusual bleeding or bruising  MUSC: No arthralgia, no back pain, no joint swelling, no myalgias, no neck pain, no neck stiffness  SKIN: No rash, no color change, no pallor, no wound  PSYCH: No agitation, no behavior problem, no confusion, no decr concentration, no hallucinations, no suicidal ideation, no homicidal ideation, no self injury, no sleep disturbance      PAST MEDICAL HISTORY     Past Medical History:   Diagnosis Date   • History of transfusion    • Injury of back    • Neck injury        SURGICAL HISTORY      has a past surgical history that includes Back surgery and cervical laminectomy decompression posterior (N/A, 2/27/2018).    CURRENT MEDICATIONS        Medication List      ASK your doctor about these medications          ALPRAZolam 1 MG tablet   Commonly known as:  XANAX       AMBIEN 10 MG tablet   Generic drug:  zolpidem       atorvastatin 40 MG tablet   Commonly known as:  LIPITOR       carisoprodol 350 MG tablet   Commonly known as:  SOMA   Take 1 tablet by mouth Every 8 (Eight) Hours As Needed for Muscle Spasms   for up to 8 days.       furosemide 40 MG tablet   Commonly known as:  LASIX       gabapentin 300 MG capsule   Commonly known as:  NEURONTIN       losartan 50 MG tablet   Commonly known as:  COZAAR       meloxicam 15 MG tablet   Commonly known as:  MOBIC       oxyCODONE 20 MG tablet   Commonly known as:  ROXICODONE   Take 1 tablet by mouth Every 8 (Eight) Hours As Needed for Moderate Pain .         potassium chloride 10 MEQ CR capsule   Commonly known as:  MICRO-K       tamsulosin 0.4 MG capsule 24 hr capsule   Commonly known as:  FLOMAX   Take 1 capsule by mouth Daily.       terazosin 1 MG capsule   Commonly known as:  HYTRIN           ALLERGIES     is allergic to  "codeine; declomycin [demeclocycline]; and tylenol [acetaminophen].    FAMILY HISTORY     indicated that the status of his mother is unknown.  family history includes Cancer in his mother.    SOCIAL HISTORY      reports that he has never smoked. He has never used smokeless tobacco. He reports that he drinks alcohol. He reports that he does not use illicit drugs.    PHYSICAL EXAM     INITIAL VITALS:  height is 177.8 cm (70\") and weight is 84.8 kg (187 lb). His temperature is 97.6 °F (36.4 °C). His blood pressure is 93/63 and his pulse is 102. His respiration is 17 and oxygen saturation is 100%.    Physical Exam    CONSTITUTIONAL: Well developed, well nourished, not diaphoretic nor distressed  HENT: Normocephalic, atraumatic, oropharynx clear and moist  EYES: PERRL, EOM normal, no discharge, no scleral icterus  NECK: ROM normal, supple, no tracheal deviation nor JVD, no stridor  CARDIOVASCULAR: Tachycardia, heart sounds normal, no rub no gallop, intact distal pulses, normal cap refill  PULMONARY: Normal effort and breath sounds, no distress, no wheezes, rhonci or rales, no chest tenderness  ABDOMINAL: Soft, nontender, no guarding, no mass, no rebound, no hernia  GENITOURINARY/ANORECTAL: deferred  MUSCKULOSKELETAL: ROM normal, no tenderness nor deformity, no edema  NEUROLOGICAL: Alert, oriented x 3, DTRS normal, CN x 10 normal, normal tone  SKIN: Warm, dry, no erythema, no rash, normal color  PSYCH: Mood and affect normal, behavior normal, thought content and judgement normal.    DIFFERENTIAL DIAGNOSIS:       DIAGNOSTIC RESULTS     EKG: All EKG's are interpreted by the Emergency Department Physician who either signs or Co-signs this chart in the absence of a cardiologist.  Initial EKG shows initial EKG upon arrival supraventricular tachycardia 178 bpm no other interpretation other than strain pattern noted.  Repeat EKG after conversion shows sinus rhythm extensive motion artifact 100 bpm borderline left axis deviation " no other interpretation.    RADIOLOGY: non-plain film images(s) such as CT, Ultrasound and MRI are read by the radiologist.  Plain radiographic images are visualized and preliminarily interpreted by the emergency physician unless otherwise stated below.  Ct Cervical Spine Without Contrast    Result Date: 2/27/2018  Narrative: EXAMINATION: CT CERVICAL SPINE WITHOUT IV CONTRAST 02/27/2018  COMPARISON: CT cervical spine dated 04/07/2017.  INDICATION: Male, 67 years-old. Trauma  PROCEDURE: Multiple CT images of the cervical spine were obtained without IV contrast. Images were formatted in the axial, coronal and sagittal planes.  FINDINGS:  Acute type II odontoid fracture with approximately 5 mm retropulsion. Acute fracture through the C1 arch bilaterally.  Prior extensive surgery with corpectomy from C3 to C5, associated anterior and interbody fusion. There has also been interbody fusion at C6-7.  Prevertebral soft tissue swelling related to blood products suspected.   Multilevel degenerative changes.      Impression: 1.  Acute type II odontoid fracture with retropulsion. 2.  Acute posterior arch fracture bilaterally at C1. No displacement. This report was finalized on 02/27/2018 10:05 by Dr. Karey Ramirez MD.    Ct Limited Localized Follow Up Study    Result Date: 2/27/2018  Narrative: EXAMINATION:   CT LIMITED LOCALIZED FOLLOW UP STUDY-  2/27/2018 10:05 PM CST  HISTORY: Cervical fusion  3 fluoroscopic units 192 CT sequences are obtained. Cervical fusion is performed. 7. 8 seconds of fluoroscopic time was used for this exam. This report was finalized on 02/27/2018 22:06 by Dr. Michael Hsu MD.    Mri Cervical Spine Without Contrast    Result Date: 2/27/2018  Narrative: EXAM: MR CERVICAL SPINE WITHOUT IV CONTRAST 02/27/2018  COMPARISON: None.  HISTORY: 67 years-old Male.Spine fracture, traumatic, cervical  TECHNIQUE: Routine pulse sequences of the cervical were obtained without IV contrast.  FINDINGS: Cervical  spine:  There is abnormal oblique signal extending through the odontoid process with associated abnormal fluid signal in the prevertebral space, consistent with an acute type II odontoid fracture. Extension of the fracture line through the posterior colon with a 5 mm displacement posteriorly of the most cranial fragment. There is associate abnormal fluid signal in the extradural space circumferentially, probably reflecting blood products.  Furthermore, there is abnormal signal involving the posterior arch of C1 although there is seen on the CT of cervical spine dated same day. There is suboccipital abnormal fluid signal in the ligamentum nuchae, consistent with acute etiology.  Limited evaluation of the ligaments demonstrates preservation of the ligamentum flavum superiorly but not well visualized at C3-C5. The posterior longitudinal ligament shows a focal linear defect associated with the the fracture line most concerning for a disruption. Evaluation of the anterior longitudinal ligament is limited by artifact. Tectorial membrane is preserved. The alar ligaments, the transverse ligaments appear grossly preserved.  There is also an acute compression deformity anteriorly of the T2 vertebral body, with no evidence of retropulsion or posterior cortical disruption. There is approximately less than 30% height loss.  There is a 3 mm area of myelomalacia at C5-C6.   Evidence of prior extensive a corpectomy at C3 C5, with interbody fusion. C6-C7 interbody fusion as well. There is moderate spinal canal stenosis from C2-C3 down to C5-C6. There is suspected moderate spinal calcinosis at C2-C3 as well.       Impression: Cervical spine. 1.  Acute Type II odontoid fracture with suspected involvement of the posterior longitudinal ligament. 5 mm retropulsion. 2.  Acute fracture of the posterior arch of C1 bilaterally, best seen on CT. 3.  Extensive postsurgical changes. 4.  T2 compression deformity, also acute. 5.  Anatomic  considerations as described above. This report was finalized on 02/27/2018 09:53 by Dr. Karey Ramirez MD.    Fl C Arm During Surgery    Result Date: 2/27/2018  Narrative: EXAMINATION:   CT LIMITED LOCALIZED FOLLOW UP STUDY-  2/27/2018 10:05 PM CST  HISTORY: Cervical fusion  3 fluoroscopic units 192 CT sequences are obtained. Cervical fusion is performed. 7. 8 seconds of fluoroscopic time was used for this exam. This report was finalized on 02/27/2018 22:06 by Dr. Michael sHu MD.    Xr Spine Cervical 2 View    Result Date: 2/27/2018  Narrative: EXAMINATION:   XR SPINE CERVICAL 2 VW-  2/27/2018 10:04 PM CST  HISTORY: Single views obtained operating room under the direction of Dr. Donis during a fusion. The patient has an odontoid fracture.  3 seconds of fluoroscopic time was utilized. This report was finalized on 02/27/2018 22:05 by Dr. Michael Hsu MD.    Fl O Arm During Surgery    Result Date: 2/27/2018  Narrative: EXAMINATION:   CT LIMITED LOCALIZED FOLLOW UP STUDY-  2/27/2018 10:05 PM CST  HISTORY: Cervical fusion  3 fluoroscopic units 192 CT sequences are obtained. Cervical fusion is performed. 7. 8 seconds of fluoroscopic time was used for this exam. This report was finalized on 02/27/2018 22:06 by Dr. Michael Hsu MD.             LABS:   Lab Results (last 24 hours)     Procedure Component Value Units Date/Time    Comprehensive Metabolic Panel [[id number]] Collected:  03/06/18 1503    Specimen:  Blood from Arm, Right Updated:  03/06/18 1535    Lipase [[id number]] Collected:  03/06/18 1503    Specimen:  Blood from Arm, Right Updated:  03/06/18 1535    BNP [[id number]] Collected:  03/06/18 1503    Specimen:  Blood from Arm, Right Updated:  03/06/18 1535    Troponin [[id number]] Collected:  03/06/18 1503    Specimen:  Blood from Arm, Right Updated:  03/06/18 1535    Magnesium [[id number]] Collected:  03/06/18 1503    Specimen:  Blood from Arm, Right Updated:  03/06/18 1535    TSH [[id number]] Collected:   03/06/18 1505    Specimen:  Blood from Arm, Right Updated:  03/06/18 1535    T4, Free [[id number]] Collected:  03/06/18 1505    Specimen:  Blood from Arm, Right Updated:  03/06/18 1535          EMERGENCY DEPARTMENT COURSE:   Vitals:    Vitals:    03/06/18 1525 03/06/18 1527 03/06/18 1531 03/06/18 1534   BP:  90/59 93/63    Pulse:  101 105 102   Resp:       Temp:       SpO2: 100% 100% 99% 100%   Weight:       Height:           The patient was given the following medications:  Medications   adenosine (ADENOCARD) injection 6 mg (6 mg Intravenous Given 3/6/18 1511)   sodium chloride 0.9 % bolus 1,000 mL (0 mL Intravenous Stopped 3/6/18 1531)   adenosine (ADENOCARD) injection 12 mg (12 mg Intravenous Given 3/6/18 1513)   LORazepam (ATIVAN) injection 0.5 mg (0.5 mg Intravenous Given 3/6/18 1517)       ED Course       CRITICAL CARE:  CRITICAL CARE: There was a high probability of clinically significant/life threatening deterioration in this patient's condition which required my urgent intervention.  Total critical care time was 35 minutes.  This excludes any time for separately reportable procedures.         CONSULTS:  none    PROCEDURES:  None    FINAL IMPRESSION      1. SVT (supraventricular tachycardia)          DISPOSITION/PLAN   Patient admitted in much improved condition to the service of the hospitalist.      PATIENT REFERRED TO:  No follow-up provider specified.    DISCHARGE MEDICATIONS:     Medication List      ASK your doctor about these medications          ALPRAZolam 1 MG tablet   Commonly known as:  XANAX       AMBIEN 10 MG tablet   Generic drug:  zolpidem       atorvastatin 40 MG tablet   Commonly known as:  LIPITOR       carisoprodol 350 MG tablet   Commonly known as:  SOMA   Take 1 tablet by mouth Every 8 (Eight) Hours As Needed for Muscle Spasms   for up to 8 days.       furosemide 40 MG tablet   Commonly known as:  LASIX       gabapentin 300 MG capsule   Commonly known as:  NEURONTIN       losartan 50 MG  tablet   Commonly known as:  COZAAR       meloxicam 15 MG tablet   Commonly known as:  MOBIC       oxyCODONE 20 MG tablet   Commonly known as:  ROXICODONE   Take 1 tablet by mouth Every 8 (Eight) Hours As Needed for Moderate Pain .         potassium chloride 10 MEQ CR capsule   Commonly known as:  MICRO-K       tamsulosin 0.4 MG capsule 24 hr capsule   Commonly known as:  FLOMAX   Take 1 capsule by mouth Daily.       terazosin 1 MG capsule   Commonly known as:  HYTRIN           (Please note that portions of this note were completed with a voice recognition program.)    Jaz Gaines, DO Jaz Gaines DO  03/06/18 1544

## 2018-03-07 ENCOUNTER — APPOINTMENT (OUTPATIENT)
Dept: CARDIOLOGY | Facility: HOSPITAL | Age: 68
End: 2018-03-07

## 2018-03-07 VITALS
BODY MASS INDEX: 24.11 KG/M2 | TEMPERATURE: 97.4 F | HEART RATE: 70 BPM | SYSTOLIC BLOOD PRESSURE: 114 MMHG | WEIGHT: 168.4 LBS | RESPIRATION RATE: 18 BRPM | HEIGHT: 70 IN | DIASTOLIC BLOOD PRESSURE: 74 MMHG | OXYGEN SATURATION: 98 %

## 2018-03-07 LAB
ANION GAP SERPL CALCULATED.3IONS-SCNC: 7 MMOL/L (ref 4–13)
BASOPHILS # BLD AUTO: 0.03 10*3/MM3 (ref 0–0.2)
BASOPHILS NFR BLD AUTO: 0.6 % (ref 0–2)
BH CV ECHO MEAS - AO MAX PG (FULL): 6.1 MMHG
BH CV ECHO MEAS - AO MAX PG: 8.8 MMHG
BH CV ECHO MEAS - AO MEAN PG (FULL): 4 MMHG
BH CV ECHO MEAS - AO MEAN PG: 5 MMHG
BH CV ECHO MEAS - AO ROOT AREA (BSA CORRECTED): 1.5
BH CV ECHO MEAS - AO ROOT AREA: 6.6 CM^2
BH CV ECHO MEAS - AO ROOT DIAM: 2.9 CM
BH CV ECHO MEAS - AO V2 MAX: 148 CM/SEC
BH CV ECHO MEAS - AO V2 MEAN: 102 CM/SEC
BH CV ECHO MEAS - AO V2 VTI: 29 CM
BH CV ECHO MEAS - AVA(I,A): 2.3 CM^2
BH CV ECHO MEAS - AVA(I,D): 2.3 CM^2
BH CV ECHO MEAS - AVA(V,A): 2.1 CM^2
BH CV ECHO MEAS - AVA(V,D): 2.1 CM^2
BH CV ECHO MEAS - BSA(HAYCOCK): 2 M^2
BH CV ECHO MEAS - BSA: 2 M^2
BH CV ECHO MEAS - BZI_BMI: 25.1 KILOGRAMS/M^2
BH CV ECHO MEAS - BZI_METRIC_HEIGHT: 177.8 CM
BH CV ECHO MEAS - BZI_METRIC_WEIGHT: 79.4 KG
BH CV ECHO MEAS - CONTRAST EF 4CH: 65.2 ML/M^2
BH CV ECHO MEAS - EDV(CUBED): 91.7 ML
BH CV ECHO MEAS - EDV(MOD-SP4): 99.6 ML
BH CV ECHO MEAS - EDV(TEICH): 92.9 ML
BH CV ECHO MEAS - ESV(MOD-SP4): 34.7 ML
BH CV ECHO MEAS - IVS/LVPW: 0.86
BH CV ECHO MEAS - IVSD: 0.82 CM
BH CV ECHO MEAS - LV DIASTOLIC VOL/BSA (35-75): 50.5 ML/M^2
BH CV ECHO MEAS - LV MASS(C)D: 130.6 GRAMS
BH CV ECHO MEAS - LV MASS(C)DI: 66.2 GRAMS/M^2
BH CV ECHO MEAS - LV MAX PG: 2.6 MMHG
BH CV ECHO MEAS - LV MEAN PG: 1 MMHG
BH CV ECHO MEAS - LV SYSTOLIC VOL/BSA (12-30): 17.6 ML/M^2
BH CV ECHO MEAS - LV V1 MAX: 80.9 CM/SEC
BH CV ECHO MEAS - LV V1 MEAN: 49.6 CM/SEC
BH CV ECHO MEAS - LV V1 VTI: 17.2 CM
BH CV ECHO MEAS - LVIDD: 4.5 CM
BH CV ECHO MEAS - LVLD AP4: 7.9 CM
BH CV ECHO MEAS - LVLS AP4: 7 CM
BH CV ECHO MEAS - LVOT AREA (M): 3.8 CM^2
BH CV ECHO MEAS - LVOT AREA: 3.8 CM^2
BH CV ECHO MEAS - LVOT DIAM: 2.2 CM
BH CV ECHO MEAS - LVPWD: 0.95 CM
BH CV ECHO MEAS - MR MAX PG: 16 MMHG
BH CV ECHO MEAS - MR MAX VEL: 200 CM/SEC
BH CV ECHO MEAS - MV A MAX VEL: 87.3 CM/SEC
BH CV ECHO MEAS - MV DEC TIME: 0.25 SEC
BH CV ECHO MEAS - MV E MAX VEL: 79.1 CM/SEC
BH CV ECHO MEAS - MV E/A: 0.91
BH CV ECHO MEAS - RAP SYSTOLE: 5 MMHG
BH CV ECHO MEAS - RVSP: 30.2 MMHG
BH CV ECHO MEAS - SI(AO): 97.1 ML/M^2
BH CV ECHO MEAS - SI(LVOT): 33.2 ML/M^2
BH CV ECHO MEAS - SI(MOD-SP4): 32.9 ML/M^2
BH CV ECHO MEAS - SV(AO): 191.6 ML
BH CV ECHO MEAS - SV(LVOT): 65.4 ML
BH CV ECHO MEAS - SV(MOD-SP4): 64.9 ML
BH CV ECHO MEAS - TR MAX VEL: 251 CM/SEC
BUN BLD-MCNC: 31 MG/DL (ref 5–21)
BUN/CREAT SERPL: 19.9 (ref 7–25)
CALCIUM SPEC-SCNC: 8 MG/DL (ref 8.4–10.4)
CHLORIDE SERPL-SCNC: 99 MMOL/L (ref 98–110)
CO2 SERPL-SCNC: 28 MMOL/L (ref 24–31)
CREAT BLD-MCNC: 1.56 MG/DL (ref 0.5–1.4)
DEPRECATED RDW RBC AUTO: 46.5 FL (ref 40–54)
E/E' RATIO: 9.4
EOSINOPHIL # BLD AUTO: 0.13 10*3/MM3 (ref 0–0.7)
EOSINOPHIL NFR BLD AUTO: 2.8 % (ref 0–4)
ERYTHROCYTE [DISTWIDTH] IN BLOOD BY AUTOMATED COUNT: 14.8 % (ref 12–15)
GFR SERPL CREATININE-BSD FRML MDRD: 45 ML/MIN/1.73
GLUCOSE BLD-MCNC: 97 MG/DL (ref 70–100)
HCT VFR BLD AUTO: 27 % (ref 40–52)
HGB BLD-MCNC: 9.5 G/DL (ref 14–18)
IMM GRANULOCYTES # BLD: 0.03 10*3/MM3 (ref 0–0.03)
IMM GRANULOCYTES NFR BLD: 0.6 % (ref 0–5)
LEFT ATRIUM VOLUME INDEX: 35.7 ML/M2
LEFT ATRIUM VOLUME: 70.4 CM3
LYMPHOCYTES # BLD AUTO: 1.16 10*3/MM3 (ref 0.72–4.86)
LYMPHOCYTES NFR BLD AUTO: 24.9 % (ref 15–45)
MAXIMAL PREDICTED HEART RATE: 153 BPM
MCH RBC QN AUTO: 31.3 PG (ref 28–32)
MCHC RBC AUTO-ENTMCNC: 35.2 G/DL (ref 33–36)
MCV RBC AUTO: 88.8 FL (ref 82–95)
MONOCYTES # BLD AUTO: 0.41 10*3/MM3 (ref 0.19–1.3)
MONOCYTES NFR BLD AUTO: 8.8 % (ref 4–12)
NEUTROPHILS # BLD AUTO: 2.89 10*3/MM3 (ref 1.87–8.4)
NEUTROPHILS NFR BLD AUTO: 62.3 % (ref 39–78)
NRBC BLD MANUAL-RTO: 0 /100 WBC (ref 0–0)
PLATELET # BLD AUTO: 172 10*3/MM3 (ref 130–400)
PMV BLD AUTO: 9.4 FL (ref 6–12)
POTASSIUM BLD-SCNC: 3.7 MMOL/L (ref 3.5–5.3)
RBC # BLD AUTO: 3.04 10*6/MM3 (ref 4.8–5.9)
SODIUM BLD-SCNC: 134 MMOL/L (ref 135–145)
STRESS TARGET HR: 130 BPM
WBC NRBC COR # BLD: 4.65 10*3/MM3 (ref 4.8–10.8)

## 2018-03-07 PROCEDURE — 94799 UNLISTED PULMONARY SVC/PX: CPT

## 2018-03-07 PROCEDURE — 99222 1ST HOSP IP/OBS MODERATE 55: CPT | Performed by: INTERNAL MEDICINE

## 2018-03-07 PROCEDURE — 93306 TTE W/DOPPLER COMPLETE: CPT | Performed by: INTERNAL MEDICINE

## 2018-03-07 PROCEDURE — 80048 BASIC METABOLIC PNL TOTAL CA: CPT | Performed by: FAMILY MEDICINE

## 2018-03-07 PROCEDURE — 93306 TTE W/DOPPLER COMPLETE: CPT

## 2018-03-07 PROCEDURE — 85025 COMPLETE CBC W/AUTO DIFF WBC: CPT | Performed by: FAMILY MEDICINE

## 2018-03-07 PROCEDURE — 25010000002 PERFLUTREN 6.52 MG/ML SUSPENSION: Performed by: FAMILY MEDICINE

## 2018-03-07 RX ORDER — BISACODYL 10 MG
10 SUPPOSITORY, RECTAL RECTAL DAILY PRN
Status: DISCONTINUED | OUTPATIENT
Start: 2018-03-07 | End: 2018-03-08 | Stop reason: HOSPADM

## 2018-03-07 RX ADMIN — BISACODYL 10 MG: 10 SUPPOSITORY RECTAL at 08:35

## 2018-03-07 RX ADMIN — ALPRAZOLAM 1 MG: 0.5 TABLET ORAL at 00:38

## 2018-03-07 RX ADMIN — METOPROLOL TARTRATE 12.5 MG: 25 TABLET, FILM COATED ORAL at 14:38

## 2018-03-07 RX ADMIN — PHENOL 2 SPRAY: 1.5 LIQUID ORAL at 14:38

## 2018-03-07 RX ADMIN — PERFLUTREN: 6.52 INJECTION, SUSPENSION INTRAVENOUS at 15:58

## 2018-03-07 RX ADMIN — TAMSULOSIN HYDROCHLORIDE 0.4 MG: 0.4 CAPSULE ORAL at 08:30

## 2018-03-07 RX ADMIN — OXYCODONE HYDROCHLORIDE 20 MG: 5 TABLET ORAL at 08:30

## 2018-03-07 NOTE — CONSULTS
[facility] CONSULT NOTE    Referring Provider: Itzel Spears MD    Reason for Consultation: SVT    Chief complaint:   Chief Complaint   Patient presents with   • Syncope   • Rapid Heart Rate   • Hypotension       Subjective .     History of present illness:  Vikas Cruz is a 67 y.o. male with history of Atrial Fibrillation, Hypertension, Hyperlipidemia, Chronic Back Pain, Chronic Kidney Disease, and chronic back pain. Patient was previously followed by Dr. Dewitt in our office for Atrial Fibrillation- patient was last seen in 2009 for which he was on coumadin when last seen- there is some question of GI bleeding in history. Patient is a poor historian infact patient is addiment that he never had A-fib. Patient had a recent fall that was though to be secondary to being tripped for which he had a neck fracture for which he underwent C1-C4 fusion surgery in February. Patient has home health coming to home for PT and assistance. Patient reports he has not been able to poop and this has caused him to not eat anything in over a week he reports. Patient states that he has been lightheaded and dizzy upon standing. Reports overall weakness. Home health nurse was at the house when patient reportedly had a syncopal episode after becoming lightheaded. Patient was brought to ER and found to be in SVT, was treated with multiple doses of adenosine with no response for which patient was cardioverted successfully. Patient was admitted for observation. Patient has been monitored on telemetry with 2 brief episodes of further SVT lasting only a few seconds. Patient was asymptotic.     History  Past Medical History:   Diagnosis Date   • Anxiety    • Chronic kidney disease    • Chronic pain syndrome    • GI bleeding    • History of transfusion    • Hyperlipidemia    • Hypertension    • Injury of back    • Insomnia    • Neck injury    • PAF (paroxysmal atrial fibrillation)    • Therapeutic opioid induced  constipation    ,   Past Surgical History:   Procedure Laterality Date   • BACK SURGERY     • CERVICAL LAMINECTOMY DECOMPRESSION POSTERIOR N/A 2/27/2018    Procedure: CERVICAL  POSTERIOR INSTRUMENTED FUSION C1-4;  Surgeon: Bandar Donis MD;  Location: [facility] OR;  Service:    • ODONTOID FRACTURE SURGERY     ,   Family History   Problem Relation Age of Onset   • Cancer Mother    • No Known Problems Father    ,   Social History   Substance Use Topics   • Smoking status: Never Smoker   • Smokeless tobacco: Never Used   • Alcohol use Yes      Comment: occassionally   ,     Medications    Prior to Admission medications    Medication Sig Start Date End Date Taking? Authorizing Provider   ALPRAZolam (XANAX) 1 MG tablet Take 1 mg by mouth 3 (Three) Times a Day As Needed for Anxiety.   Yes Historical Provider, MD   atorvastatin (LIPITOR) 40 MG tablet Take 40 mg by mouth Daily.   Yes Historical Provider, MD   carisoprodol (SOMA) 350 MG tablet Take 1 tablet by mouth Every 8 (Eight) Hours As Needed for Muscle Spasms for up to 8 days. 3/2/18 3/10/18 Yes ROXIE Ardon   furosemide (LASIX) 40 MG tablet Take 40 mg by mouth Daily.   Yes Historical Provider, MD   gabapentin (NEURONTIN) 300 MG capsule Take 900 mg by mouth Every Night.   Yes Historical Provider, MD   losartan (COZAAR) 50 MG tablet Take 100 mg by mouth Daily.   Yes Historical Provider, MD   meloxicam (MOBIC) 15 MG tablet Take 15 mg by mouth Daily.   Yes Historical Provider, MD   oxyCODONE (ROXICODONE) 20 MG tablet Take 1 tablet by mouth Every 8 (Eight) Hours As Needed for Moderate Pain . 3/2/18  Yes ROXIE Ardon   potassium chloride (MICRO-K) 10 MEQ CR capsule Take 10 mEq by mouth 2 (Two) Times a Day.   Yes Historical Provider, MD   tamsulosin (FLOMAX) 0.4 MG capsule 24 hr capsule Take 1 capsule by mouth Daily. 3/2/18  Yes ROXIE Ardon   terazosin (HYTRIN) 1 MG capsule Take 1 mg by mouth 2 (Two) Times a Day.   Yes Historical Provider,  MD   zolpidem (AMBIEN) 10 MG tablet Take 10 mg by mouth At Night As Needed for Sleep.   Yes Historical Provider, MD       Current Facility-Administered Medications   Medication Dose Route Frequency Provider Last Rate Last Dose   • acetaminophen (TYLENOL) tablet 650 mg  650 mg Oral Q4H PRN Hany Scott DO       • ALPRAZolam (XANAX) tablet 1 mg  1 mg Oral TID PRN Hany Scott DO   1 mg at 03/07/18 0038   • aluminum-magnesium hydroxide-simethicone (MAALOX MAX) 400-400-40 MG/5ML suspension 15 mL  15 mL Oral Q6H PRN Hany Scott, DO       • atorvastatin (LIPITOR) tablet 40 mg  40 mg Oral Nightly Hany Scott DO       • bisacodyl (DULCOLAX) suppository 10 mg  10 mg Rectal Daily PRN Hany Scott DO   10 mg at 03/07/18 0835   • gabapentin (NEURONTIN) capsule 900 mg  900 mg Oral Nightly Hany Scott DO       • methylnaltrexone (RELISTOR) injection 6 mg  6 mg Subcutaneous Every Other Day Hany Scott DO   6 mg at 03/06/18 2225   • ondansetron (ZOFRAN) injection 4 mg  4 mg Intravenous Q6H PRN Hany Scott DO       • oxyCODONE (ROXICODONE) immediate release tablet 20 mg  20 mg Oral Q8H PRN Hany Scott DO   20 mg at 03/07/18 0830   • phenol (CHLORASEPTIC) 1.4 % liquid 2 spray  2 spray Mouth/Throat Q2H PRN Hill Oh MD       • sodium chloride 0.9 % flush 1-10 mL  1-10 mL Intravenous PRN Hany Scott DO       • tamsulosin (FLOMAX) 24 hr capsule 0.4 mg  0.4 mg Oral Daily Hany Scott DO   0.4 mg at 03/07/18 0830   • zolpidem (AMBIEN) tablet 10 mg  10 mg Oral Nightly PRN Hany Scott DO           Allergies:  Codeine; Declomycin [demeclocycline]; and Tylenol [acetaminophen]    Review of Systems  Review of Systems   Constitution: Positive for weakness and malaise/fatigue. Negative for chills, decreased appetite, fever, weight gain and weight loss.   HENT: Negative for nosebleeds.    Eyes: Negative for visual disturbance.   Cardiovascular:  "Positive for syncope. Negative for chest pain, dyspnea on exertion, leg swelling, near-syncope, orthopnea, palpitations and paroxysmal nocturnal dyspnea.   Respiratory: Negative for cough, hemoptysis, shortness of breath and snoring.    Endocrine: Negative for cold intolerance and heat intolerance.   Hematologic/Lymphatic: Negative for bleeding problem. Does not bruise/bleed easily.   Skin: Negative for rash.   Musculoskeletal: Negative for back pain and falls.   Gastrointestinal: Positive for anorexia and constipation. Negative for abdominal pain, diarrhea, heartburn, melena, nausea and vomiting.   Genitourinary: Negative for hematuria.   Neurological: Positive for light-headedness. Negative for dizziness and headaches.   Psychiatric/Behavioral: Negative for altered mental status.   Allergic/Immunologic: Negative for persistent infections.       Objective     Physical Exam:  Patient Vitals for the past 24 hrs:   BP Temp Temp src Pulse Resp SpO2 Height Weight   03/07/18 1117 117/70 97.8 °F (36.6 °C) Temporal Art 86 20 96 % - -   03/07/18 0724 113/67 98.1 °F (36.7 °C) Temporal Art 86 20 97 % - -   03/07/18 0437 109/66 98 °F (36.7 °C) Temporal Art 87 20 98 % - -   03/07/18 0326 109/66 98 °F (36.7 °C) - 87 20 98 % - -   03/07/18 0024 106/64 98.7 °F (37.1 °C) Oral 100 20 99 % - -   03/07/18 0009 106/62 98.7 °F (37.1 °C) Oral 100 20 97 % - -   03/06/18 2337 104/61 98.3 °F (36.8 °C) Temporal Art 71 20 - - -   03/06/18 2249 95/60 - - - - - - -   03/06/18 2225 (!) 88/56 - - 99 - - - -   03/06/18 2223 98/58 - - 97 - - - -   03/06/18 2122 (!) 89/54 98.6 °F (37 °C) Temporal Art (!) 121 20 99 % - -   03/06/18 2000 90/49 97.7 °F (36.5 °C) Temporal Art 106 20 99 % - -   03/06/18 1838 (!) 88/60 - - - - - - -   03/06/18 1705 (!) 86/52 - - 104 - - - -   03/06/18 1658 (!) 83/51 97.4 °F (36.3 °C) Temporal Art 102 20 99 % 177.8 cm (70\") 76.4 kg (168 lb 6.4 oz)   03/06/18 1613 - - - 102 - 100 % - -   03/06/18 1609 - - - 104 - 99 % - - " "  03/06/18 1605 91/65 - - 103 - 99 % - -   03/06/18 1556 92/63 - - 100 - 98 % - -   03/06/18 1555 - - - 99 - 99 % - -   03/06/18 1552 - - - 101 - 97 % - -   03/06/18 1549 - - - 101 - 100 % - -   03/06/18 1546 (!) 88/58 - - 103 - 97 % - -   03/06/18 1535 - - - 102 - 100 % - -   03/06/18 1534 - - - 102 - 100 % - -   03/06/18 1531 93/63 - - 105 - 99 % - -   03/06/18 1527 90/59 - - 101 - 100 % - -   03/06/18 1525 - - - - - 100 % - -   03/06/18 1525 - - - 107 - 100 % 177.8 cm (70\") 84.8 kg (187 lb)   03/06/18 1524 - - - 101 17 99 % - -   03/06/18 1523 - - - 100 - 99 % - -   03/06/18 1522 - - - 101 - 99 % - -   03/06/18 1521 - - - 102 - 100 % - -   03/06/18 1520 - - - 102 - - - -   03/06/18 1519 - - - 98 - - - -   03/06/18 1518 - - - 96 - - - -   03/06/18 1518 - - - 92 - - - -   03/06/18 1517 - - - (!) 172 - - - -   03/06/18 1516 90/66 - - (!) 173 - 100 % - -   03/06/18 1515 - 97.6 °F (36.4 °C) - - - - - -   03/06/18 1514 - - - 102 - - - -   03/06/18 1513 - - - (!) 176 - - - -   03/06/18 1512 - - - (!) 175 - - - -   03/06/18 1511 - - - (!) 167 - - - -   03/06/18 1510 - - - (!) 175 - - - -   03/06/18 1505 (!) 73/29 - - (!) 178 - - - -   03/06/18 1504 - - - (!) 178 - (!) 88 % - -     Physical Exam   Constitutional: He is oriented to person, place, and time. He appears well-developed and well-nourished.   unkept   HENT:   Head: Normocephalic and atraumatic.   Eyes: Pupils are equal, round, and reactive to light.   Neck: No JVD present. Carotid bruit is not present.   c-collar in place   Cardiovascular: Normal rate, regular rhythm, normal heart sounds and intact distal pulses.    Pulmonary/Chest: Effort normal and breath sounds normal.   Abdominal: Soft. Bowel sounds are normal.   Musculoskeletal: Normal range of motion.   Neurological: He is alert and oriented to person, place, and time. He has normal reflexes.   Skin: Skin is warm and dry.   Psychiatric: He has a normal mood and affect. His behavior is normal. Judgment and " thought content normal.       Results Review:   I reviewed the patient's new clinical results.  Lab Results (last 72 hours)     Procedure Component Value Units Date/Time    CBC & Differential [[id number]] Collected:  03/06/18 1503    Specimen:  Blood Updated:  03/06/18 1548    Narrative:       The following orders were created for panel order CBC & Differential.  Procedure                               Abnormality         Status                     ---------                               -----------         ------                     CBC Auto Differential[[id number]]        Abnormal            Final result                 Please view results for these tests on the individual orders.    CBC Auto Differential [[id number]]  (Abnormal) Collected:  03/06/18 1503    Specimen:  Blood from Arm, Right Updated:  03/06/18 1548     WBC 4.33 (L) 10*3/mm3      RBC 2.54 (L) 10*6/mm3      Hemoglobin 7.9 (L) g/dL      Hematocrit 22.7 (L) %      MCV 89.4 fL      MCH 31.1 pg      MCHC 34.8 g/dL      RDW 14.6 %      RDW-SD 47.0 fl      MPV 9.5 fL      Platelets 190 10*3/mm3      Neutrophil % 67.5 %      Lymphocyte % 21.7 %      Monocyte % 8.3 %      Eosinophil % 1.8 %      Basophil % 0.2 %      Immature Grans % 0.5 %      Neutrophils, Absolute 2.92 10*3/mm3      Lymphocytes, Absolute 0.94 10*3/mm3      Monocytes, Absolute 0.36 10*3/mm3      Eosinophils, Absolute 0.08 10*3/mm3      Basophils, Absolute 0.01 10*3/mm3      Immature Grans, Absolute 0.02 10*3/mm3      nRBC 0.0 /100 WBC     Protime-INR [[id number]]  (Abnormal) Collected:  03/06/18 1503    Specimen:  Blood from Arm, Right Updated:  03/06/18 1548     Protime 15.6 (H) Seconds      INR 1.20 (H)    aPTT [[id number]]  (Abnormal) Collected:  03/06/18 1503    Specimen:  Blood from Arm, Right Updated:  03/06/18 1548     PTT 36.5 (H) seconds     D-dimer, Quantitative [[id number]]  (Abnormal) Collected:  03/06/18 1503    Specimen:  Blood from Arm, Right Updated:  03/06/18 1549     D-Dimer,  Quantitative 2.36 (H) mg/L (FEU)     Narrative:       Reference Range is 0-0.50 mg/L FEU. However, results <0.50 mg/L FEU tends to rule out DVT or PE. Results >0.50 mg/L FEU are not useful in predicting absence or presence of DVT or PE.    Comprehensive Metabolic Panel [[id number]]  (Abnormal) Collected:  03/06/18 1503    Specimen:  Blood from Arm, Right Updated:  03/06/18 1550     Glucose 124 (H) mg/dL      BUN 33 (H) mg/dL      Creatinine 1.58 (H) mg/dL      Sodium 134 (L) mmol/L      Potassium 4.1 mmol/L      Chloride 95 (L) mmol/L      CO2 32.0 (H) mmol/L      Calcium 7.8 (L) mg/dL      Total Protein 5.5 (L) g/dL      Albumin 2.70 (L) g/dL      ALT (SGPT) 28 U/L      AST (SGOT) 29 U/L      Alkaline Phosphatase 87 U/L      Total Bilirubin 0.4 mg/dL      eGFR Non African Amer 44 (L) mL/min/1.73      Globulin 2.8 gm/dL      A/G Ratio 1.0 (L) g/dL      BUN/Creatinine Ratio 20.9     Anion Gap 7.0 mmol/L     Lipase [[id number]]  (Normal) Collected:  03/06/18 1503    Specimen:  Blood from Arm, Right Updated:  03/06/18 1550     Lipase 47 U/L     Magnesium [[id number]]  (Normal) Collected:  03/06/18 1503    Specimen:  Blood from Arm, Right Updated:  03/06/18 1550     Magnesium 1.8 mg/dL     BNP [[id number]]  (Abnormal) Collected:  03/06/18 1503    Specimen:  Blood from Arm, Right Updated:  03/06/18 1554     proBNP 1560.0 (H) pg/mL     Troponin [[id number]]  (Normal) Collected:  03/06/18 1503    Specimen:  Blood from Arm, Right Updated:  03/06/18 1558     Troponin I 0.017 ng/mL     T4, Free [[id number]]  (Normal) Collected:  03/06/18 1505    Specimen:  Blood from Arm, Right Updated:  03/06/18 1559     Free T4 1.81 ng/dL     TSH [[id number]]  (Normal) Collected:  03/06/18 1505    Specimen:  Blood from Arm, Right Updated:  03/06/18 1613     TSH 1.310 mIU/mL     [place] Draw [[id number]] Collected:  03/06/18 1503    Specimen:  Blood Updated:  03/06/18 1616    Narrative:       The following orders were created for panel order [place]  Draw.  Procedure                               Abnormality         Status                     ---------                               -----------         ------                     Light Blue Top[[id number]]                                   Final result               Green Top (Gel)[[id number]]                                  Final result               Lavender Top[[id number]]                                     Final result               Red Top[[id number]]                                          Final result                 Please view results for these tests on the individual orders.    Light Blue Top [[id number]] Collected:  03/06/18 1503    Specimen:  Blood from Arm, Right Updated:  03/06/18 1616     Extra Tube hold for add-on      Auto resulted       Green Top (Gel) [[id number]] Collected:  03/06/18 1503    Specimen:  Blood from Arm, Right Updated:  03/06/18 1616     Extra Tube Hold for add-ons.      Auto resulted.       Lavender Top [[id number]] Collected:  03/06/18 1503    Specimen:  Blood from Arm, Right Updated:  03/06/18 1616     Extra Tube hold for add-on      Auto resulted       Red Top [[id number]] Collected:  03/06/18 1505    Specimen:  Blood from Arm, Right Updated:  03/06/18 1616     Extra Tube Hold for add-ons.      Auto resulted.       Urinalysis With / Microscopic If Indicated - Urine, Catheter [[id number]]  (Normal) Collected:  03/06/18 1719    Specimen:  Urine from Urine, Catheter Updated:  03/06/18 1747     Color, UA Yellow     Appearance, UA Clear     pH, UA 7.0     Specific Gravity, UA 1.007     Glucose, UA Negative     Ketones, UA Negative     Bilirubin, UA Negative     Blood, UA Negative     Protein, UA Negative     Leuk Esterase, UA Negative     Nitrite, UA Negative     Urobilinogen, UA 1.0 E.U./dL    Narrative:       Urine microscopic not indicated.    Occult Blood X 1, Stool - Stool, Per Rectum [[id number]]  (Normal) Collected:  03/06/18 1814    Specimen:  Stool from Per Rectum Updated:   03/06/18 1825     Fecal Occult Blood Negative    CBC & Differential [[id number]] Collected:  03/06/18 1909    Specimen:  Blood Updated:  03/06/18 1933    Narrative:       The following orders were created for panel order CBC & Differential.  Procedure                               Abnormality         Status                     ---------                               -----------         ------                     CBC Auto Differential[[id number]]        Abnormal            Final result                 Please view results for these tests on the individual orders.    CBC Auto Differential [[id number]]  (Abnormal) Collected:  03/06/18 1909    Specimen:  Blood Updated:  03/06/18 1933     WBC 4.76 (L) 10*3/mm3      RBC 2.77 (L) 10*6/mm3      Hemoglobin 8.6 (L) g/dL      Hematocrit 25.1 (L) %      MCV 90.6 fL      MCH 31.0 pg      MCHC 34.3 g/dL      RDW 14.6 %      RDW-SD 47.5 fl      MPV 9.5 fL      Platelets 200 10*3/mm3      Neutrophil % 75.2 %      Lymphocyte % 16.6 %      Monocyte % 5.7 %      Eosinophil % 1.5 %      Basophil % 0.2 %      Immature Grans % 0.8 %      Neutrophils, Absolute 3.58 10*3/mm3      Lymphocytes, Absolute 0.79 10*3/mm3      Monocytes, Absolute 0.27 10*3/mm3      Eosinophils, Absolute 0.07 10*3/mm3      Basophils, Absolute 0.01 10*3/mm3      Immature Grans, Absolute 0.04 (H) 10*3/mm3      nRBC 0.0 /100 WBC     Basic Metabolic Panel [[id number]]  (Abnormal) Collected:  03/06/18 1909    Specimen:  Blood Updated:  03/06/18 1943     Glucose 152 (H) mg/dL      BUN 32 (H) mg/dL      Creatinine 1.58 (H) mg/dL      Sodium 135 mmol/L      Potassium 3.6 mmol/L      Chloride 96 (L) mmol/L      CO2 29.0 mmol/L      Calcium 7.9 (L) mg/dL      eGFR Non African Amer 44 (L) mL/min/1.73      BUN/Creatinine Ratio 20.3     Anion Gap 10.0 mmol/L     Narrative:       GFR Normal >60  Chronic Kidney Disease <60  Kidney Failure <15    CBC & Differential [[id number]] Collected:  03/07/18 0406    Specimen:  Blood Updated:   03/07/18 0439    Narrative:       The following orders were created for panel order CBC & Differential.  Procedure                               Abnormality         Status                     ---------                               -----------         ------                     CBC Auto Differential[[id number]]        Abnormal            Final result                 Please view results for these tests on the individual orders.    CBC Auto Differential [[id number]]  (Abnormal) Collected:  03/07/18 0406    Specimen:  Blood Updated:  03/07/18 0439     WBC 4.65 (L) 10*3/mm3      RBC 3.04 (L) 10*6/mm3      Hemoglobin 9.5 (L) g/dL      Hematocrit 27.0 (L) %      MCV 88.8 fL      MCH 31.3 pg      MCHC 35.2 g/dL      RDW 14.8 %      RDW-SD 46.5 fl      MPV 9.4 fL      Platelets 172 10*3/mm3      Neutrophil % 62.3 %      Lymphocyte % 24.9 %      Monocyte % 8.8 %      Eosinophil % 2.8 %      Basophil % 0.6 %      Immature Grans % 0.6 %      Neutrophils, Absolute 2.89 10*3/mm3      Lymphocytes, Absolute 1.16 10*3/mm3      Monocytes, Absolute 0.41 10*3/mm3      Eosinophils, Absolute 0.13 10*3/mm3      Basophils, Absolute 0.03 10*3/mm3      Immature Grans, Absolute 0.03 10*3/mm3      nRBC 0.0 /100 WBC     Basic Metabolic Panel [[id number]]  (Abnormal) Collected:  03/07/18 0406    Specimen:  Blood Updated:  03/07/18 0455     Glucose 97 mg/dL      BUN 31 (H) mg/dL      Creatinine 1.56 (H) mg/dL      Sodium 134 (L) mmol/L      Potassium 3.7 mmol/L      Chloride 99 mmol/L      CO2 28.0 mmol/L      Calcium 8.0 (L) mg/dL      eGFR Non African Amer 45 (L) mL/min/1.73      BUN/Creatinine Ratio 19.9     Anion Gap 7.0 mmol/L     Narrative:       GFR Normal >60  Chronic Kidney Disease <60  Kidney Failure <15          No results found for: ECHOEFEST    Imaging Results (last 72 hours)     Procedure Component Value Units Date/Time    XR Chest 1 View [[id number]] Collected:  03/06/18 1604     Updated:  03/06/18 1610    Narrative:        History:  67-year-old with SVT.     Reference:  Chest radiograph April 7, 2017.     Findings:  Frontal chest radiograph performed.     Cardiomegaly. There is left basilar opacity likely representing  combination of elevated hemidiaphragm, effusion and airspace opacity.  Upper left lung zone is clear. Right lung is clear. No pneumothorax.  Degenerative changes of the spine. Remote left-sided rib fractures are  noted.          Impression:       Left basilar opacity could represent combination of diaphragm elevation,  small pleural effusion, and nonspecific airspace disease. CT could  differentiate if needed.  This report was finalized on 03/06/2018 16:07 by  Kev Montano, .        Assessment/Plan     Principal Problem:    SVT (supraventricular tachycardia)  Active Problems:    Odontoid fracture with type II morphology    Hypotension likely secondary to drugs vs dehydration    PAF (paroxysmal atrial fibrillation)    Anemia    Chronic kidney disease, stage III (moderate)    Plan:  Continue to monitor telemetry   2 brief runs of what appears to be SVT  Hypotension improving some  Monitor H&H- stable today  Chief complaint today is constipation- primary addressing  D-dimer 2.36 defer to primary for work up  Check Echo    Further orders per Dr. Dixon    Thank you for asking us to follow this patient with you.     Guillermo Vela, APRN  03/07/18  11:35 AM

## 2018-03-07 NOTE — PLAN OF CARE
Problem: Patient Care Overview (Adult)  Goal: Plan of Care Review  Outcome: Ongoing (interventions implemented as appropriate)   03/06/18 6217   Coping/Psychosocial Response Interventions   Plan Of Care Reviewed With patient   Patient Care Overview   Progress no change   Outcome Evaluation   Outcome Summary/Follow up Plan Pt admitted from ED. Upon arrival, BP was 83/51 and HR was low 100's. Md aware.. Hgb 7.9 and blood was drawn for type and cross. Pt O2 sat stable on room air. Dressing on pt posterior neck CDI and cervical collar in place. F/C present on admission and draining well at bedside. Pt c/o pain x1 in his neck. Patient has not had a BM in a week, Md aware. Will continue to monitor labs and vital signs.      Goal: Adult Individualization and Mutuality  Outcome: Ongoing (interventions implemented as appropriate)    Goal: Discharge Needs Assessment  Outcome: Ongoing (interventions implemented as appropriate)      Problem: Arrhythmia/Dysrhythmia (Symptomatic) (Adult)  Goal: Signs and Symptoms of Listed Potential Problems Will be Absent or Manageable (Arrhythmia/Dysrhythmia)  Outcome: Ongoing (interventions implemented as appropriate)      Problem: Fall Risk (Adult)  Goal: Identify Related Risk Factors and Signs and Symptoms  Outcome: Outcome(s) achieved Date Met: 03/06/18    Goal: Absence of Falls  Outcome: Ongoing (interventions implemented as appropriate)      Problem: Pressure Ulcer Risk (Hebert Scale) (Adult,Obstetrics,Pediatric)  Goal: Identify Related Risk Factors and Signs and Symptoms  Outcome: Outcome(s) achieved Date Met: 03/06/18    Goal: Skin Integrity  Outcome: Ongoing (interventions implemented as appropriate)

## 2018-03-07 NOTE — DISCHARGE SUMMARY
[facility] Medicine Services  DISCHARGE SUMMARY       Date of Admission: 3/6/2018  Date of Discharge:  3/7/2018  Primary Care Physician: Brain Barreto MD    Discharge Diagnoses:  Patient Active Problem List   Diagnosis   • Odontoid fracture with type II morphology   • SVT (supraventricular tachycardia)   • Hypotension due to drugs   • PAF (paroxysmal atrial fibrillation)   • Anemia   • Chronic kidney disease, stage III (moderate)         Presenting Problem/History of Present Illness:  SVT (supraventricular tachycardia) [I47.1]     Chief Complaint on Day of Discharge:   No complaints today    History of Present Illness on Day of Discharge:   Cardiology has evaluated the patient and felt that the patient could be discharged on beta blockers if his echocardiogram was within normal limits.  The echocardiogram report returned 30 minutes ago and is unremarkable.    Hospital Course  Patient is a 67 y.o. male states that at approximately 3 PM on the day prior to discharge he was being visited by a home health nurse.  He became lightheaded, developed a rapid heart rate, became very weak and passed out.  He was witnessed to have simply slid out of his wheelchair onto the floor.  His blood pressure was in the 80s over 60s at the time.  He was subsequently transported to [facility] emergency Department [place] was noted to have a heart rate in the upper 170s.  Initial EKG was consistent with SVT and he was given 6 mg of adenosine without response.  He was then given 12 mg of adenosine and with response of the short duration of less than 1 minute.  The patient was subsequently cardioverted at 150 J where upon a sinus rhythm was obtained.  The patient was admitted thereafter for observation and treatment.  Notably the patient has been taking an increased amount of pain medication after recently having a surgical stabilization of an odontoid fracture.  Hemoglobin was noted to be low at 7.9.   Creatinine 1.58 with an estimated GFR of 44.  The patient was given 1 L fluid bolus in the emergency department but has been completely asymptomatic since that time.  Since arriving to the medical surgical floor the patient has been requesting pain medications frequently.   PLAN:   Admit to telemetry  Type cross and infuse 2 units packed red blood cells followed by hemoglobin and hematocrit  Hold pain medications secondary to hypotension  Relistor for constipation.    After 2 units of blood the patient's hemoglobin had increased to 9.5.  His renal function remained stable.  White blood cell count was within normal limits.  The patient had a 3 or 4 beat run of supraventricular tachycardia which quickly resolved.  He was placed on 12.5 mg of metoprolol every 12 hours and cardiology was consulted.  The patient had remained in normal sinus rhythm and cardiology felt the patient was stable for discharge as long as echocardiogram was within normal limits.  Approximately 30 minutes ago the echocardiogram report returned showing no acute changes and the patient was felt to be stable for discharge by both services.    Consults:   Cardiology  ASSESSMENT/PLAN:     1.  Paroxysmal supraventricular tachycardia.  The patient's EKG is narrow complex and regular.  Her some features that appear consistent with AVNRT however also must consider 2:1 atrial flutter.  In any event, he has been in normal sinus rhythm throughout the day today.  Long-term, even if this patient does have a history of atrial fibrillation or atrial flutter, it would not necessarily be wise to place him on anticoagulation at this time with his anemia upon arrival and his recent operative status.  2.  Paroxysmal atrial fibrillation: As stated in the HPI, this patient adamantly denies that he has ever had any form of atrial arrhythmia although previous office notes indicate that he had atrial fibrillation at least at one point in time.  As stated, with anemia that  was present on arrival, he is not thought to be a suitable candidate for anticoagulation at this time.  3.  Hypotension on arrival: The patient's hypotension is likely multifactorial, in the setting of his atrial arrhythmia, poor by mouth intake, potential dehydration.  Blood pressure currently stable.  4.  Anemia: Blood counts currently stable after transfusion.  Workup per the primary service.  5.  Stage III chronic kidney disease  6.  Odontoid fracture with type II morphology  7.  Syncope: Potentially multifactorial due to problem #1, 3, 4  8.  Constipation     - The patient remains in sinus rhythm at this time.  Metoprolol 12.5 mg daily has been ordered.  As stated, I would not pursue anticoagulation, given uncertainties about the etiology of the rhythm (whether this was atrial flutter versus SVT) and certainly not in the setting of his anemia and recent operative status.  - An echocardiogram has been ordered.  We will finalize the results when available.  - Now that the patient is back in normal sinus rhythm, no further cardiac workup other than the echocardiogram will be performed at this time.  - I would recommend follow-up in 4 weeks after discharge with our nurse practitioner clinic to reevaluate symptoms and heart rhythm at that time.  - From a pure cardiac perspective, the patient is likely stable for discharge at this time as long as the echocardiogram shows no significant findings.        Pertinent Test Results:   Echocardiogram  Interpretation Summary   · The study is technically difficult for diagnosis.  · Left ventricular systolic function is normal. Estimated EF appears to be in the range of 56 - 60%.  · No significant valvular abnormalities noted.      Specimen:  Blood from Arm, Right Updated:  03/06/18 1548     WBC 4.33 (L) 10*3/mm3      RBC 2.54 (L) 10*6/mm3      Hemoglobin 7.9 (L) g/dL      Hematocrit 22.7 (L) %      MCV 89.4 fL      MCH 31.1 pg      MCHC 34.8 g/dL      RDW 14.6 %      RDW-SD  47.0 fl      MPV 9.5 fL      Platelets 190 10*3/mm3      Neutrophil % 67.5 %      Lymphocyte % 21.7 %      Monocyte % 8.3 %      Eosinophil % 1.8 %      Basophil % 0.2 %      Immature Grans % 0.5 %      Neutrophils, Absolute 2.92 10*3/mm3      Lymphocytes, Absolute 0.94 10*3/mm3      Monocytes, Absolute 0.36 10*3/mm3      Eosinophils, Absolute 0.08 10*3/mm3      Basophils, Absolute 0.01 10*3/mm3      Immature Grans, Absolute 0.02 10*3/mm3      nRBC 0.0 /100 WBC     Protime-INR [[id number]]  (Abnormal) Collected:  03/06/18 1503    Specimen:  Blood from Arm, Right Updated:  03/06/18 1548     Protime 15.6 (H) Seconds      INR 1.20 (H)    aPTT [[id number]]  (Abnormal) Collected:  03/06/18 1503    Specimen:  Blood from Arm, Right Updated:  03/06/18 1548     PTT 36.5 (H) seconds     D-dimer, Quantitative [[id number]]  (Abnormal) Collected:  03/06/18 1503    Specimen:  Blood from Arm, Right Updated:  03/06/18 1549     D-Dimer, Quantitative 2.36 (H) mg/L (FEU)     Narrative:       Reference Range is 0-0.50 mg/L FEU. However, results <0.50 mg/L FEU tends to rule out DVT or PE. Results >0.50 mg/L FEU are not useful in predicting absence or presence of DVT or PE.    Comprehensive Metabolic Panel [[id number]]  (Abnormal) Collected:  03/06/18 1503    Specimen:  Blood from Arm, Right Updated:  03/06/18 1550     Glucose 124 (H) mg/dL      BUN 33 (H) mg/dL      Creatinine 1.58 (H) mg/dL      Sodium 134 (L) mmol/L      Potassium 4.1 mmol/L      Chloride 95 (L) mmol/L      CO2 32.0 (H) mmol/L      Calcium 7.8 (L) mg/dL      Total Protein 5.5 (L) g/dL      Albumin 2.70 (L) g/dL      ALT (SGPT) 28 U/L      AST (SGOT) 29 U/L      Alkaline Phosphatase 87 U/L      Total Bilirubin 0.4 mg/dL      eGFR Non African Amer 44 (L) mL/min/1.73      Globulin 2.8 gm/dL      A/G Ratio 1.0 (L) g/dL      BUN/Creatinine Ratio 20.9     Anion Gap 7.0 mmol/L     Lipase [[id number]]  (Normal) Collected:  03/06/18 1503    Specimen:  Blood from Arm, Right Updated:   03/06/18 1550     Lipase 47 U/L     Magnesium [[id number]]  (Normal) Collected:  03/06/18 1503    Specimen:  Blood from Arm, Right Updated:  03/06/18 1550     Magnesium 1.8 mg/dL     BNP [[id number]]  (Abnormal) Collected:  03/06/18 1503    Specimen:  Blood from Arm, Right Updated:  03/06/18 1554     proBNP 1560.0 (H) pg/mL     Troponin [[id number]]  (Normal) Collected:  03/06/18 1503    Specimen:  Blood from Arm, Right Updated:  03/06/18 1558     Troponin I 0.017 ng/mL     T4, Free [[id number]]  (Normal) Collected:  03/06/18 1505    Specimen:  Blood from Arm, Right Updated:  03/06/18 1559     Free T4 1.81 ng/dL     TSH [[id number]]  (Normal) Collected:  03/06/18 1505    Specimen:  Blood from Arm, Right Updated:  03/06/18 1613     TSH 1.310 mIU/mL     Urinalysis With / Microscopic If Indicated - Urine, Catheter [[id number]]  (Normal) Collected:  03/06/18 1719    Specimen:  Urine from Urine, Catheter Updated:  03/06/18 1747     Color, UA Yellow     Appearance, UA Clear     pH, UA 7.0     Specific Gravity, UA 1.007     Glucose, UA Negative     Ketones, UA Negative     Bilirubin, UA Negative     Blood, UA Negative     Protein, UA Negative     Leuk Esterase, UA Negative     Nitrite, UA Negative     Urobilinogen, UA 1.0 E.U./dL    Narrative:       Urine microscopic not indicated.    Occult Blood X 1, Stool - Stool, Per Rectum [[id number]]  (Normal) Collected:  03/06/18 1814    Specimen:  Stool from Per Rectum Updated:  03/06/18 1825     Fecal Occult Blood Negative    CBC Auto Differential [[id number]]  (Abnormal) Collected:  03/06/18 1909    Specimen:  Blood Updated:  03/06/18 1933     WBC 4.76 (L) 10*3/mm3      RBC 2.77 (L) 10*6/mm3      Hemoglobin 8.6 (L) g/dL      Hematocrit 25.1 (L) %      MCV 90.6 fL      MCH 31.0 pg      MCHC 34.3 g/dL      RDW 14.6 %      RDW-SD 47.5 fl      MPV 9.5 fL      Platelets 200 10*3/mm3      Neutrophil % 75.2 %      Lymphocyte % 16.6 %      Monocyte % 5.7 %      Eosinophil % 1.5 %      Basophil  % 0.2 %      Immature Grans % 0.8 %      Neutrophils, Absolute 3.58 10*3/mm3      Lymphocytes, Absolute 0.79 10*3/mm3      Monocytes, Absolute 0.27 10*3/mm3      Eosinophils, Absolute 0.07 10*3/mm3      Basophils, Absolute 0.01 10*3/mm3      Immature Grans, Absolute 0.04 (H) 10*3/mm3      nRBC 0.0 /100 WBC     Basic Metabolic Panel [[id number]]  (Abnormal) Collected:  03/06/18 1909    Specimen:  Blood Updated:  03/06/18 1943     Glucose 152 (H) mg/dL      BUN 32 (H) mg/dL      Creatinine 1.58 (H) mg/dL      Sodium 135 mmol/L      Potassium 3.6 mmol/L      Chloride 96 (L) mmol/L      CO2 29.0 mmol/L      Calcium 7.9 (L) mg/dL      eGFR Non African Amer 44 (L) mL/min/1.73      BUN/Creatinine Ratio 20.3     Anion Gap 10.0 mmol/L     CBC Auto Differential [[id number]]  (Abnormal) Collected:  03/07/18 0406    Specimen:  Blood Updated:  03/07/18 0439     WBC 4.65 (L) 10*3/mm3      RBC 3.04 (L) 10*6/mm3      Hemoglobin 9.5 (L) g/dL      Hematocrit 27.0 (L) %      MCV 88.8 fL      MCH 31.3 pg      MCHC 35.2 g/dL      RDW 14.8 %      RDW-SD 46.5 fl      MPV 9.4 fL      Platelets 172 10*3/mm3      Neutrophil % 62.3 %      Lymphocyte % 24.9 %      Monocyte % 8.8 %      Eosinophil % 2.8 %      Basophil % 0.6 %      Immature Grans % 0.6 %      Neutrophils, Absolute 2.89 10*3/mm3      Lymphocytes, Absolute 1.16 10*3/mm3      Monocytes, Absolute 0.41 10*3/mm3      Eosinophils, Absolute 0.13 10*3/mm3      Basophils, Absolute 0.03 10*3/mm3      Immature Grans, Absolute 0.03 10*3/mm3      nRBC 0.0 /100 WBC     Basic Metabolic Panel [[id number]]  (Abnormal) Collected:  03/07/18 0406    Specimen:  Blood Updated:  03/07/18 0455     Glucose 97 mg/dL      BUN 31 (H) mg/dL      Creatinine 1.56 (H) mg/dL      Sodium 134 (L) mmol/L      Potassium 3.7 mmol/L      Chloride 99 mmol/L      CO2 28.0 mmol/L      Calcium 8.0 (L) mg/dL      eGFR Non African Amer 45 (L) mL/min/1.73      BUN/Creatinine Ratio 19.9     Anion Gap 7.0 mmol/L     Narrative:    "    GFR Normal >60  Chronic Kidney Disease <60  Kidney Failure <15        Imaging Results (last 7 days)     Procedure Component Value Units Date/Time    XR Chest 1 View [[id number]] Collected:  03/06/18 1604     Updated:  03/06/18 1610    Narrative:       History:  67-year-old with SVT.     Reference:  Chest radiograph April 7, 2017.     Findings:  Frontal chest radiograph performed.     Cardiomegaly. There is left basilar opacity likely representing  combination of elevated hemidiaphragm, effusion and airspace opacity.  Upper left lung zone is clear. Right lung is clear. No pneumothorax.  Degenerative changes of the spine. Remote left-sided rib fractures are  noted.          Impression:       Left basilar opacity could represent combination of diaphragm elevation,  small pleural effusion, and nonspecific airspace disease. CT could  differentiate if needed.  This report was finalized on 03/06/2018 16:07 by  Kev Montano, .            Condition on Discharge:    Stable    Physical Exam on Discharge:  /74 (BP Location: Left arm, Patient Position: Lying)  Pulse 70  Temp 97.4 °F (36.3 °C) (Temporal Artery )   Resp 18  Ht 177.8 cm (70\")  Wt 76.4 kg (168 lb 6.4 oz)  SpO2 98%  BMI 24.16 kg/m2  Physical Exam  Constitutional: He is oriented to person, place, and time. He appears well-developed and well-nourished. No distress.   HENT:   Head: Normocephalic and atraumatic.   Right Ear: External ear normal.   Left Ear: External ear normal.   Nose: Nose normal.   Mouth/Throat: Oropharynx is clear and moist.   Eyes: Conjunctivae and EOM are normal. Pupils are equal, round, and reactive to light. No scleral icterus.   Neck:   Postoperative surgical stabilization collar in place   Cardiovascular: Normal rate, regular rhythm, normal heart sounds and intact distal pulses.    No murmur heard.  Pulmonary/Chest: Effort normal and breath sounds normal. No respiratory distress. He has no wheezes.   Abdominal: Soft. Bowel sounds " are normal. He exhibits no distension and no mass. There is no tenderness.   Musculoskeletal: Normal range of motion. He exhibits no edema or tenderness.   Neurological: He is alert and oriented to person, place, and time. He has normal reflexes. No cranial nerve deficit.   Skin: Skin is warm and dry.   Psychiatric: His behavior is normal. Judgment and thought content normal. His affect is blunt.     Discharge Disposition:  Home or Self Care    Discharge Medications:   Vikas Cruz   Home Medication Instructions YAMILET:[id number]    Printed on:03/07/18 0719   Medication Information                      ALPRAZolam (XANAX) 1 MG tablet  Take 1 mg by mouth 3 (Three) Times a Day As Needed for Anxiety.             atorvastatin (LIPITOR) 40 MG tablet  Take 40 mg by mouth Daily.             carisoprodol (SOMA) 350 MG tablet  Take 1 tablet by mouth Every 8 (Eight) Hours As Needed for Muscle Spasms for up to 8 days.             gabapentin (NEURONTIN) 300 MG capsule  Take 900 mg by mouth Every Night.             meloxicam (MOBIC) 15 MG tablet  Take 15 mg by mouth Daily.             metoprolol tartrate (LOPRESSOR) 25 MG tablet  Take 0.5 tablets by mouth Every 12 (Twelve) Hours.             oxyCODONE (ROXICODONE) 20 MG tablet  Take 1 tablet by mouth Every 8 (Eight) Hours As Needed for Moderate Pain .             tamsulosin (FLOMAX) 0.4 MG capsule 24 hr capsule  Take 1 capsule by mouth Daily.             zolpidem (AMBIEN) 10 MG tablet  Take 10 mg by mouth At Night As Needed for Sleep.                 Discharge Diet:   Diet Instructions     Diet: Regular; Thin       Discharge Diet:  Regular   Fluid Consistency:  Thin                 Discharge Care Plan / Instructions:   Discharge home    Activity at Discharge:   Activity Instructions     Activity as Tolerated                     Follow-up Appointments:  Follow-up with urology tomorrow for catheter removal  Follow-up with primary care physician next week       Hany Scott,  DO  03/07/18  5:35 PM    Time: Discharge Less than 30 min    Please note that portions of this note may have been completed with a voice recognition program. Efforts were made to edit the dictations, but occasionally words are mistranscribed.

## 2018-03-07 NOTE — PLAN OF CARE
Problem: Patient Care Overview (Adult)  Goal: Plan of Care Review  Outcome: Ongoing (interventions implemented as appropriate)   03/07/18 9098   Coping/Psychosocial Response Interventions   Plan Of Care Reviewed With patient   Patient Care Overview   Progress no change   Outcome Evaluation   Outcome Summary/Follow up Plan Pt c/o neck and throat pain throughout the day. Medicated x1 for neck with good relief and used chloraseptic spray for throat with goof relief. BP has remained stable. S/ST on tele. Pt did have a few seconds of SVT this shift and MD aware. Pt had a moderate BM today after getting a suppository. F/C to bedside drainage with good UOP. Cervical dressing CDI and collar in place. Pt ambulated to [facility] with assistx1. Will continue to monitor.      Goal: Adult Individualization and Mutuality  Outcome: Ongoing (interventions implemented as appropriate)    Goal: Discharge Needs Assessment  Outcome: Ongoing (interventions implemented as appropriate)      Problem: Arrhythmia/Dysrhythmia (Symptomatic) (Adult)  Goal: Signs and Symptoms of Listed Potential Problems Will be Absent or Manageable (Arrhythmia/Dysrhythmia)  Outcome: Ongoing (interventions implemented as appropriate)      Problem: Fall Risk (Adult)  Goal: Absence of Falls  Outcome: Ongoing (interventions implemented as appropriate)      Problem: Pressure Ulcer Risk (Hebert Scale) (Adult,Obstetrics,Pediatric)  Goal: Skin Integrity  Outcome: Ongoing (interventions implemented as appropriate)

## 2018-03-07 NOTE — PLAN OF CARE
Problem: Patient Care Overview (Adult)  Goal: Plan of Care Review  Outcome: Ongoing (interventions implemented as appropriate)   03/07/18 9037   Coping/Psychosocial Response Interventions   Plan Of Care Reviewed With patient   Patient Care Overview   Progress progress toward functional goals as expected   Outcome Evaluation   Outcome Summary/Follow up Plan Patient received 2 units PRBC with follow up H/H improved. Patient BP improved. Patient stable on Room air. Dressing posterior cervical neck CDI. Jimenez cath care done and jimenez draining well to bedside. Pt c/o difficulty getting comfortable. Gave pain meds once bp was elevated enough. Continue to monitor.      Goal: Adult Individualization and Mutuality  Outcome: Ongoing (interventions implemented as appropriate)    Goal: Discharge Needs Assessment  Outcome: Ongoing (interventions implemented as appropriate)      Problem: Arrhythmia/Dysrhythmia (Symptomatic) (Adult)  Goal: Signs and Symptoms of Listed Potential Problems Will be Absent or Manageable (Arrhythmia/Dysrhythmia)  Outcome: Ongoing (interventions implemented as appropriate)      Problem: Fall Risk (Adult)  Goal: Absence of Falls  Outcome: Ongoing (interventions implemented as appropriate)      Problem: Pressure Ulcer Risk (Hebert Scale) (Adult,Obstetrics,Pediatric)  Goal: Skin Integrity  Outcome: Ongoing (interventions implemented as appropriate)

## 2018-03-07 NOTE — NURSING NOTE
Discharge Planning Assessment   [facility]     Patient Name: Vikas Cruz  MRN: [id number]  Today's Date: 3/7/2018    Admit Date: 3/6/2018          Discharge Needs Assessment     None            Discharge Plan       03/07/18 1526    Case Management/Social Work Plan    Plan Attempted to speak with patient about discharge plan but was off floor for ECHO. Will follow later.        Discharge Placement     No information found                Demographic Summary     None            Functional Status     None            Psychosocial     None            Abuse/Neglect     None            Legal     None            Substance Abuse     None            Patient Forms     None          Merlina A Fletcher, RN

## 2018-03-07 NOTE — PLAN OF CARE
Problem: Patient Care Overview (Adult)  Goal: Plan of Care Review  Outcome: Ongoing (interventions implemented as appropriate)   03/07/18 1524   Coping/Psychosocial Response Interventions   Plan Of Care Reviewed With patient   Patient Care Overview   Progress no change   Outcome Evaluation   Outcome Summary/Follow up Plan Predicted suboptimal nutrient intake r/t recent poor appetite following spinal fusion surgery. Per record review, pt has lost ~13# in the last month. He reports a declined intake. Current intake has avg'd 25% of the last 2 meals in the hospital. He reports his appetite is a little better. Would recommend to try Ensure Enlive BID to supplement currrent intake. Pt may benefit from supplementation at home if appetite does not improve.

## 2018-03-08 ENCOUNTER — OFFICE VISIT (OUTPATIENT)
Dept: UROLOGY | Facility: CLINIC | Age: 68
End: 2018-03-08

## 2018-03-08 ENCOUNTER — TELEPHONE (OUTPATIENT)
Dept: NEUROSURGERY | Facility: CLINIC | Age: 68
End: 2018-03-08

## 2018-03-08 VITALS
TEMPERATURE: 98.3 F | SYSTOLIC BLOOD PRESSURE: 130 MMHG | HEIGHT: 70 IN | WEIGHT: 169.6 LBS | BODY MASS INDEX: 24.28 KG/M2 | DIASTOLIC BLOOD PRESSURE: 60 MMHG

## 2018-03-08 DIAGNOSIS — R33.9 URINARY RETENTION: ICD-10-CM

## 2018-03-08 DIAGNOSIS — R33.8 BENIGN PROSTATIC HYPERPLASIA WITH URINARY RETENTION: Primary | ICD-10-CM

## 2018-03-08 DIAGNOSIS — N40.1 BENIGN PROSTATIC HYPERPLASIA WITH URINARY RETENTION: Primary | ICD-10-CM

## 2018-03-08 LAB
ABO + RH BLD: NORMAL
ABO + RH BLD: NORMAL
BH BB BLOOD EXPIRATION DATE: NORMAL
BH BB BLOOD EXPIRATION DATE: NORMAL
BH BB BLOOD TYPE BARCODE: 6200
BH BB BLOOD TYPE BARCODE: 6200
BH BB DISPENSE STATUS: NORMAL
BH BB DISPENSE STATUS: NORMAL
BH BB PRODUCT CODE: NORMAL
BH BB PRODUCT CODE: NORMAL
BH BB UNIT NUMBER: NORMAL
BH BB UNIT NUMBER: NORMAL
UNIT  ABO: NORMAL
UNIT  ABO: NORMAL
UNIT  RH: NORMAL
UNIT  RH: NORMAL

## 2018-03-08 PROCEDURE — 99204 OFFICE O/P NEW MOD 45 MIN: CPT | Performed by: UROLOGY

## 2018-03-08 NOTE — TELEPHONE ENCOUNTER
Received call from Roselyn Gilliland, home health nurse with [facility]. Due to the patient's recent admission they are needing a resumption of care to continue with home health assessments. I have given verbal okay (original order was placed on discharge 03/02/18 but patient came back in for syncopal episode at [facility]). She voiced understanding.

## 2018-03-08 NOTE — PROGRESS NOTES
Pt was d/c'ed home yesterday. Found out he does have [facility]. Gave Roselynxochitl Gilliland with [facility] his d/c information to resume his care.

## 2018-03-09 ENCOUNTER — OFFICE VISIT (OUTPATIENT)
Dept: FAMILY MEDICINE CLINIC | Age: 68
End: 2018-03-09
Payer: MEDICARE

## 2018-03-09 VITALS
HEIGHT: 70 IN | HEART RATE: 80 BPM | SYSTOLIC BLOOD PRESSURE: 114 MMHG | DIASTOLIC BLOOD PRESSURE: 68 MMHG | TEMPERATURE: 98.3 F | RESPIRATION RATE: 16 BRPM | OXYGEN SATURATION: 98 %

## 2018-03-09 DIAGNOSIS — I95.9 HYPOTENSION, UNSPECIFIED HYPOTENSION TYPE: ICD-10-CM

## 2018-03-09 DIAGNOSIS — I47.1 SVT (SUPRAVENTRICULAR TACHYCARDIA) (HCC): ICD-10-CM

## 2018-03-09 DIAGNOSIS — R63.0 POOR APPETITE: ICD-10-CM

## 2018-03-09 DIAGNOSIS — I10 ESSENTIAL HYPERTENSION: ICD-10-CM

## 2018-03-09 DIAGNOSIS — R33.9 URINARY RETENTION: ICD-10-CM

## 2018-03-09 DIAGNOSIS — S12.001S CLOSED NONDISPLACED FRACTURE OF FIRST CERVICAL VERTEBRA, UNSPECIFIED FRACTURE MORPHOLOGY, SEQUELA: Primary | ICD-10-CM

## 2018-03-09 LAB
ALBUMIN SERPL-MCNC: 3 G/DL (ref 3.5–5.2)
ALP BLD-CCNC: 97 U/L (ref 40–130)
ALT SERPL-CCNC: 9 U/L (ref 5–41)
ANION GAP SERPL CALCULATED.3IONS-SCNC: 9 MMOL/L (ref 7–19)
AST SERPL-CCNC: 14 U/L (ref 5–40)
BACTERIA: ABNORMAL /HPF
BILIRUB SERPL-MCNC: 0.7 MG/DL (ref 0.2–1.2)
BILIRUBIN URINE: NEGATIVE
BLOOD, URINE: NEGATIVE
BUN BLDV-MCNC: 21 MG/DL (ref 8–23)
CALCIUM SERPL-MCNC: 8.4 MG/DL (ref 8.8–10.2)
CASTS: ABNORMAL /LPF
CHLORIDE BLD-SCNC: 96 MMOL/L (ref 98–111)
CHOLESTEROL, TOTAL: 117 MG/DL (ref 160–199)
CLARITY: ABNORMAL
CO2: 31 MMOL/L (ref 22–29)
COLOR: ABNORMAL
CREAT SERPL-MCNC: 1.3 MG/DL (ref 0.5–1.2)
EPITHELIAL CELLS, UA: ABNORMAL /HPF
GFR NON-AFRICAN AMERICAN: 55
GLUCOSE BLD-MCNC: 108 MG/DL (ref 74–109)
GLUCOSE URINE: NEGATIVE MG/DL
HCT VFR BLD CALC: 30.6 % (ref 42–52)
HDLC SERPL-MCNC: 32 MG/DL (ref 55–121)
HEMOGLOBIN: 10.3 G/DL (ref 14–18)
KETONES, URINE: NEGATIVE MG/DL
LDL CHOLESTEROL CALCULATED: 63 MG/DL
LEUKOCYTE ESTERASE, URINE: ABNORMAL
MCH RBC QN AUTO: 31.7 PG (ref 27–31)
MCHC RBC AUTO-ENTMCNC: 33.7 G/DL (ref 33–37)
MCV RBC AUTO: 94.2 FL (ref 80–94)
NITRITE, URINE: NEGATIVE
PDW BLD-RTO: 15.6 % (ref 11.5–14.5)
PH UA: 6
PLATELET # BLD: 233 K/UL (ref 130–400)
PMV BLD AUTO: 9 FL (ref 9.4–12.4)
POTASSIUM SERPL-SCNC: 4.1 MMOL/L (ref 3.5–5)
PROSTATE SPECIFIC ANTIGEN: 12.2 NG/ML (ref 0–4)
PROTEIN UA: ABNORMAL MG/DL
RBC # BLD: 3.25 M/UL (ref 4.7–6.1)
RBC UA: ABNORMAL /HPF (ref 0–2)
SODIUM BLD-SCNC: 136 MMOL/L (ref 136–145)
SPECIFIC GRAVITY UA: 1.02
TOTAL PROTEIN: 6 G/DL (ref 6.6–8.7)
TRIGL SERPL-MCNC: 109 MG/DL (ref 0–149)
TSH SERPL DL<=0.05 MIU/L-ACNC: 1.29 UIU/ML (ref 0.27–4.2)
UROBILINOGEN, URINE: 1 E.U./DL
WBC # BLD: 5.8 K/UL (ref 4.8–10.8)
WBC UA: ABNORMAL /HPF (ref 0–5)

## 2018-03-09 PROCEDURE — 96372 THER/PROPH/DIAG INJ SC/IM: CPT | Performed by: FAMILY MEDICINE

## 2018-03-09 PROCEDURE — 99214 OFFICE O/P EST MOD 30 MIN: CPT | Performed by: FAMILY MEDICINE

## 2018-03-09 RX ORDER — MIRTAZAPINE 7.5 MG/1
7.5 TABLET, FILM COATED ORAL NIGHTLY
Qty: 30 TABLET | Refills: 5 | Status: SHIPPED | OUTPATIENT
Start: 2018-03-09 | End: 2018-03-21 | Stop reason: ALTCHOICE

## 2018-03-09 RX ORDER — SODIUM CHLORIDE 9 MG/ML
INJECTION, SOLUTION INTRAVENOUS ONCE
Status: COMPLETED | OUTPATIENT
Start: 2018-03-09 | End: 2018-03-09

## 2018-03-09 RX ORDER — TAMSULOSIN HYDROCHLORIDE 0.4 MG/1
0.4 CAPSULE ORAL DAILY
COMMUNITY
End: 2018-03-28 | Stop reason: SDUPTHER

## 2018-03-09 RX ORDER — CARISOPRODOL 350 MG/1
350 TABLET ORAL EVERY 8 HOURS PRN
COMMUNITY

## 2018-03-09 RX ORDER — GABAPENTIN 300 MG/1
900 CAPSULE ORAL NIGHTLY
COMMUNITY
End: 2018-03-21 | Stop reason: ALTCHOICE

## 2018-03-09 RX ORDER — MELOXICAM 15 MG/1
15 TABLET ORAL DAILY
COMMUNITY
End: 2018-03-09 | Stop reason: ALTCHOICE

## 2018-03-09 RX ORDER — OXYCODONE HCL 20 MG/1
20 TABLET, FILM COATED, EXTENDED RELEASE ORAL EVERY 8 HOURS PRN
COMMUNITY

## 2018-03-09 RX ADMIN — SODIUM CHLORIDE: 9 INJECTION, SOLUTION INTRAVENOUS at 12:00

## 2018-03-09 ASSESSMENT — ENCOUNTER SYMPTOMS
BACK PAIN: 1
EYES NEGATIVE: 1
GASTROINTESTINAL NEGATIVE: 1
RESPIRATORY NEGATIVE: 1
ALLERGIC/IMMUNOLOGIC NEGATIVE: 1

## 2018-03-12 ENCOUNTER — TELEPHONE (OUTPATIENT)
Dept: NEUROSURGERY | Facility: CLINIC | Age: 68
End: 2018-03-12

## 2018-03-12 NOTE — TELEPHONE ENCOUNTER
Judy just letting us know that the patient has reached all his goals from OT standpoint so they are discharging him.    luis gifford CMA

## 2018-03-13 ENCOUNTER — TELEPHONE (OUTPATIENT)
Dept: NEUROSURGERY | Facility: CLINIC | Age: 68
End: 2018-03-13

## 2018-03-13 NOTE — TELEPHONE ENCOUNTER
Niki says they have re-admitted the patient to home care PT and will be seeing him 2-3 days/wk and will send orders here for signature.    luis gifford CMA

## 2018-03-15 ENCOUNTER — TELEPHONE (OUTPATIENT)
Dept: NEUROSURGERY | Facility: CLINIC | Age: 68
End: 2018-03-15

## 2018-03-15 NOTE — TELEPHONE ENCOUNTER
----- Message from Bandar Donis MD sent at 3/15/2018  3:06 PM CDT -----  Who ever normally manages his narcotics will have to address this

## 2018-03-15 NOTE — TELEPHONE ENCOUNTER
Jeanette saw the patient this morning and called the office & left me a voicemail.  She stated the patient was having break through pain - example: took his Roxicodone 20 mg at 700 am and when she saw him at 930 am his pain was still present & on a pain was of 10 it was an 8.  She is questioning whether or not the patient can have something more for the break through pain.    I will talk with Dr Donis this afternoon after clinic and let her know.      luis gifford CMA

## 2018-03-15 NOTE — TELEPHONE ENCOUNTER
"I tried calling Jeanette Rosado back to let her know the patient will have to contact his primary care or whoever managed his pain issues before but she didn't answer.  I left her a voicemail asking her to call me tomorrow to let me know she got my message.    I also called the patient to let him know this.  He wasn't happy and said \"thank you for calling\".    luis gifford CMA  "

## 2018-03-21 ENCOUNTER — OFFICE VISIT (OUTPATIENT)
Dept: FAMILY MEDICINE CLINIC | Age: 68
End: 2018-03-21
Payer: MEDICARE

## 2018-03-21 VITALS
TEMPERATURE: 97.5 F | OXYGEN SATURATION: 96 % | SYSTOLIC BLOOD PRESSURE: 86 MMHG | DIASTOLIC BLOOD PRESSURE: 54 MMHG | HEART RATE: 61 BPM | WEIGHT: 174 LBS | BODY MASS INDEX: 24.97 KG/M2

## 2018-03-21 DIAGNOSIS — J40 BRONCHITIS: Primary | ICD-10-CM

## 2018-03-21 DIAGNOSIS — R97.20 INCREASED PROSTATE SPECIFIC ANTIGEN (PSA) VELOCITY: ICD-10-CM

## 2018-03-21 DIAGNOSIS — I95.9 HYPOTENSION, UNSPECIFIED HYPOTENSION TYPE: ICD-10-CM

## 2018-03-21 DIAGNOSIS — G47.00 INSOMNIA, UNSPECIFIED TYPE: ICD-10-CM

## 2018-03-21 LAB
ALBUMIN SERPL-MCNC: 3.1 G/DL (ref 3.5–5.2)
ALP BLD-CCNC: 123 U/L (ref 40–130)
ALT SERPL-CCNC: 7 U/L (ref 5–41)
ANION GAP SERPL CALCULATED.3IONS-SCNC: 12 MMOL/L (ref 7–19)
AST SERPL-CCNC: 13 U/L (ref 5–40)
BILIRUB SERPL-MCNC: 0.4 MG/DL (ref 0.2–1.2)
BUN BLDV-MCNC: 15 MG/DL (ref 8–23)
CALCIUM SERPL-MCNC: 8.5 MG/DL (ref 8.8–10.2)
CHLORIDE BLD-SCNC: 97 MMOL/L (ref 98–111)
CO2: 29 MMOL/L (ref 22–29)
CREAT SERPL-MCNC: 0.9 MG/DL (ref 0.5–1.2)
GFR NON-AFRICAN AMERICAN: >60
GLUCOSE BLD-MCNC: 99 MG/DL (ref 74–109)
HCT VFR BLD CALC: 31.2 % (ref 42–52)
HEMOGLOBIN: 10 G/DL (ref 14–18)
MCH RBC QN AUTO: 30.7 PG (ref 27–31)
MCHC RBC AUTO-ENTMCNC: 32.1 G/DL (ref 33–37)
MCV RBC AUTO: 95.7 FL (ref 80–94)
PDW BLD-RTO: 14.5 % (ref 11.5–14.5)
PLATELET # BLD: 190 K/UL (ref 130–400)
PMV BLD AUTO: 9.3 FL (ref 9.4–12.4)
POTASSIUM SERPL-SCNC: 4.2 MMOL/L (ref 3.5–5)
RBC # BLD: 3.26 M/UL (ref 4.7–6.1)
SODIUM BLD-SCNC: 138 MMOL/L (ref 136–145)
TOTAL PROTEIN: 6.2 G/DL (ref 6.6–8.7)
WBC # BLD: 5.1 K/UL (ref 4.8–10.8)

## 2018-03-21 PROCEDURE — 99214 OFFICE O/P EST MOD 30 MIN: CPT | Performed by: FAMILY MEDICINE

## 2018-03-21 RX ORDER — ZOLPIDEM TARTRATE 10 MG/1
10 TABLET ORAL NIGHTLY PRN
Qty: 14 TABLET | Refills: 1 | Status: SHIPPED | OUTPATIENT
Start: 2018-03-21 | End: 2018-03-28 | Stop reason: ALTCHOICE

## 2018-03-21 RX ORDER — ALPRAZOLAM 1 MG/1
1 TABLET ORAL
COMMUNITY
End: 2018-03-21 | Stop reason: ALTCHOICE

## 2018-03-21 RX ORDER — CEFDINIR 300 MG/1
300 CAPSULE ORAL 2 TIMES DAILY
Qty: 20 CAPSULE | Refills: 0 | Status: SHIPPED | OUTPATIENT
Start: 2018-03-21 | End: 2018-03-28 | Stop reason: ALTCHOICE

## 2018-03-21 ASSESSMENT — ENCOUNTER SYMPTOMS
EYES NEGATIVE: 1
ALLERGIC/IMMUNOLOGIC NEGATIVE: 1
GASTROINTESTINAL NEGATIVE: 1
BACK PAIN: 1
RESPIRATORY NEGATIVE: 1

## 2018-03-21 NOTE — PROGRESS NOTES
 oxyCODONE (OXYCONTIN) 20 MG extended release tablet Take 20 mg by mouth every 8 hours as needed for Pain. No current facility-administered medications for this visit. Allergies   Allergen Reactions    Codeine     Declomycin [Demeclocycline]        Review of Systems   Constitutional: Positive for fatigue. HENT: Negative. Eyes: Negative. Respiratory: Negative. Cardiovascular: Negative. Gastrointestinal: Negative. Endocrine: Negative. Genitourinary: Negative. Musculoskeletal: Positive for arthralgias, back pain and neck pain. Right Shoulder pain   Skin: Negative. Allergic/Immunologic: Negative. Neurological: Negative. Hematological: Negative. Psychiatric/Behavioral: Positive for sleep disturbance. OBJECTIVE:    Physical Exam   Constitutional: He is oriented to person, place, and time. He appears well-developed and well-nourished. He has a sickly appearance. HENT:   Head: Normocephalic and atraumatic. Right Ear: External ear normal.   Left Ear: External ear normal.   Nose: Nose normal.   Mouth/Throat: Oropharynx is clear and moist.   Eyes: Conjunctivae and EOM are normal. Pupils are equal, round, and reactive to light. Neck: Normal range of motion. Neck supple. Cardiovascular: Normal rate, regular rhythm, S1 normal, S2 normal, normal heart sounds, intact distal pulses and normal pulses. Pulmonary/Chest: Effort normal and breath sounds normal. No apnea. Abdominal: Soft. Normal appearance. Musculoskeletal:        Right shoulder: He exhibits decreased range of motion. Cervical back: He exhibits decreased range of motion, pain and spasm. Neck collar in place   Neurological: He is alert and oriented to person, place, and time. He has normal strength and normal reflexes. Skin: Skin is warm, dry and intact. Psychiatric: He has a normal mood and affect.  His speech is normal and behavior is normal. Judgment and thought content normal.

## 2018-03-22 ENCOUNTER — HOSPITAL ENCOUNTER (OUTPATIENT)
Dept: GENERAL RADIOLOGY | Facility: HOSPITAL | Age: 68
Discharge: HOME | End: 2018-03-22
Admitting: NURSE PRACTITIONER

## 2018-03-22 ENCOUNTER — OFFICE VISIT (OUTPATIENT)
Dept: NEUROSURGERY | Facility: CLINIC | Age: 68
End: 2018-03-22

## 2018-03-22 VITALS
SYSTOLIC BLOOD PRESSURE: 100 MMHG | WEIGHT: 169 LBS | DIASTOLIC BLOOD PRESSURE: 64 MMHG | BODY MASS INDEX: 24.2 KG/M2 | HEIGHT: 70 IN

## 2018-03-22 DIAGNOSIS — S12.110D CLOSED ODONTOID FRACTURE WITH TYPE II MORPHOLOGY, ANTERIOR DISPLACEMENT, AND ROUTINE HEALING: Primary | ICD-10-CM

## 2018-03-22 DIAGNOSIS — IMO0001 NORMAL BODY MASS INDEX (BMI): ICD-10-CM

## 2018-03-22 DIAGNOSIS — Z78.9 NON-SMOKER: ICD-10-CM

## 2018-03-22 PROCEDURE — 72050 X-RAY EXAM NECK SPINE 4/5VWS: CPT

## 2018-03-22 PROCEDURE — 99024 POSTOP FOLLOW-UP VISIT: CPT | Performed by: NURSE PRACTITIONER

## 2018-03-22 RX ORDER — TAMSULOSIN HYDROCHLORIDE 0.4 MG/1
0.4 CAPSULE ORAL
COMMUNITY
End: 2018-04-05 | Stop reason: SDUPTHER

## 2018-03-22 RX ORDER — CARISOPRODOL 350 MG/1
350 TABLET ORAL AS NEEDED
COMMUNITY
End: 2019-03-08

## 2018-03-22 RX ORDER — CEFDINIR 300 MG/1
300 CAPSULE ORAL
COMMUNITY
Start: 2018-03-21 | End: 2018-03-31

## 2018-03-22 RX ORDER — POTASSIUM CHLORIDE 750 MG/1
TABLET, FILM COATED, EXTENDED RELEASE ORAL
COMMUNITY
Start: 2018-02-23 | End: 2018-06-19

## 2018-03-22 RX ORDER — MIRTAZAPINE 15 MG/1
TABLET, FILM COATED ORAL
COMMUNITY
Start: 2018-03-09 | End: 2018-06-19 | Stop reason: ALTCHOICE

## 2018-03-22 NOTE — PROGRESS NOTES
"    Chief complaint:   Chief Complaint   Patient presents with   • Post-op     Vikas returns today for his post op visit for his [facility] on 02/27/18.          Subjective     HPI: This is a 67-year-old male gentleman went to the operating room on 02/27/2018 for C1 to C4 posterior instrumented fusion for a C1 and C2 fracture.  He is here to be evaluated today.  He states overall he feels like is doing very well.  He says that his neck does still hurt but it is improving and is better compared to what it was prior to being discharged from the hospital.  He did get readmitted back early March for SVT and atrial flutter.  He does appear to be stable from this at this time.  He remains in the brace.  He has not been driving.  He denies any arm pain.  Denies any numbness and tingling in his arms.  Overall he does appear to be doing very well from the surgery.    Review of Systems   Musculoskeletal: Positive for back pain and neck pain.   Neurological: Positive for numbness.         Objective      Vital Signs  /64 (BP Location: Right arm, Patient Position: Sitting)   Ht 177.8 cm (70\")   Wt 76.7 kg (169 lb)   BMI 24.25 kg/m²     Physical Exam   Constitutional: He is oriented to person, place, and time. He appears well-developed and well-nourished.   HENT:   Head: Normocephalic.   Eyes: EOM are normal. Pupils are equal, round, and reactive to light.   Neck: Normal range of motion.   Pulmonary/Chest: Effort normal.   Musculoskeletal: Normal range of motion.        Cervical back: He exhibits pain.        Lumbar back: He exhibits pain.   Neurological: He is alert and oriented to person, place, and time. He has normal strength and normal reflexes. No cranial nerve deficit or sensory deficit. Gait normal. GCS eye subscore is 4. GCS verbal subscore is 5. GCS motor subscore is 6.   Skin: Skin is warm.   Psychiatric: He has a normal mood and affect. His speech is normal and behavior is normal. Thought content normal. "       Results Review: No new imaging          Assessment/Plan: At this point were going to follow-up again with the patient in 6 weeks and he will have an appointment with Dr. Donis.  We will go ahead and send him for set of x-rays of his cervical spine a day to see can come out of the brace.  We will follow-up with him once the x-rays are completed.  BMI shows that he is healthy weight.  He is a nonsmoker.         Vikas was seen today for post-op.    Diagnoses and all orders for this visit:    Closed odontoid fracture with type II morphology, anterior displacement, and routine healing    Non-smoker    Normal body mass index (BMI)    Other orders  -     XR Spine Cervical Complete 4 or 5 View        I discussed the patients findings and my recommendations with patient  Silverio Montanez, ROXIE  03/22/18  11:41 AM

## 2018-03-26 DIAGNOSIS — S12.110A ODONTOID FRACTURE WITH TYPE II MORPHOLOGY (HCC): ICD-10-CM

## 2018-03-26 RX ORDER — TAMSULOSIN HYDROCHLORIDE 0.4 MG/1
0.4 CAPSULE ORAL DAILY
Qty: 30 CAPSULE | OUTPATIENT
Start: 2018-03-26

## 2018-03-26 NOTE — TELEPHONE ENCOUNTER
Shouldn't this come from his primary care provider?  Let me know and I will take care of it.    luis gifford CMA

## 2018-03-28 ENCOUNTER — OFFICE VISIT (OUTPATIENT)
Dept: FAMILY MEDICINE CLINIC | Age: 68
End: 2018-03-28
Payer: MEDICARE

## 2018-03-28 VITALS
OXYGEN SATURATION: 97 % | DIASTOLIC BLOOD PRESSURE: 68 MMHG | TEMPERATURE: 97.9 F | SYSTOLIC BLOOD PRESSURE: 110 MMHG | HEART RATE: 82 BPM | BODY MASS INDEX: 23.68 KG/M2 | WEIGHT: 165 LBS

## 2018-03-28 DIAGNOSIS — G47.00 INSOMNIA, UNSPECIFIED TYPE: Primary | ICD-10-CM

## 2018-03-28 DIAGNOSIS — R97.20 INCREASED PROSTATE SPECIFIC ANTIGEN (PSA) VELOCITY: ICD-10-CM

## 2018-03-28 DIAGNOSIS — N40.0 BENIGN PROSTATIC HYPERPLASIA, UNSPECIFIED WHETHER LOWER URINARY TRACT SYMPTOMS PRESENT: ICD-10-CM

## 2018-03-28 PROCEDURE — 99214 OFFICE O/P EST MOD 30 MIN: CPT | Performed by: FAMILY MEDICINE

## 2018-03-28 RX ORDER — ALPRAZOLAM 1 MG/1
1 TABLET ORAL NIGHTLY PRN
Qty: 30 TABLET | Refills: 0 | Status: SHIPPED | OUTPATIENT
Start: 2018-03-28 | End: 2018-04-11 | Stop reason: SDUPTHER

## 2018-03-28 RX ORDER — TAMSULOSIN HYDROCHLORIDE 0.4 MG/1
0.4 CAPSULE ORAL DAILY
Qty: 30 CAPSULE | Refills: 5 | Status: SHIPPED | OUTPATIENT
Start: 2018-03-28 | End: 2018-09-21 | Stop reason: SDUPTHER

## 2018-03-28 ASSESSMENT — ENCOUNTER SYMPTOMS
ALLERGIC/IMMUNOLOGIC NEGATIVE: 1
GASTROINTESTINAL NEGATIVE: 1
BACK PAIN: 1
EYES NEGATIVE: 1
RESPIRATORY NEGATIVE: 1

## 2018-04-02 ENCOUNTER — TELEPHONE (OUTPATIENT)
Dept: NEUROSURGERY | Facility: CLINIC | Age: 68
End: 2018-04-02

## 2018-04-02 NOTE — TELEPHONE ENCOUNTER
ALEX FROM HOME [facility] CALLED AND STATED TO LEAVE A MESSAGE FOR DR. KEY THAT MR. NATARAJAN WAS COMPLETED WITH HOME [facility] AND HE IS DOING GOOD.      THANKS,  NIURKA

## 2018-04-05 ENCOUNTER — HOSPITAL ENCOUNTER (EMERGENCY)
Facility: HOSPITAL | Age: 68
Discharge: LEFT BEFORE DISCHARGE | End: 2018-04-05

## 2018-04-05 ENCOUNTER — OFFICE VISIT (OUTPATIENT)
Dept: CARDIOLOGY | Facility: CLINIC | Age: 68
End: 2018-04-05

## 2018-04-05 ENCOUNTER — TELEPHONE (OUTPATIENT)
Dept: CARDIOLOGY | Facility: CLINIC | Age: 68
End: 2018-04-05

## 2018-04-05 VITALS
HEIGHT: 70 IN | WEIGHT: 174 LBS | DIASTOLIC BLOOD PRESSURE: 82 MMHG | SYSTOLIC BLOOD PRESSURE: 149 MMHG | RESPIRATION RATE: 18 BRPM | HEART RATE: 74 BPM | BODY MASS INDEX: 24.91 KG/M2 | TEMPERATURE: 99 F | OXYGEN SATURATION: 100 %

## 2018-04-05 VITALS
OXYGEN SATURATION: 98 % | BODY MASS INDEX: 25.03 KG/M2 | HEART RATE: 69 BPM | DIASTOLIC BLOOD PRESSURE: 90 MMHG | SYSTOLIC BLOOD PRESSURE: 150 MMHG | WEIGHT: 174.8 LBS | HEIGHT: 70 IN

## 2018-04-05 DIAGNOSIS — D64.9 ANEMIA, UNSPECIFIED TYPE: ICD-10-CM

## 2018-04-05 DIAGNOSIS — I95.2 HYPOTENSION DUE TO DRUGS: ICD-10-CM

## 2018-04-05 DIAGNOSIS — E66.3 OVERWEIGHT (BMI 25.0-29.9): ICD-10-CM

## 2018-04-05 DIAGNOSIS — R55 SYNCOPE, UNSPECIFIED SYNCOPE TYPE: ICD-10-CM

## 2018-04-05 DIAGNOSIS — I48.0 PAF (PAROXYSMAL ATRIAL FIBRILLATION) (HCC): Primary | ICD-10-CM

## 2018-04-05 DIAGNOSIS — Z87.19 HISTORY OF GI BLEED: ICD-10-CM

## 2018-04-05 DIAGNOSIS — I47.1 PSVT (PAROXYSMAL SUPRAVENTRICULAR TACHYCARDIA) (HCC): ICD-10-CM

## 2018-04-05 DIAGNOSIS — N18.30 CHRONIC KIDNEY DISEASE, STAGE III (MODERATE) (HCC): ICD-10-CM

## 2018-04-05 PROCEDURE — 93005 ELECTROCARDIOGRAM TRACING: CPT

## 2018-04-05 PROCEDURE — 99211 OFF/OP EST MAY X REQ PHY/QHP: CPT

## 2018-04-05 PROCEDURE — 93000 ELECTROCARDIOGRAM COMPLETE: CPT | Performed by: NURSE PRACTITIONER

## 2018-04-05 PROCEDURE — 99214 OFFICE O/P EST MOD 30 MIN: CPT | Performed by: NURSE PRACTITIONER

## 2018-04-05 PROCEDURE — 93010 ELECTROCARDIOGRAM REPORT: CPT | Performed by: INTERNAL MEDICINE

## 2018-04-05 NOTE — PATIENT INSTRUCTIONS
BMI for Adults  Body mass index (BMI) is a number that is calculated from a person's weight and height. In most adults, the number is used to find how much of an adult's weight is made up of fat. BMI is not as accurate as a direct measure of body fat.  How is BMI calculated?  BMI is calculated by dividing weight in kilograms by height in meters squared. It can also be calculated by dividing weight in pounds by height in inches squared, then multiplying the resulting number by 703. Charts are available to help you find your BMI quickly and easily without doing this calculation.  How is BMI interpreted?  Health care professionals use BMI charts to identify whether an adult is underweight, at a normal weight, or overweight based on the following guidelines:  · Underweight: BMI less than 18.5.  · Normal weight: BMI between 18.5 and 24.9.  · Overweight: BMI between 25 and 29.9.  · Obese: BMI of 30 and above.  BMI is usually interpreted the same for males and females.  Weight includes both fat and muscle, so someone with a muscular build, such as an athlete, may have a BMI that is higher than 24.9. In cases like these, BMI may not accurately depict body fat. To determine if excess body fat is the cause of a BMI of 25 or higher, further assessments may need to be done by a health care provider.  Why is BMI a useful tool?  BMI is used to identify a possible weight problem that may be related to a medical problem or may increase the risk for medical problems. BMI can also be used to promote changes to reach a healthy weight.  This information is not intended to replace advice given to you by your health care provider. Make sure you discuss any questions you have with your health care provider.  Document Released: 08/29/2005 Document Revised: 04/27/2017 Document Reviewed: 05/15/2015  [employer] Interactive Patient Education © 2017 [employer]    Heart-Healthy Eating Plan  Heart-healthy meal planning includes:  · Limiting  "unhealthy fats.  · Increasing healthy fats.  · Making other small dietary changes.  You may need to talk with your doctor or a diet specialist (dietitian) to create an eating plan that is right for you.  What types of fat should I choose?  · Choose healthy fats. These include olive oil and canola oil, flaxseeds, walnuts, almonds, and seeds.  · Eat more omega-3 fats. These include salmon, mackerel, sardines, tuna, flaxseed oil, and ground flaxseeds. Try to eat fish at least twice each week.  · Limit saturated fats.  ¨ Saturated fats are often found in animal products, such as meats, butter, and cream.  ¨ Plant sources of saturated fats include palm oil, palm kernel oil, and coconut oil.  · Avoid foods with partially hydrogenated oils in them. These include stick margarine, some tub margarines, cookies, crackers, and other baked goods. These contain trans fats.  What general guidelines do I need to follow?  · Check food labels carefully. Identify foods with trans fats or high amounts of saturated fat.  · Fill one half of your plate with vegetables and green salads. Eat 4-5 servings of vegetables per day. A serving of vegetables is:  ¨ 1 cup of raw leafy vegetables.  ¨ ½ cup of raw or cooked cut-up vegetables.  ¨ ½ cup of vegetable juice.  · Fill one fourth of your plate with whole grains. Look for the word \"whole\" as the first word in the ingredient list.  · Fill one fourth of your plate with lean protein foods.  · Eat 4-5 servings of fruit per day. A serving of fruit is:  ¨ One medium whole fruit.  ¨ ¼ cup of dried fruit.  ¨ ½ cup of fresh, frozen, or canned fruit.  ¨ ½ cup of 100% fruit juice.  · Eat more foods that contain soluble fiber. These include apples, broccoli, carrots, beans, peas, and barley. Try to get 20-30 g of fiber per day.  · Eat more home-cooked food. Eat less restaurant, buffet, and fast food.  · Limit or avoid alcohol.  · Limit foods high in starch and sugar.  · Avoid fried foods.  · Avoid frying " your food. Try baking, boiling, grilling, or broiling it instead. You can also reduce fat by:  ¨ Removing the skin from poultry.  ¨ Removing all visible fats from meats.  ¨ Skimming the fat off of stews, soups, and gravies before serving them.  ¨ Steaming vegetables in water or broth.  · Lose weight if you are overweight.  · Eat 4-5 servings of nuts, legumes, and seeds per week:  ¨ One serving of dried beans or legumes equals ½ cup after being cooked.  ¨ One serving of nuts equals 1½ ounces.  ¨ One serving of seeds equals ½ ounce or one tablespoon.  · You may need to keep track of how much salt or sodium you eat. This is especially true if you have high blood pressure. Talk with your doctor or dietitian to get more information.  What foods can I eat?  Grains   Breads, including [place], white, haily, wheat, raisin, rye, oatmeal, and Italian. Tortillas that are neither fried nor made with lard or trans fat. Low-fat rolls, including hotdog and hamburger buns and English muffins. Biscuits. Muffins. Waffles. Pancakes. Light popcorn. Whole-grain cereals. Flatbread. [place] toast. Pretzels. Breadsticks. Rusks. Low-fat snacks. Low-fat crackers, including oyster, saltine, matzo, dmitry, animal, and rye. Rice and pasta, including brown rice and pastas that are made with whole wheat.  Vegetables   All vegetables.  Fruits   All fruits, but limit coconut.  Meats and Other Protein Sources   Lean, well-trimmed beef, veal, pork, and lamb. Chicken and turkey without skin. All fish and shellfish. Wild duck, rabbit, pheasant, and venison. Egg whites or low-cholesterol egg substitutes. Dried beans, peas, lentils, and tofu. Seeds and most nuts.  Dairy   Low-fat or nonfat cheeses, including ricotta, string, and mozzarella. Skim or 1% milk that is liquid, powdered, or evaporated. Buttermilk that is made with low-fat milk. Nonfat or low-fat yogurt.  Beverages   Mineral water. Diet carbonated beverages.  Sweets and Desserts   Sherbets and  fruit ices. Honey, jam, marmalade, jelly, and syrups. Meringues and gelatins. Pure sugar candy, such as hard candy, jelly beans, gumdrops, mints, marshmallows, and small amounts of dark chocolate. Mariusz food cake.  Eat all sweets and desserts in moderation.  Fats and Oils   Nonhydrogenated (trans-free) margarines. Vegetable oils, including soybean, sesame, sunflower, olive, peanut, safflower, corn, canola, and cottonseed. Salad dressings or mayonnaise made with a vegetable oil. Limit added fats and oils that you use for cooking, baking, salads, and as spreads.  Other   Cocoa powder. Coffee and tea. All seasonings and condiments.  The items listed above may not be a complete list of recommended foods or beverages. Contact your dietitian for more options.   What foods are not recommended?  Grains   Breads that are made with saturated or trans fats, oils, or whole milk. Croissants. Butter rolls. Cheese breads. Sweet rolls. Donuts. Buttered popcorn. Chow mein noodles. High-fat crackers, such as cheese or butter crackers.  Meats and Other Protein Sources   Fatty meats, such as hotdogs, short ribs, sausage, spareribs, jimenez, rib eye roast or steak, and mutton. High-fat deli meats, such as salami and bologna. Caviar. Domestic duck and goose. Organ meats, such as kidney, liver, sweetbreads, and heart.  Dairy   Cream, sour cream, cream cheese, and creamed cottage cheese. Whole-milk cheeses, including blue (mady), [place] Neftali, Brie, Gabriel, American, Havarti, Swiss, cheddar, Camembert, and [place]. Whole or 2% milk that is liquid, evaporated, or condensed. Whole buttermilk. Cream sauce or high-fat cheese sauce. Yogurt that is made from whole milk.  Beverages   Regular sodas and juice drinks with added sugar.  Sweets and Desserts   Frosting. Pudding. Cookies. Cakes other than mariusz food cake. Candy that has milk chocolate or white chocolate, hydrogenated fat, butter, coconut, or unknown ingredients. Buttered syrups. Full-fat  ice cream or ice cream drinks.  Fats and Oils   Gravy that has suet, meat fat, or shortening. Cocoa butter, hydrogenated oils, palm oil, coconut oil, palm kernel oil. These can often be found in baked products, candy, fried foods, nondairy creamers, and whipped toppings. Solid fats and shortenings, including jimenez fat, salt pork, lard, and butter. Nondairy cream substitutes, such as coffee creamers and sour cream substitutes. Salad dressings that are made of unknown oils, cheese, or sour cream.  The items listed above may not be a complete list of foods and beverages to avoid. Contact your dietitian for more information.   This information is not intended to replace advice given to you by your health care provider. Make sure you discuss any questions you have with your health care provider.  Document Released: 06/18/2013 Document Revised: 05/25/2017 Document Reviewed: 06/11/2015  [employer] Interactive Patient Education © 2017 [employer]    Exercising to Lose Weight  Exercising can help you to lose weight. In order to lose weight through exercise, you need to do vigorous-intensity exercise. You can tell that you are exercising with vigorous intensity if you are breathing very hard and fast and cannot hold a conversation while exercising.  Moderate-intensity exercise helps to maintain your current weight. You can tell that you are exercising at a moderate level if you have a higher heart rate and faster breathing, but you are still able to hold a conversation.  How often should I exercise?  Choose an activity that you enjoy and set realistic goals. Your health care provider can help you to make an activity plan that works for you. Exercise regularly as directed by your health care provider. This may include:  · Doing resistance training twice each week, such as:  ¨ Push-ups.  ¨ Sit-ups.  ¨ Lifting weights.  ¨ Using resistance bands.  · Doing a given intensity of exercise for a given amount of time. Choose from these  options:  ¨ 150 minutes of moderate-intensity exercise every week.  ¨ 75 minutes of vigorous-intensity exercise every week.  ¨ A mix of moderate-intensity and vigorous-intensity exercise every week.  Children, pregnant women, people who are out of shape, people who are overweight, and older adults may need to consult a health care provider for individual recommendations. If you have any sort of medical condition, be sure to consult your health care provider before starting a new exercise program.  What are some activities that can help me to lose weight?  · Walking at a rate of at least 4.5 miles an hour.  · Jogging or running at a rate of 5 miles per hour.  · Biking at a rate of at least 10 miles per hour.  · Lap swimming.  · Roller-skating or in-line skating.  · Cross-country skiing.  · Vigorous competitive sports, such as football, basketball, and soccer.  · Jumping rope.  · Aerobic dancing.  How can I be more active in my day-to-day activities?  · Use the stairs instead of the elevator.  · Take a walk during your lunch break.  · If you drive, park your car farther away from work or school.  · If you take public transportation, get off one stop early and walk the rest of the way.  · Make all of your phone calls while standing up and walking around.  · Get up, stretch, and walk around every 30 minutes throughout the day.  What guidelines should I follow while exercising?  · Do not exercise so much that you hurt yourself, feel dizzy, or get very short of breath.  · Consult your health care provider prior to starting a new exercise program.  · Wear comfortable clothes and shoes with good support.  · Drink plenty of water while you exercise to prevent dehydration or heat stroke. Body water is lost during exercise and must be replaced.  · Work out until you breathe faster and your heart beats faster.  This information is not intended to replace advice given to you by your health care provider. Make sure you discuss  any questions you have with your health care provider.  Document Released: 01/20/2012 Document Revised: 05/25/2017 Document Reviewed: 05/21/2015  [employer] Interactive Patient Education © 2017 [employer]

## 2018-04-05 NOTE — PROGRESS NOTES
"    Subjective:     Encounter Date:04/05/2018      Patient ID: Vikas Cruz is a 67 y.o. male. He presents today for one month follow up of hospital discharge for syncopal episode. He was noted to be having paroxysms of atrial fibrillation. He was not started on anticoagulation at that time due to anemia with recent cervical fusion. He has a history of paroxysmal atrial fibrillation, paroxysmal supraventricular tachycardia, cervical fusion, anemia and gastrointestinal bleed. He denies any additional syncopal episodes since discharge. He denies chest pain, shortness of breath, palpitations, dizziness, orthopnea, PND, swelling or decreased stamina. He reports being taken off of \"all blood pressure medicines\" (including betablocker) recently by PCP due to hypotension. Blood pressure elevated today, however pt reports readings have been in the 130s-140s/70s at home. He reports history of GI bleed when on Eliquis.     Chief Complaint:  Atrial Fibrillation   Presents for follow-up visit. Symptoms are negative for an AICD problem, bradycardia, chest pain, dizziness, hemodynamic instability, hypertension, hypotension, pacemaker problem, palpitations, shortness of breath, syncope, tachycardia and weakness. The symptoms have been stable. Past medical history includes atrial fibrillation.   Anemia   Presents for follow-up visit. There has been no abdominal pain, anorexia, bruising/bleeding easily, confusion, fever, leg swelling, light-headedness, malaise/fatigue, pallor, palpitations, paresthesias, pica or weight loss. Signs of blood loss that are not present include hematemesis, hematochezia and melena.       The following portions of the patient's history were reviewed and updated as appropriate: allergies, current medications, past family history, past medical history, past social history, past surgical history and problem list.     Allergies   Allergen Reactions   • Codeine Itching   • Declomycin [Demeclocycline] Rash   • " Tylenol [Acetaminophen] Itching     Patient tolerated Percocet inpatient and reports tolerating Percocet prior to admission.     Current outpatient and discharge medications have been reconciled for the patient.  ROXIE Mccurdy    Current Outpatient Prescriptions:   •  ALPRAZolam (XANAX) 1 MG tablet, Take 1 mg by mouth 3 (Three) Times a Day As Needed for Anxiety., Disp: , Rfl:   •  oxyCODONE (ROXICODONE) 20 MG tablet, Take 1 tablet by mouth Every 8 (Eight) Hours As Needed for Moderate Pain ., Disp: 90 tablet, Rfl: 0  •  tamsulosin (FLOMAX) 0.4 MG capsule 24 hr capsule, Take 1 capsule by mouth Daily., Disp: 30 capsule, Rfl: 0  •  zolpidem (AMBIEN) 10 MG tablet, Take 10 mg by mouth At Night As Needed for Sleep., Disp: , Rfl:   •  carisoprodol (SOMA) 350 MG tablet, Take 350 mg by mouth., Disp: , Rfl:   •  gabapentin (NEURONTIN) 300 MG capsule, Take 900 mg by mouth Every Night., Disp: , Rfl:   •  meloxicam (MOBIC) 15 MG tablet, Take 15 mg by mouth Daily., Disp: , Rfl:   •  mirtazapine (REMERON) 15 MG tablet, , Disp: , Rfl:   •  potassium chloride (K-DUR) 10 MEQ CR tablet, , Disp: , Rfl:   Past Medical History:   Diagnosis Date   • Anxiety    • Chronic kidney disease    • Chronic pain syndrome    • GI bleeding    • History of transfusion    • Hyperlipidemia    • Hypertension    • Injury of back    • Insomnia    • Neck injury    • PAF (paroxysmal atrial fibrillation)    • Therapeutic opioid induced constipation      Past Surgical History:   Procedure Laterality Date   • BACK SURGERY     • CERVICAL LAMINECTOMY DECOMPRESSION POSTERIOR N/A 2/27/2018    Procedure: CERVICAL  POSTERIOR INSTRUMENTED FUSION C1-4;  Surgeon: Bandar Donis MD;  Location: [facility] OR;  Service:    • ODONTOID FRACTURE SURGERY       Family History   Problem Relation Age of Onset   • Cancer Mother    • No Known Problems Father      Social History     Social History   • Marital status:      Spouse name: N/A   • Number of children: N/A   •  "Years of education: N/A     Occupational History   • Not on file.     Social History Main Topics   • Smoking status: Never Smoker   • Smokeless tobacco: Never Used   • Alcohol use No      Comment: occassionally   • Drug use: No   • Sexual activity: Defer     Other Topics Concern   • Not on file     Social History Narrative   • No narrative on file         Review of Systems   Constitution: Negative for chills, decreased appetite, fever, weakness, malaise/fatigue, night sweats, weight gain and weight loss.   HENT: Negative for nosebleeds.    Eyes: Negative for visual disturbance.   Cardiovascular: Negative for chest pain, dyspnea on exertion, leg swelling, near-syncope, orthopnea, palpitations, paroxysmal nocturnal dyspnea and syncope.   Respiratory: Negative for cough, hemoptysis, shortness of breath, snoring and wheezing.    Endocrine: Negative for cold intolerance and heat intolerance.   Hematologic/Lymphatic: Does not bruise/bleed easily.   Skin: Negative for pallor and rash.   Musculoskeletal: Negative for back pain and falls.   Gastrointestinal: Negative for abdominal pain, anorexia, change in bowel habit, constipation, diarrhea, dysphagia, heartburn, hematemesis, hematochezia, melena, nausea and vomiting.   Genitourinary: Negative for hematuria.   Neurological: Negative for dizziness, headaches, light-headedness and paresthesias.   Psychiatric/Behavioral: Negative for altered mental status and confusion.   Allergic/Immunologic: Negative for persistent infections.         ECG 12 Lead  Date/Time: 4/5/2018 10:45 AM  Performed by: EDMOND SOSA  Authorized by: EDMOND SOSA   Comparison: compared with previous ECG from 3/6/2018  Similar to previous ECG  Rhythm: sinus rhythm  Rate: normal  BPM: 69  Other findings: LVH  Clinical impression: abnormal ECG          /90 (BP Location: Right arm, Patient Position: Sitting, Cuff Size: Adult)   Pulse 69   Ht 177.8 cm (70\")   Wt 79.3 kg (174 lb 12.8 oz)   SpO2 " 98%   BMI 25.08 kg/m²        Objective:     Physical Exam   Constitutional: He is oriented to person, place, and time. Vital signs are normal. He appears well-developed and well-nourished. No distress.   HENT:   Head: Normocephalic and atraumatic.   Right Ear: External ear normal.   Left Ear: External ear normal.   Eyes: Conjunctivae are normal. Pupils are equal, round, and reactive to light. Right eye exhibits no discharge. Left eye exhibits no discharge.   Neck: Normal range of motion. Neck supple. No JVD present. Carotid bruit is not present. No thyromegaly present.   Cardiovascular: Normal rate, regular rhythm, normal heart sounds and intact distal pulses.  PMI is not displaced.  Exam reveals no gallop, no friction rub and no decreased pulses.    No murmur heard.  Pulses:       Radial pulses are 2+ on the right side, and 2+ on the left side.        Dorsalis pedis pulses are 2+ on the right side, and 2+ on the left side.        Posterior tibial pulses are 2+ on the right side, and 2+ on the left side.   Pulmonary/Chest: Effort normal and breath sounds normal. No respiratory distress. He has no decreased breath sounds. He has no wheezes. He has no rhonchi. He has no rales. He exhibits no tenderness.   Abdominal: Soft. Bowel sounds are normal. He exhibits no distension. There is no tenderness.   Musculoskeletal: Normal range of motion. He exhibits no edema.   Neurological: He is alert and oriented to person, place, and time.   Skin: Skin is warm and dry. No rash noted. He is not diaphoretic. No erythema. No pallor.   Psychiatric: He has a normal mood and affect. His behavior is normal. Judgment and thought content normal.   Vitals reviewed.      Lab Review:   Lab Results   Component Value Date    WBC 4.65 (L) 03/07/2018    HGB 9.5 (L) 03/07/2018    HCT 27.0 (L) 03/07/2018    MCV 88.8 03/07/2018     03/07/2018     Lab Results   Component Value Date    GLUCOSE 97 03/07/2018    BUN 31 (H) 03/07/2018     CREATININE 1.56 (H) 03/07/2018    EGFRIFNONA 45 (L) 03/07/2018    BCR 19.9 03/07/2018    K 3.7 03/07/2018    CO2 28.0 03/07/2018    CALCIUM 8.0 (L) 03/07/2018    ALBUMIN 2.70 (L) 03/06/2018    LABIL2 1.0 (L) 03/06/2018    AST 29 03/06/2018    ALT 28 03/06/2018         Assessment:          Diagnosis Plan   1. PAF (paroxysmal atrial fibrillation)  Maintaining sinus rhythm. No anticoagulation due to history of GI bleed on Eliquis. Betablocker discontinued by PCP due to hypotension.    2. PSVT (paroxysmal supraventricular tachycardia)     3. Chronic kidney disease, stage III (moderate)     4. Anemia, unspecified type     5. History of GI bleed     6. Hypotension due to drugs  Elevated today. Continue to monitor.    7. Overweight (BMI 25.0-29.9)  Discussed the patient's BMI with him. BMI is above normal parameters. Follow-up plan includes:  educational material.     8. Syncope, unspecified syncope type  No additional episodes since hospital discharge.           Plan:       1. Continue medications as previously prescribed.  2.  Monitor and record daily blood pressure. Report readings consistently higher than 140/90 or consistently lower than 100/60.   3. Report any worsening symptoms.   4. Follow up with PCP for blood pressure management and routine lab work.  5. Continue heart healthy diet and regular exercise as tolerated.   6. Follow up with Dr. Dixon in six months, or sooner if needed.

## 2018-04-06 ENCOUNTER — NURSE ONLY (OUTPATIENT)
Dept: FAMILY MEDICINE CLINIC | Age: 68
End: 2018-04-06

## 2018-04-06 VITALS — SYSTOLIC BLOOD PRESSURE: 136 MMHG | DIASTOLIC BLOOD PRESSURE: 76 MMHG

## 2018-04-11 ENCOUNTER — OFFICE VISIT (OUTPATIENT)
Dept: FAMILY MEDICINE CLINIC | Age: 68
End: 2018-04-11
Payer: MEDICARE

## 2018-04-11 VITALS
SYSTOLIC BLOOD PRESSURE: 144 MMHG | HEIGHT: 70 IN | OXYGEN SATURATION: 97 % | RESPIRATION RATE: 20 BRPM | TEMPERATURE: 98.4 F | BODY MASS INDEX: 24.34 KG/M2 | DIASTOLIC BLOOD PRESSURE: 90 MMHG | HEART RATE: 74 BPM | WEIGHT: 170 LBS

## 2018-04-11 DIAGNOSIS — I10 ESSENTIAL HYPERTENSION: Primary | ICD-10-CM

## 2018-04-11 DIAGNOSIS — G47.00 INSOMNIA, UNSPECIFIED TYPE: ICD-10-CM

## 2018-04-11 PROCEDURE — 99214 OFFICE O/P EST MOD 30 MIN: CPT | Performed by: FAMILY MEDICINE

## 2018-04-11 RX ORDER — ALPRAZOLAM 1 MG/1
1 TABLET ORAL 2 TIMES DAILY
Qty: 30 TABLET | Refills: 2 | Status: SHIPPED | OUTPATIENT
Start: 2018-04-11 | End: 2018-08-03 | Stop reason: SDUPTHER

## 2018-04-11 RX ORDER — AMLODIPINE BESYLATE AND BENAZEPRIL HYDROCHLORIDE 2.5; 1 MG/1; MG/1
1 CAPSULE ORAL DAILY
Qty: 30 CAPSULE | Refills: 5 | Status: SHIPPED | OUTPATIENT
Start: 2018-04-11 | End: 2018-10-25 | Stop reason: SDUPTHER

## 2018-04-11 RX ORDER — AMLODIPINE BESYLATE 2.5 MG/1
2.5 TABLET ORAL DAILY
COMMUNITY
End: 2018-04-11 | Stop reason: ALTCHOICE

## 2018-04-11 ASSESSMENT — PATIENT HEALTH QUESTIONNAIRE - PHQ9
2. FEELING DOWN, DEPRESSED OR HOPELESS: 0
SUM OF ALL RESPONSES TO PHQ QUESTIONS 1-9: 0
SUM OF ALL RESPONSES TO PHQ9 QUESTIONS 1 & 2: 0
1. LITTLE INTEREST OR PLEASURE IN DOING THINGS: 0

## 2018-04-11 ASSESSMENT — ENCOUNTER SYMPTOMS
RESPIRATORY NEGATIVE: 1
EYES NEGATIVE: 1
ALLERGIC/IMMUNOLOGIC NEGATIVE: 1
GASTROINTESTINAL NEGATIVE: 1

## 2018-04-12 RX ORDER — HYDROMORPHONE HCL IN 0.9% NACL 0.5 MG/ML
SYRINGE (ML) INTRAVENOUS
Status: DISPENSED
Start: 2018-04-12 | End: 2018-04-13

## 2018-05-10 ENCOUNTER — OFFICE VISIT (OUTPATIENT)
Dept: NEUROSURGERY | Facility: CLINIC | Age: 68
End: 2018-05-10

## 2018-05-10 VITALS
SYSTOLIC BLOOD PRESSURE: 100 MMHG | DIASTOLIC BLOOD PRESSURE: 62 MMHG | WEIGHT: 176 LBS | BODY MASS INDEX: 25.2 KG/M2 | HEIGHT: 70 IN

## 2018-05-10 DIAGNOSIS — Z78.9 NON-SMOKER: ICD-10-CM

## 2018-05-10 DIAGNOSIS — S12.110D CLOSED ODONTOID FRACTURE WITH TYPE II MORPHOLOGY, ANTERIOR DISPLACEMENT, AND ROUTINE HEALING: Primary | ICD-10-CM

## 2018-05-10 LAB — PSA SERPL-MCNC: 4.99 NG/ML (ref 0–4)

## 2018-05-10 PROCEDURE — 99024 POSTOP FOLLOW-UP VISIT: CPT | Performed by: NEUROLOGICAL SURGERY

## 2018-05-10 RX ORDER — LOSARTAN POTASSIUM 100 MG/1
TABLET ORAL
COMMUNITY
Start: 2018-03-24 | End: 2018-06-19

## 2018-05-10 RX ORDER — ZOLPIDEM TARTRATE 5 MG/1
TABLET ORAL NIGHTLY PRN
COMMUNITY
Start: 2018-04-23 | End: 2019-03-08

## 2018-05-10 RX ORDER — AMLODIPINE BESYLATE AND BENAZEPRIL HYDROCHLORIDE 2.5; 1 MG/1; MG/1
CAPSULE ORAL
COMMUNITY
Start: 2018-04-11 | End: 2018-06-19

## 2018-05-10 RX ORDER — FUROSEMIDE 40 MG/1
TABLET ORAL
COMMUNITY
Start: 2018-03-24 | End: 2018-06-19 | Stop reason: ALTCHOICE

## 2018-05-10 RX ORDER — TERAZOSIN 1 MG/1
CAPSULE ORAL
COMMUNITY
Start: 2018-03-24 | End: 2018-06-19 | Stop reason: ALTCHOICE

## 2018-05-10 NOTE — PATIENT INSTRUCTIONS
PATIENT TO CONTINUE HIS YEARLY EXAMS WITH HIS PRIMARY CARE PROVIDER TO ENSURE COMPLETE HEALTH MAINTENANCE.    BMI for Adults  Body mass index (BMI) is a number that is calculated from a person's weight and height. In most adults, the number is used to find how much of an adult's weight is made up of fat. BMI is not as accurate as a direct measure of body fat.  How is BMI calculated?  BMI is calculated by dividing weight in kilograms by height in meters squared. It can also be calculated by dividing weight in pounds by height in inches squared, then multiplying the resulting number by 703. Charts are available to help you find your BMI quickly and easily without doing this calculation.  How is BMI interpreted?  Health care professionals use BMI charts to identify whether an adult is underweight, at a normal weight, or overweight based on the following guidelines:  · Underweight: BMI less than 18.5.  · Normal weight: BMI between 18.5 and 24.9.  · Overweight: BMI between 25 and 29.9.  · Obese: BMI of 30 and above.  BMI is usually interpreted the same for males and females.  Weight includes both fat and muscle, so someone with a muscular build, such as an athlete, may have a BMI that is higher than 24.9. In cases like these, BMI may not accurately depict body fat. To determine if excess body fat is the cause of a BMI of 25 or higher, further assessments may need to be done by a health care provider.  Why is BMI a useful tool?  BMI is used to identify a possible weight problem that may be related to a medical problem or may increase the risk for medical problems. BMI can also be used to promote changes to reach a healthy weight.  This information is not intended to replace advice given to you by your health care provider. Make sure you discuss any questions you have with your health care provider.  Document Released: 08/29/2005 Document Revised: 04/27/2017 Document Reviewed: 05/15/2015  Elsevier Interactive Patient  Education © 2017 [employer]

## 2018-05-10 NOTE — PROGRESS NOTES
"SUBJECTIVE:  Patient ID: Vikas Cruz is a 67 y.o. male is here today for follow-up.    Chief Complaint: Odontoid fracture  Chief Complaint   Patient presents with   • Odontoid fracture     patient had C2 laminectomy, C1-4 PIF on 2/27/18; today the patient states he is doing fair but is concerned about a \"popping\" in his neck.       HPI  67-year-old gentleman went to the operating room in February after an odontoid fracture above a previous extensive cervical fusion.  He had a C1 to C4 stabilization and decompression.  He is here to discuss his follow-up.  He is doing pretty well he has no neck pain no upper extremity pain numbness or tingling.  He has chronic low back pain which he sees pain management .  He admits to noticing decreased range of motion of his neck but he is back to driving and riding his motorcycle without any difficulty.    The following portions of the patient's history were reviewed and updated as appropriate: allergies, current medications, past family history, past medical history, past social history, past surgical history and problem list.    OBJECTIVE:    Review of Systems   Musculoskeletal: Positive for neck pain.   All other systems reviewed and are negative.         Physical Exam   Constitutional: He is oriented to person, place, and time. He appears well-developed and well-nourished.   HENT:   Head: Normocephalic and atraumatic.   Right Ear: Hearing normal.   Left Ear: Hearing normal.   Eyes: EOM are normal. Pupils are equal, round, and reactive to light.   Neck: Normal range of motion.   Neurological: He is alert and oriented to person, place, and time. He has normal strength and normal reflexes. No cranial nerve deficit or sensory deficit. He displays a negative Romberg sign. GCS eye subscore is 4. GCS verbal subscore is 5. GCS motor subscore is 6. He displays no Babinski's sign on the right side. He displays no Babinski's sign on the left side.   Psychiatric: His speech is normal. " Judgment normal. Cognition and memory are normal.       Neurologic Exam     Mental Status   Oriented to person, place, and time.   Speech: speech is normal     Cranial Nerves     CN III, IV, VI   Pupils are equal, round, and reactive to light.  Extraocular motions are normal.     Motor Exam     Strength   Strength 5/5 throughout.    significant decrease in his range of motion in flexion extension and rotation    Independent Review of Radiographic Studies:   Plain x-ray show good positioning of the hardware    ASSESSMENT/PLAN:  Mr. Cruz is doing very well after his odontoid fracture unfortunately he required an extensive stabilization given his prior history of neck issues.  He is functioning well without any neck pain.  We will see him in follow-up in 3 months      1. Closed odontoid fracture with type II morphology, anterior displacement, and routine healing    2. Non-smoker    3. BMI 25.0-25.9,adult            Return in about 3 months (around 8/10/2018) for follow up w/[facility].      Bandar Donis MD

## 2018-05-16 ENCOUNTER — OFFICE VISIT (OUTPATIENT)
Dept: UROLOGY | Facility: CLINIC | Age: 68
End: 2018-05-16

## 2018-05-16 VITALS
BODY MASS INDEX: 25.05 KG/M2 | SYSTOLIC BLOOD PRESSURE: 118 MMHG | TEMPERATURE: 98.9 F | HEIGHT: 70 IN | DIASTOLIC BLOOD PRESSURE: 70 MMHG | WEIGHT: 175 LBS

## 2018-05-16 DIAGNOSIS — N40.1 BENIGN PROSTATIC HYPERPLASIA WITH URINARY RETENTION: ICD-10-CM

## 2018-05-16 DIAGNOSIS — R33.8 BENIGN PROSTATIC HYPERPLASIA WITH URINARY RETENTION: ICD-10-CM

## 2018-05-16 DIAGNOSIS — R35.1 NOCTURIA: ICD-10-CM

## 2018-05-16 DIAGNOSIS — R97.20 ELEVATED PROSTATE SPECIFIC ANTIGEN (PSA): Primary | ICD-10-CM

## 2018-05-16 PROBLEM — Z79.2 PREVENTIVE ANTIBIOTIC: Status: ACTIVE | Noted: 2018-05-16

## 2018-05-16 PROCEDURE — 51798 US URINE CAPACITY MEASURE: CPT | Performed by: UROLOGY

## 2018-05-16 PROCEDURE — 99214 OFFICE O/P EST MOD 30 MIN: CPT | Performed by: UROLOGY

## 2018-05-16 RX ORDER — CIPROFLOXACIN 500 MG/1
500 TABLET, FILM COATED ORAL 2 TIMES DAILY
Qty: 6 TABLET | Refills: 3 | Status: SHIPPED | OUTPATIENT
Start: 2018-05-16 | End: 2018-06-19

## 2018-05-16 NOTE — PROGRESS NOTES
Mr. Cruz is 67 y.o. male    Chief Complaint   Patient presents with   • Elevated PSA   • Benign Prostatic Hypertrophy       Benign Prostatic Hypertrophy   This is a chronic problem. The current episode started more than 1 year ago. The problem is unchanged. Irritative symptoms include frequency and nocturia. Irritative symptoms do not include urgency. Obstructive symptoms include a weak stream. Pertinent negatives include no chills, dysuria, hematuria or vomiting. Exacerbated by: anesthetic. Past treatments include tamsulosin. The treatment provided significant relief. He has been using treatment for 1 to 4 weeks.       The following portions of the patient's history were reviewed and updated as appropriate: allergies, current medications, past family history, past medical history, past social history, past surgical history and problem list.    Review of Systems   Constitutional: Negative for chills and fever.   Gastrointestinal: Negative for abdominal pain, anal bleeding, blood in stool and vomiting.   Genitourinary: Positive for frequency and nocturia. Negative for difficulty urinating, dysuria, flank pain, hematuria and urgency.         Current Outpatient Prescriptions:   •  ALPRAZolam (XANAX) 1 MG tablet, Take 1 mg by mouth 3 (Three) Times a Day As Needed for Anxiety., Disp: , Rfl:   •  amLODIPine-benazepril (LOTREL 2.5-10) 2.5-10 MG per capsule, Take  by mouth., Disp: , Rfl:   •  carisoprodol (SOMA) 350 MG tablet, Take 350 mg by mouth., Disp: , Rfl:   •  ciprofloxacin (CIPRO) 500 MG tablet, Take 1 tablet by mouth 2 (Two) Times a Day. Start the day prior to biopsy, Disp: 6 tablet, Rfl: 3  •  furosemide (LASIX) 40 MG tablet, , Disp: , Rfl:   •  gabapentin (NEURONTIN) 300 MG capsule, Take 900 mg by mouth Every Night., Disp: , Rfl:   •  losartan (COZAAR) 100 MG tablet, , Disp: , Rfl:   •  meloxicam (MOBIC) 15 MG tablet, Take 15 mg by mouth Daily., Disp: , Rfl:   •  mirtazapine (REMERON) 15 MG tablet, , Disp: ,  "Rfl:   •  oxyCODONE (ROXICODONE) 20 MG tablet, Take 1 tablet by mouth Every 8 (Eight) Hours As Needed for Moderate Pain ., Disp: 90 tablet, Rfl: 0  •  potassium chloride (K-DUR) 10 MEQ CR tablet, , Disp: , Rfl:   •  tamsulosin (FLOMAX) 0.4 MG capsule 24 hr capsule, Take 1 capsule by mouth Daily., Disp: 30 capsule, Rfl: 0  •  terazosin (HYTRIN) 1 MG capsule, , Disp: , Rfl:   •  zolpidem (AMBIEN) 5 MG tablet, , Disp: , Rfl:     Past Medical History:   Diagnosis Date   • Anxiety    • Chronic kidney disease    • Chronic pain syndrome    • GI bleeding    • History of transfusion    • Hyperlipidemia    • Hypertension    • Injury of back    • Insomnia    • Neck injury    • PAF (paroxysmal atrial fibrillation)    • Therapeutic opioid induced constipation        Past Surgical History:   Procedure Laterality Date   • BACK SURGERY     • CERVICAL LAMINECTOMY DECOMPRESSION POSTERIOR N/A 2/27/2018    Procedure: CERVICAL  POSTERIOR INSTRUMENTED FUSION C1-4;  Surgeon: Bandar Donis MD;  Location: [facility];  Service:    • ODONTOID FRACTURE SURGERY         Social History     Social History   • Marital status:      Social History Main Topics   • Smoking status: Never Smoker   • Smokeless tobacco: Never Used   • Alcohol use No      Comment: occassionally   • Drug use: No   • Sexual activity: Defer     Other Topics Concern   • Not on file       Family History   Problem Relation Age of Onset   • Cancer Mother    • No Known Problems Father        Objective    /70   Temp 98.9 °F (37.2 °C)   Ht 177.8 cm (70\")   Wt 79.4 kg (175 lb)   BMI 25.11 kg/m²     Physical Exam    Office Visit on 03/08/2018   Component Date Value Ref Range Status   • PSA 05/10/2018 4.990* 0.000 - 4.000 ng/mL Final       Results for orders placed or performed in visit on 03/08/18   PSA DIAGNOSTIC   Result Value Ref Range    PSA 4.990 (H) 0.000 - 4.000 ng/mL     Assessment and Plan    Vikas was seen today for elevated psa and benign prostatic " hypertrophy.    Diagnoses and all orders for this visit:    Elevated prostate specific antigen (PSA)  -     Biopsy Prostate  -     ciprofloxacin (CIPRO) 500 MG tablet; Take 1 tablet by mouth 2 (Two) Times a Day. Start the day prior to biopsy    Benign prostatic hyperplasia with urinary retention    Nocturia       His PSA is 4.99.  I discussed with him that this is concerning elevation in combination with his abnormal rectal exam.  We discussed options for this, and he is chosen to undergo biopsy.  He will be high risk given his multiple hospitalizations side given him oral antibiotics as they usually do, but I will also schedule him just receive IV Rocephin on the day of the procedure.  He is urinating well, and his postvoid residual scan today shows a residual of 42 mL. he does have a significant complaint of nocturia and at once his results of his biopsy are back, may benefit from addition of further medications.    I discussed elevated PSA with him today. We discussed that PSA is a protein measured in the bloodstream that comes exclusively from the prostate gland I mentioned to him that all men with a prostate gland will have a certain PSA level. We discussed that this number can be compared to all men and age-specific PSA as well as PSA velocity. We discussed that Prostate Cancer is a possible cause of PSA elevaton, but benign etiologies such as infection, enlargement, aging, and inflammation should also be considered. We discussed that some patients with a normal PSA may also have prostate cancer. The necessity of digital rectal examination is also discussed. The role of free to total PSA Ratio is explained. The risks (infection, bleeding, pain, retention, sepsis) and possible benefits of transrectal ultrasound with biopsy of the prostate gland is also discussed. It is explained that some patients require a repeat biopsy based on diagnosis of prostate intraepithelial neoplasia or even a continued rising PSA  after an initial biopsy. He voiced no additional questions.           Estimation of residual urine via abdominal ultrasound  Residual Urine: 42 ml  Indication: nocturia  Position: Supine  Examination: Incremental scanning of the suprapubic area using 3 MHz transducer using copious amounts of acoustic gel.   Findings: An anechoic area was demonstrated which represented the bladder, with measurement of residual urine as noted. I inspected this myself. In that the residual urine was stable or insignificant, no treatment will be necessary at this time.

## 2018-06-05 ENCOUNTER — INFUSION (OUTPATIENT)
Dept: ONCOLOGY | Facility: HOSPITAL | Age: 68
End: 2018-06-05

## 2018-06-05 ENCOUNTER — PROCEDURE VISIT (OUTPATIENT)
Dept: UROLOGY | Facility: CLINIC | Age: 68
End: 2018-06-05

## 2018-06-05 VITALS
HEIGHT: 70 IN | WEIGHT: 173.6 LBS | SYSTOLIC BLOOD PRESSURE: 131 MMHG | TEMPERATURE: 97.7 F | OXYGEN SATURATION: 100 % | DIASTOLIC BLOOD PRESSURE: 66 MMHG | BODY MASS INDEX: 24.85 KG/M2 | RESPIRATION RATE: 18 BRPM | HEART RATE: 65 BPM

## 2018-06-05 DIAGNOSIS — N40.1 BENIGN PROSTATIC HYPERPLASIA WITH URINARY RETENTION: Primary | ICD-10-CM

## 2018-06-05 DIAGNOSIS — R97.20 ELEVATED PROSTATE SPECIFIC ANTIGEN (PSA): Primary | ICD-10-CM

## 2018-06-05 DIAGNOSIS — Z79.2 PREVENTIVE ANTIBIOTIC: ICD-10-CM

## 2018-06-05 DIAGNOSIS — R33.8 BENIGN PROSTATIC HYPERPLASIA WITH URINARY RETENTION: Primary | ICD-10-CM

## 2018-06-05 DIAGNOSIS — R97.20 ELEVATED PROSTATE SPECIFIC ANTIGEN (PSA): ICD-10-CM

## 2018-06-05 PROCEDURE — 76872 US TRANSRECTAL: CPT | Performed by: UROLOGY

## 2018-06-05 PROCEDURE — 25010000002 CEFTRIAXONE PER 250 MG: Performed by: UROLOGY

## 2018-06-05 PROCEDURE — 55700 PR PROSTATE NEEDLE BIOPSY ANY APPROACH: CPT | Performed by: UROLOGY

## 2018-06-05 PROCEDURE — G0416 PROSTATE BIOPSY, ANY MTHD: HCPCS | Performed by: UROLOGY

## 2018-06-05 PROCEDURE — 96374 THER/PROPH/DIAG INJ IV PUSH: CPT

## 2018-06-05 PROCEDURE — 88342 IMHCHEM/IMCYTCHM 1ST ANTB: CPT | Performed by: UROLOGY

## 2018-06-05 PROCEDURE — 76942 ECHO GUIDE FOR BIOPSY: CPT | Performed by: UROLOGY

## 2018-06-05 RX ORDER — CEFTRIAXONE 1 G/1
1 INJECTION, POWDER, FOR SOLUTION INTRAMUSCULAR; INTRAVENOUS ONCE
Status: DISCONTINUED | OUTPATIENT
Start: 2018-06-05 | End: 2018-06-05 | Stop reason: SDUPTHER

## 2018-06-05 RX ORDER — CEFTRIAXONE 1 G/1
1 INJECTION, POWDER, FOR SOLUTION INTRAMUSCULAR; INTRAVENOUS ONCE
Status: CANCELLED
Start: 2018-06-05 | End: 2018-06-05

## 2018-06-05 RX ADMIN — CEFTRIAXONE SODIUM 1 G: 1 INJECTION, POWDER, FOR SOLUTION INTRAMUSCULAR; INTRAVENOUS at 10:30

## 2018-06-05 NOTE — PROGRESS NOTES
Indications:  Elevated PSA    Pre-operative prep:  fleets enema, oral antibiotic, stopped aspirin, coumadin, and other anticoagulants and IV rocephin    Last PSA:  Lab Results   Component Value Date    PSA 4.990 (H) 05/10/2018       Procedure:    After proper identification of patient and procedure, patient was placed in the left later decubitus position.  2% lidocaine jelly was instilled per rectum  for topical anesthesia.  The ultrasound probe was gently inserted per rectum. Prostate was scanned from the base of the bladder to the apex.  20 cc of 1% lidocaine plain was then used to perform a prostate nerve block injecting the junction of the seminal vesicle and bladder laterally.      Prostate length: 3.8 cm    Prostate width: 4.1 cm    Prostate height: 2.3 cm    Prostate volume: 18.5 cc    Abnormal findings:  Hyperechoic lesions    Median lobe:  no    A total of 12 biopsies were taken from the base, apex, and mid portion of the gland on both the right and the left sides.     Patient tolerated the procedure well    Complications: none    Estimated blood loss: minimal    Mr. Cruz was given instructions for follow up.  He will notify the office if he has excessive hematuria, hematochezia, fevers, perineal, or abdominal pain.    Follow up:   He will follow up in 2 weeks for pathology results.

## 2018-06-11 LAB
CYTO UR: NORMAL
LAB AP CASE REPORT: NORMAL
LAB AP SYNOPTIC CHECKLIST: NORMAL
Lab: NORMAL
PATH REPORT.FINAL DX SPEC: NORMAL
PATH REPORT.GROSS SPEC: NORMAL

## 2018-06-19 ENCOUNTER — OFFICE VISIT (OUTPATIENT)
Dept: UROLOGY | Facility: CLINIC | Age: 68
End: 2018-06-19

## 2018-06-19 VITALS — WEIGHT: 171 LBS | BODY MASS INDEX: 27.48 KG/M2 | TEMPERATURE: 98.7 F | HEIGHT: 66 IN

## 2018-06-19 DIAGNOSIS — C61 PROSTATE CANCER (HCC): Primary | ICD-10-CM

## 2018-06-19 PROCEDURE — 99214 OFFICE O/P EST MOD 30 MIN: CPT | Performed by: UROLOGY

## 2018-06-19 NOTE — PROGRESS NOTES
Mr. Cruz is 67 y.o. male    Chief Complaint   Patient presents with   • Prostate Cancer       History of Present Illness   He was initially diagnosed with prostate cancer about  2 week(s) ago. This was identified in the context of elevated psa and prostate nodule.  Severity of the disease is best described as probable organ confined disease. Previous or current management includes no decision at this time. Associated symptoms include no evidence of irritative or obstructive voiding symptoms, gross hematuria, lower extremity swelling or weight loss. . Currently his PSA is  4.99.     The following portions of the patient's history were reviewed and updated as appropriate: allergies, current medications, past family history, past medical history, past social history, past surgical history and problem list.    Review of Systems   Constitutional: Negative for chills and fever.   Gastrointestinal: Negative for abdominal pain, anal bleeding and blood in stool.   Genitourinary: Positive for frequency (During the Night). Negative for difficulty urinating, flank pain, hematuria and urgency.         Current Outpatient Prescriptions:   •  ALPRAZolam (XANAX) 1 MG tablet, Take 1 mg by mouth 3 (Three) Times a Day As Needed for Anxiety., Disp: , Rfl:   •  carisoprodol (SOMA) 350 MG tablet, Take 350 mg by mouth., Disp: , Rfl:   •  oxyCODONE (ROXICODONE) 20 MG tablet, Take 1 tablet by mouth Every 8 (Eight) Hours As Needed for Moderate Pain ., Disp: 90 tablet, Rfl: 0  •  tamsulosin (FLOMAX) 0.4 MG capsule 24 hr capsule, Take 1 capsule by mouth Daily., Disp: 30 capsule, Rfl: 0  •  zolpidem (AMBIEN) 5 MG tablet, At Night As Needed., Disp: , Rfl:     Past Medical History:   Diagnosis Date   • Anxiety    • Chronic kidney disease    • Chronic pain syndrome    • GI bleeding    • History of transfusion    • Hyperlipidemia    • Hypertension    • Injury of back    • Insomnia    • Neck injury    • PAF (paroxysmal atrial fibrillation)    •  "Therapeutic opioid induced constipation        Past Surgical History:   Procedure Laterality Date   • BACK SURGERY     • CERVICAL LAMINECTOMY DECOMPRESSION POSTERIOR N/A 2/27/2018    Procedure: CERVICAL  POSTERIOR INSTRUMENTED FUSION C1-4;  Surgeon: Bandar Donis MD;  Location: [facility] OR;  Service:    • ODONTOID FRACTURE SURGERY         Social History     Social History   • Marital status:      Social History Main Topics   • Smoking status: Never Smoker   • Smokeless tobacco: Never Used   • Alcohol use No      Comment: occassionally   • Drug use: No   • Sexual activity: Defer     Other Topics Concern   • Not on file       Family History   Problem Relation Age of Onset   • Cancer Mother    • No Known Problems Father        Objective    Temp 98.7 °F (37.1 °C)   Ht 167.6 cm (66\")   Wt 77.6 kg (171 lb)   BMI 27.60 kg/m²     Physical Exam    Procedure visit on 06/05/2018   Component Date Value Ref Range Status   • Case Report 06/05/2018    Final                    Value:Surgical Pathology Report                         Case: [id number]                                  Authorizing Provider:  Sharad Rivera MD     Collected:           06/05/2018 12:15 PM          Ordering Location:     [facility]     Received:            06/06/2018 07:11 AM                                 GROUP UROLOGY                                                                Pathologist:           Zaynab Sotelo MD                                                         Specimens:   1) - Prostate, Prostate needle biopsy, left lateral base                                            2) - Prostate, Prostate, Needle Biopsy, left lateral mid                                            3) - Prostate, Prostate, Needle Biopsy, left lateral apex                                           4) - Prostate, Prostate, Needle Biopsy, left base                                                   5) - Prostate, Prostate, Needle Biopsy, " left mid                                                                              6) - Prostate, Prostate, Needle Biopsy, left apex                                                   7) - Prostate, Prostate, Needle Biopsy, right base                                                  8) - Prostate, Prostate, Needle Biopsy, right mid                                                   9) - Prostate, Prostate, Needle Biopsy, right apex                                                  10) - Prostate, Prostate, Needle Biopsy, right lateral base                                         11) - Prostate, Prostate, Needle Biopsy, right lateral mid                                          12) - Prostate, Prostate, Needle Biopsy, right lateral apex                               • Final Diagnosis 06/05/2018    Final                    Value:This result contains rich text formatting which cannot be displayed here.   • Synoptic Checklist 06/05/2018    Final                    Value:PROSTATE GLAND: Needle Biopsy  (Prostate Bx - All Specimens)                                                                                       Procedure:    Needle Biopsy                                                        TUMOR                               Histologic Type:    Adenocarcinoma (acinar, not otherwise specified)                               Histologic Grade:                                     [place] Pattern:    [place] pattern (specifiy)                                   Primary (Predominant) Pattern:    Grade 3                                   Secondary (Worst Remaining) Pattern:    Grade 3                                   Total [place] Score:    6                                                        EXTENT                               Tumor Quantitation:                                     Number of Cores Positive:    3                                 Total Number of Cores:    12                                 Tumor  Quantitation:    Proportion (%) of prostatic tissue involved by tumor: 2                                 Tumor Quantitation:    Length of prostatic tissue involved by tumor                                   Total Linear Millimeters of Carcinoma (mm):    1.8 mm                                   Total Linear Millimeters of Needle Core Tissue (mm):    124 mm                                 Proportion (%) of Prostatic Tissue Involved by Tumor for Core with the Greatest Amount of Tumor:    5     • Gross Description 06/05/2018    Final                    Value:This result contains rich text formatting which cannot be displayed here.   • Microscopic Description 06/05/2018    Final                    Value:This result contains rich text formatting which cannot be displayed here.       Results for orders placed or performed in visit on 06/05/18   Tissue Pathology Exam   Result Value Ref Range    Case Report       Surgical Pathology Report                         Case: [id number]                                  Authorizing Provider:  Sharad Rivera MD     Collected:           06/05/2018 12:15 PM          Ordering Location:     [facility]     Received:            06/06/2018 07:11 AM                                 GROUP UROLOGY                                                                Pathologist:           Zaynab Sotelo MD                                                         Specimens:   1) - Prostate, Prostate needle biopsy, left lateral base                                            2) - Prostate, Prostate, Needle Biopsy, left lateral mid                                            3) - Prostate, Prostate, Needle Biopsy, left lateral apex                                           4) - Prostate, Prostate, Needle Biopsy, left base                                                   5) - Prostate, Prostate, Needle Biopsy, left mid                                                     6) - Prostate,  Prostate, Needle Biopsy, left apex                                                   7) - Prostate, Prostate, Needle Biopsy, right base                                                  8) - Prostate, Prostate, Needle Biopsy, right mid                                                   9) - Prostate, Prostate, Needle Biopsy, right apex                                                  10) - Prostate, Prostate, Needle Biopsy, right lateral base                                         11) - Prostate, Prostate, Needle Biopsy, right lateral mid                                          12) - Prostate, Prostate, Needle Biopsy, right lateral apex                                Final Diagnosis       1.  Prostate, left lateral base, needle biopsy: Benign prostate tissue.    2.  Prostate, left lateral mid, needle biopsy: Adenocarcinoma of the prostate, [place] grade 3+3 = 6, involving approximately 5% of the total volume of the biopsy (Grade Group 1).    3.  Prostate, left lateral apex, needle biopsy:  A.  Benign prostate tissue.  B.  Minute fragments of benign anal squamous mucosa.    4.  Prostate, left base, needle biopsy: Benign prostate tissue.    5.  Prostate, left mid, needle biopsy: Adenocarcinoma of the prostate, [place] grade 3+3 = 6, involving approximately 1% of the total volume of the biopsy (Grade Group 1).    6.  Prostate, left apex, needle biopsy: Adenocarcinoma of the prostate, [place] grade 3+3 = 6, involving approximately 10% of the total volume of the biopsy (Grade Group 1).    7.  Prostate, right base, needle biopsy: Benign prostate tissue.    8.  Prostate, right mid, needle biopsy: Benign prostate tissue.    9.  Prostate, right apex, needle biopsy: Benign prostate tissue.    10.  Prostate, right lateral base, needle biopsy: Benign prostate tissue.    11.  Prostate, right lateral mid, needle biopsy: Benign prostate tissue.    12.  Prostate, right lateral apex, needle biopsy: Benign prostate tissue  demonstrating glandular atrophy.    AJCC stage: T1c pNX      Synoptic Checklist       PROSTATE GLAND: Needle Biopsy  (Prostate Bx - All Specimens)            Procedure:    Needle Biopsy      TUMOR      Histologic Type:    Adenocarcinoma (acinar, not otherwise specified)      Histologic Grade:            [place] Pattern:    [place] pattern (specifiy)          Primary (Predominant) Pattern:    Grade 3          Secondary (Worst Remaining) Pattern:    Grade 3          Total [place] Score:    6      EXTENT      Tumor Quantitation:            Number of Cores Positive:    3        Total Number of Cores:    12        Tumor Quantitation:    Proportion (%) of prostatic tissue involved by tumor: 2        Tumor Quantitation:    Length of prostatic tissue involved by tumor          Total Linear Millimeters of Carcinoma (mm):    1.8 mm          Total Linear Millimeters of Needle Core Tissue (mm):    124 mm        Proportion (%) of Prostatic Tissue Involved by Tumor for Core with the Greatest Amount of Tumor:    5      Gross Description          Specimen # 1 is received in formalin labeled with the patient's name, date of birth, and “left lateral base prostate needle biopsy”.  The specimen consists of a telfa pad with one pink/gray soft tissue core biopsy measuring 1.7 cm in length by less than 0.1 cm in diameter.  The specimen is inked with hematoxylin and totally submitted in (block 1A).    Specimen # 2 is received in formalin labeled with the patient's name, date of birth, and “left lateral mid prostate needle biopsy”.  The specimen consists of a telfa pad with one pink/gray soft tissue core biopsy measuring 0.9 cm in length by less than 0.1 cm in diameter.  The specimen is inked with hematoxylin and totally submitted in (block 2A).    Specimen # 3 is received in formalin labeled with the patient's name, date of birth, and “left lateral apex prostate needle biopsy”.  The specimen consists of a telfa pad with one pink/gray soft  tissue core biopsy measuring 1.1 cm in length by less than 0.1 cm in diameter.  The specimen is inked with hematoxylin and totally submitted in (block 3A).    Specimen # 4 is received in formalin labeled with the patient's name, date of birth, and “left base prostate needle biopsy”.  The specimen consists of a telfa pad with one pink/gray soft tissue core biopsy measuring 1.2 cm in length by less than 0.1 cm in diameter.  The specimen is inked with hematoxylin and totally submitted in (block 4A).    Specimen # 5 is received in formalin labeled with the patient's name, date of birth, and “left mid prostate needle biopsy”.  The specimen consists of a telfa pad with one pink/gray soft tissue core biopsy measuring 1.6 cm in length by less than 0.1 cm in diameter.  The specimen is inked with hematoxylin and totally submitted in (block 5A).    Specimen # 6 is received in formalin labeled with the patient's name, date of birth, and “left apex prostate needle biopsy”.  The specimen consists of a telfa pad with one pink/gray soft tissue core biopsy measuring 1.3 cm in length by less than 0.1 cm in diameter.  The specimen is inked with hematoxylin and totally submitted in (block 6A).    Specimen # 7 is received in formalin labeled with the patient's name, date of birth, and “right base prostate needle biopsy”.  The specimen consists of a telfa pad with one pink/gray soft tissue core biopsy measuring 1.1 cm in length by less than 0.1 cm in diameter.  The specimen is inked with hematoxylin and totally submitted in (block 7A).    Specimen # 8 is received in formalin labeled with the patient's name, date of birth, and “right mid prostate needle biopsy”.  The specimen consists of a telfa pad with one pink/gray soft tissue core biopsy measuring 0.6 cm in length by less than 0.1 cm in diameter.  The specimen is inked with hematoxylin and totally submitted in (block 8A).    Specimen # 9 is received in formalin labeled with the  patient's name, date of birth, and “right apex prostate needle biopsy”.  The specimen consists of a telfa pad with one pink/gray soft tissue core biopsy measuring 0.6 cm in length by less than 0.1 cm in diameter.  The specimen is inked with hematoxylin and totally submitted in (block 9A).    Specimen # 10 is received in formalin labeled with the patient's name, date of birth, and “right lateral base prostate needle biopsy”.  The specimen consists of a telfa pad with one pink/gray soft tissue core biopsy measuring 0.9 cm in length by less than 0.1 cm in diameter.  The specimen is inked with hematoxylin and totally submitted in (block 10A).    Specimen # 11 is received in formalin labeled with the patient's name, date of birth, and “right lateral mid prostate needle biopsy”.  The specimen consists of a telfa pad with one pink/gray soft tissue core biopsy measuring 0.7 cm in length by less than 0.1 cm in diameter.  The specimen is inked with hematoxylin and totally submitted in (block 11A).    Specimen # 12 is received in formalin labeled with the patient's name, date of birth, and “right lateral apex prostate needle biopsy”.  The specimen consists of a telfa pad with 2 pink/gray soft tissue core biopsies measuring 0.2 cm and 0.5 cm in length by less than 0.1 cm in diameter.  The specimen is inked with hematoxylin and totally submitted in (block 12A).                                         Microscopic Description       1.  Step sections of the left lateral base needle biopsy reveal benign prostate tissue.    2.  Step sections of the left lateral mid needle biopsy reveal a focus of adenocarcinoma of the prostate, [place] grade 3+3 = 6.  The tumor has a linear length of 0.4 mm and involves approximately 5% of the total volume of the biopsy.    3.  Step sections of the left lateral apex needle biopsy reveal benign prostate tissue.  Minute fragments of benign anal squamous mucosa are present.    4.  Step sections of the  left base needle biopsy reveal benign prostate tissue.    5.  Step sections of the left mid-needle biopsy reveal adenocarcinoma of the prostate, [place] grade 3+3 = 6.  The tumor has a linear length of 0.2 mm and involves approximately 1% of the total volume of the biopsy.  To confirm the absence of a basal cell lining, immunohistochemical stain for  was performed.  This stain confirms the lack of a basal cell lining in the area of interest.  An appropriate working positive control was used.    6.  Step sections of the left apex needle biopsy reveal adenocarcinoma of the prostate, [place] grade 3+3 = 6.  The tumor has a linear length of 1.2 mm and involves approximately 10% of the total volume of the biopsy.  Absence of a basal cell lining in the area of interest is confirmed utilizing immunohistochemical stain for .  An appropriate working positive control was used.    7.  Step sections of the right base needle biopsy reveal benign prostate tissue.    8.  Step sections of the right mid prostate needle biopsy reveal benign prostate tissue.    9.  Step sections of the right apex needle biopsy reveal benign prostate tissue.    10.  Step sections of the right lateral base needle biopsy reveal benign prostate tissue.    11.  Step sections of the right lateral mid needle biopsy reveal benign prostate tissue.    12.  Step sections of the right lateral apex needle biopsy reveal benign prostate tissue demonstrating glandular atrophy.      Embedded Images       Assessment and Plan    Vikas was seen today for prostate cancer.    Diagnoses and all orders for this visit:    Prostate cancer  -     PSA DIAGNOSTIC; Future    I reviewed his pathology.  He does have [place] 3+3 prostate cancer in 3 of 12 cores, all 10% or less.    The patient and I had a lengthy discussion regarding prostate cancer. We discussed how prostate cancer is an adenocarcinoma and that his is the most common type. The natural course for most  "cases of adenocarcinoma of the prostate gland is explained. We considered the severity of the prostate cancer based upon the volume of positive cores and the [place] score which is explained to the patient. The patient's age and other demographics are considered. Further need for evaluation based upon rectal examination, warner grade and PSA is reviewed with the patient.. The remainder of the time spent today was regarding current management strategies for the patient. Watchful waiting is discussed as close follow up by monitoring symptoms, PSA, and MESHA to assess progression of prostate cancer. I explained that this is a reasonable option for patients whose life expectancy is less than ten years with low grade disease, which is reasonable since prostate cancer is generally slow to progress.  The role of hormonal therapy including the types available (GnRH agonists,Androgen Receptor blockers,and Orchiectomies) and its use as adjuvant, neoadjuvant, primary, and metastatic management are discussed. It was explained that this should not be considered curative therapy. Radiation Therapy for prostate cancer in  its most traditional forms (XRT & Prostate Interstitial Seed Implantation) is discussed including the length of time required to treat cancer in addition to the adverse effects of radiation on normal tissue is considered. Surgical Removal of the prostate, including laparoscopic, robotic, perineal approach, and the more traditional radical retropubic prostatectomy, was also discussed. I explained briefly the role of cryotherapy and chemotherapy  to the patient but described these therapies to be \"still under study\".  The patient is provided a copy of the [employer]'s information booklet on Prostate Cancer for home study and review. I answered all of the questions to a level that he expresses that he feels informed of his options, in addition to the benefits, risks, and alternatives for definitive " management of prostate cancer.    After this significant discussion, patient has chosen to proceed with active surveillance which I think is appropriate.  We discussed the typical active surveillance protocol and I will see him back in 6 months with a repeat PSA.  He understands the risks of active surveillance or progression of disease with inability to cure in the future, repeated biopsies leading to bleeding and infection, and the possible need for definitive future treatment.    25 minutes were spent with patient face-to-face with greater than 50% spent counseling.

## 2018-06-24 ENCOUNTER — RESULTS ENCOUNTER (OUTPATIENT)
Dept: UROLOGY | Facility: CLINIC | Age: 68
End: 2018-06-24

## 2018-06-24 DIAGNOSIS — C61 PROSTATE CANCER (HCC): ICD-10-CM

## 2018-08-03 ENCOUNTER — HOSPITAL ENCOUNTER (OUTPATIENT)
Dept: GENERAL RADIOLOGY | Age: 68
Discharge: HOME | End: 2018-08-03
Payer: MEDICARE

## 2018-08-03 ENCOUNTER — OFFICE VISIT (OUTPATIENT)
Dept: FAMILY MEDICINE CLINIC | Age: 68
End: 2018-08-03
Payer: MEDICARE

## 2018-08-03 VITALS
SYSTOLIC BLOOD PRESSURE: 136 MMHG | HEIGHT: 70 IN | RESPIRATION RATE: 18 BRPM | HEART RATE: 74 BPM | OXYGEN SATURATION: 98 % | DIASTOLIC BLOOD PRESSURE: 78 MMHG | BODY MASS INDEX: 24.2 KG/M2 | TEMPERATURE: 98.7 F | WEIGHT: 169 LBS

## 2018-08-03 DIAGNOSIS — R10.84 GENERALIZED ABDOMINAL PAIN: ICD-10-CM

## 2018-08-03 DIAGNOSIS — R11.0 NAUSEA: ICD-10-CM

## 2018-08-03 DIAGNOSIS — G47.00 INSOMNIA, UNSPECIFIED TYPE: ICD-10-CM

## 2018-08-03 DIAGNOSIS — K59.00 CONSTIPATION, UNSPECIFIED CONSTIPATION TYPE: ICD-10-CM

## 2018-08-03 DIAGNOSIS — R10.84 GENERALIZED ABDOMINAL PAIN: Primary | ICD-10-CM

## 2018-08-03 DIAGNOSIS — C61 PROSTATE CA (HCC): ICD-10-CM

## 2018-08-03 LAB
ALBUMIN SERPL-MCNC: 3.8 G/DL (ref 3.5–5.2)
ALP BLD-CCNC: 116 U/L (ref 40–130)
ALT SERPL-CCNC: 6 U/L (ref 5–41)
ANION GAP SERPL CALCULATED.3IONS-SCNC: 14 MMOL/L (ref 7–19)
AST SERPL-CCNC: 16 U/L (ref 5–40)
BILIRUB SERPL-MCNC: 0.6 MG/DL (ref 0.2–1.2)
BUN BLDV-MCNC: 17 MG/DL (ref 8–23)
CALCIUM SERPL-MCNC: 9 MG/DL (ref 8.8–10.2)
CHLORIDE BLD-SCNC: 97 MMOL/L (ref 98–111)
CO2: 26 MMOL/L (ref 22–29)
CREAT SERPL-MCNC: 1 MG/DL (ref 0.5–1.2)
GFR NON-AFRICAN AMERICAN: >60
GLUCOSE BLD-MCNC: 101 MG/DL (ref 74–109)
HCT VFR BLD CALC: 38.8 % (ref 42–52)
HEMOGLOBIN: 12.8 G/DL (ref 14–18)
LIPASE: 87 U/L (ref 13–60)
MCH RBC QN AUTO: 30.7 PG (ref 27–31)
MCHC RBC AUTO-ENTMCNC: 33 G/DL (ref 33–37)
MCV RBC AUTO: 93 FL (ref 80–94)
PDW BLD-RTO: 14.2 % (ref 11.5–14.5)
PLATELET # BLD: 150 K/UL (ref 130–400)
PMV BLD AUTO: 9.5 FL (ref 9.4–12.4)
POTASSIUM SERPL-SCNC: 4.1 MMOL/L (ref 3.5–5)
RBC # BLD: 4.17 M/UL (ref 4.7–6.1)
SODIUM BLD-SCNC: 137 MMOL/L (ref 136–145)
TOTAL PROTEIN: 7.1 G/DL (ref 6.6–8.7)
WBC # BLD: 4.5 K/UL (ref 4.8–10.8)

## 2018-08-03 PROCEDURE — 76705 ECHO EXAM OF ABDOMEN: CPT

## 2018-08-03 PROCEDURE — 74022 RADEX COMPL AQT ABD SERIES: CPT

## 2018-08-03 PROCEDURE — 99214 OFFICE O/P EST MOD 30 MIN: CPT | Performed by: FAMILY MEDICINE

## 2018-08-03 RX ORDER — ONDANSETRON 4 MG/1
4 TABLET, ORALLY DISINTEGRATING ORAL EVERY 8 HOURS PRN
Qty: 10 TABLET | Refills: 1 | Status: SHIPPED | OUTPATIENT
Start: 2018-08-03

## 2018-08-03 RX ORDER — ALPRAZOLAM 1 MG/1
1 TABLET ORAL 2 TIMES DAILY
Qty: 30 TABLET | Refills: 2 | Status: SHIPPED | OUTPATIENT
Start: 2018-08-03 | End: 2018-09-02

## 2018-08-03 ASSESSMENT — ENCOUNTER SYMPTOMS
ABDOMINAL PAIN: 1
ALLERGIC/IMMUNOLOGIC NEGATIVE: 1
CONSTIPATION: 1
EYES NEGATIVE: 1
RESPIRATORY NEGATIVE: 1

## 2018-08-11 LAB
CANNABINOID GC/MS: 170 NG/ML
OPIATES, HYDROCODONE: <2 NG/ML
OPIATES, HYDROMORPHONE: <2 NG/ML
OPIATES, OXYCODONE: 46 NG/ML
OPIATES, OXYMORPHONE: <2 NG/ML
OPIATES, SER/ PLASMA CODEINE: <2 NG/ML
OPIATES, SER/PLAS: <2 NG/ML
OPITATES, MORPHINE: <2 NG/ML

## 2018-08-13 ENCOUNTER — OFFICE VISIT (OUTPATIENT)
Dept: NEUROSURGERY | Facility: CLINIC | Age: 68
End: 2018-08-13

## 2018-08-13 VITALS
HEIGHT: 70 IN | BODY MASS INDEX: 25.17 KG/M2 | DIASTOLIC BLOOD PRESSURE: 85 MMHG | WEIGHT: 175.8 LBS | SYSTOLIC BLOOD PRESSURE: 142 MMHG

## 2018-08-13 DIAGNOSIS — S12.110D CLOSED ODONTOID FRACTURE WITH TYPE II MORPHOLOGY, ANTERIOR DISPLACEMENT, AND ROUTINE HEALING, SUBSEQUENT ENCOUNTER: Primary | ICD-10-CM

## 2018-08-13 DIAGNOSIS — I47.1 PSVT (PAROXYSMAL SUPRAVENTRICULAR TACHYCARDIA) (HCC): ICD-10-CM

## 2018-08-13 DIAGNOSIS — Z78.9 NON-SMOKER: ICD-10-CM

## 2018-08-13 DIAGNOSIS — G44.89 OTHER HEADACHE SYNDROME: ICD-10-CM

## 2018-08-13 PROCEDURE — 99213 OFFICE O/P EST LOW 20 MIN: CPT | Performed by: NURSE PRACTITIONER

## 2018-08-13 NOTE — PROGRESS NOTES
"    Chief complaint:   Chief Complaint   Patient presents with   • Headache     Vikas returns today for follow up for a closed odontoid fracture and a surgery back in February of this year, his only complaint today is headaches, states he would be doing fine if not for these headahces, he states he has at least 2 headaches a day, never had headaches before .         Subjective     HPI: This is a 67-year-old male gentleman who went to the operating room back in February 2018 for C1 to C4 posterior instrument effusion for an odontoid fracture type  2.  He is here in follow-up today.  He states that as far as his neck is concerned he feels like he is doing very well and not having any meaningful neck pain.  Not complaining of any arm pain.  He does have chronic low back pain as well as lower extremity pain but this is nothing new.  His biggest issue that he is having today is that he is having daily headaches that are present on the right side around the temporal region he says that he will get this twice a day.  He says that he can manage it with Tylenol or ibuprofen.  He says though that driving seems to make it worse.  He says that he never had a headache until after the surgery.  This is not typically go along with a tension-type headache given the location and that its just on the right side of his head.  Rates the pain a scale 0-10 at a 7 at its most severe point.  No nausea or vomiting with the headaches or photophobia.  No dizziness.    Review of Systems   Musculoskeletal: Negative.    Neurological: Positive for headaches.         Objective      Vital Signs  /85 (BP Location: Right arm, Patient Position: Sitting)   Ht 177.8 cm (70\")   Wt 79.7 kg (175 lb 12.8 oz)   BMI 25.22 kg/m²     Physical Exam   Constitutional: He is oriented to person, place, and time. He appears well-developed and well-nourished.   HENT:   Head: Normocephalic.   Eyes: Pupils are equal, round, and reactive to light. EOM are normal. "   Neck: Normal range of motion.   Pulmonary/Chest: Effort normal.   Musculoskeletal: Normal range of motion.   Neurological: He is alert and oriented to person, place, and time. He has normal strength and normal reflexes. No cranial nerve deficit or sensory deficit. Gait normal. GCS eye subscore is 4. GCS verbal subscore is 5. GCS motor subscore is 6.   Skin: Skin is warm.   Psychiatric: He has a normal mood and affect. His speech is normal and behavior is normal. Thought content normal.       Results Review: No new imaging          Assessment/Plan: At this point I think the patient would benefit from going to a neurologist for an evaluation regarding his headaches.  We will set him up for this to see if they can come up with a better treatment plan regarding his headaches.  From a neurosurgical standpoint think is doing fine regarding his neck at this point we will need see him on an as-needed basis.  We will shows that he is overweight.  BMI chart was given the patient.  He is a nonsmoker.         Vikas was seen today for headache.    Diagnoses and all orders for this visit:    Closed odontoid fracture with type II morphology, anterior displacement, and routine healing, subsequent encounter    Other headache syndrome  -     Ambulatory Referral to Neurology    Non-smoker    PSVT (paroxysmal supraventricular tachycardia) (CMS/[facility])    BMI 25.0-25.9,adult        I discussed the patients findings and my recommendations with patient  ROXIE Mcgarry  08/13/18  12:05 PM

## 2018-08-13 NOTE — PATIENT INSTRUCTIONS

## 2018-08-20 DIAGNOSIS — G47.00 INSOMNIA, UNSPECIFIED TYPE: Primary | ICD-10-CM

## 2018-08-20 RX ORDER — ZOLPIDEM TARTRATE 5 MG/1
5 TABLET ORAL NIGHTLY PRN
Qty: 30 TABLET | Refills: 3 | Status: SHIPPED | OUTPATIENT
Start: 2018-08-20 | End: 2018-10-08 | Stop reason: SDUPTHER

## 2018-09-10 DIAGNOSIS — G89.29 CHRONIC LOW BACK PAIN WITH SCIATICA, SCIATICA LATERALITY UNSPECIFIED, UNSPECIFIED BACK PAIN LATERALITY: Primary | ICD-10-CM

## 2018-09-10 DIAGNOSIS — M54.40 CHRONIC LOW BACK PAIN WITH SCIATICA, SCIATICA LATERALITY UNSPECIFIED, UNSPECIFIED BACK PAIN LATERALITY: Primary | ICD-10-CM

## 2018-09-10 RX ORDER — OXYCODONE HYDROCHLORIDE AND ACETAMINOPHEN 5; 325 MG/1; MG/1
1 TABLET ORAL EVERY 6 HOURS PRN
Qty: 90 TABLET | Refills: 0 | Status: SHIPPED | OUTPATIENT
Start: 2018-09-10 | End: 2018-10-08 | Stop reason: SDUPTHER

## 2018-09-21 DIAGNOSIS — N40.0 BENIGN PROSTATIC HYPERPLASIA, UNSPECIFIED WHETHER LOWER URINARY TRACT SYMPTOMS PRESENT: ICD-10-CM

## 2018-09-21 RX ORDER — TAMSULOSIN HYDROCHLORIDE 0.4 MG/1
CAPSULE ORAL
Qty: 30 CAPSULE | Refills: 5 | Status: SHIPPED | OUTPATIENT
Start: 2018-09-21

## 2018-10-08 DIAGNOSIS — N40.0 BENIGN PROSTATIC HYPERPLASIA, UNSPECIFIED WHETHER LOWER URINARY TRACT SYMPTOMS PRESENT: ICD-10-CM

## 2018-10-08 DIAGNOSIS — G89.29 CHRONIC LOW BACK PAIN WITH SCIATICA, SCIATICA LATERALITY UNSPECIFIED, UNSPECIFIED BACK PAIN LATERALITY: ICD-10-CM

## 2018-10-08 DIAGNOSIS — M54.40 CHRONIC LOW BACK PAIN WITH SCIATICA, SCIATICA LATERALITY UNSPECIFIED, UNSPECIFIED BACK PAIN LATERALITY: ICD-10-CM

## 2018-10-08 DIAGNOSIS — G47.00 INSOMNIA, UNSPECIFIED TYPE: ICD-10-CM

## 2018-10-08 RX ORDER — ZOLPIDEM TARTRATE 5 MG/1
5 TABLET ORAL NIGHTLY PRN
Qty: 30 TABLET | Refills: 3 | Status: SHIPPED | OUTPATIENT
Start: 2018-10-08 | End: 2018-11-07

## 2018-10-08 RX ORDER — OXYCODONE HYDROCHLORIDE AND ACETAMINOPHEN 5; 325 MG/1; MG/1
1 TABLET ORAL EVERY 6 HOURS PRN
Qty: 90 TABLET | Refills: 0 | Status: SHIPPED | OUTPATIENT
Start: 2018-10-08 | End: 2018-11-07

## 2018-10-18 ENCOUNTER — TELEPHONE (OUTPATIENT)
Dept: FAMILY MEDICINE CLINIC | Age: 68
End: 2018-10-18

## 2018-10-18 NOTE — TELEPHONE ENCOUNTER
Pt called demanding an apt with Dr Saurabh Lomeli, I advised him that Dr Saurabh Lomeli didn't have anything available until next week , pt states \"well i'll be dead by then\" I ask him what was going on and he said someone sold his pain medication. I asked him if he knew who stole it, he said he didn't that there was a lot of people in his house the other day. I told him about our policy that we cant replace lost or stolen medication, that if he got to bad to go to the ER. Pt states \"ER wont do anything for me\". I told him that I didn't want him to die and to go the the ER if needed to.  That's when the pt told me that \" I just signed his  \" and hung up

## 2018-10-29 ENCOUNTER — OFFICE VISIT (OUTPATIENT)
Dept: NEUROLOGY | Facility: CLINIC | Age: 68
End: 2018-10-29

## 2018-10-29 VITALS
HEART RATE: 78 BPM | SYSTOLIC BLOOD PRESSURE: 124 MMHG | WEIGHT: 177 LBS | BODY MASS INDEX: 25.34 KG/M2 | DIASTOLIC BLOOD PRESSURE: 76 MMHG | HEIGHT: 70 IN

## 2018-10-29 DIAGNOSIS — G44.89 OTHER HEADACHE SYNDROME: Primary | ICD-10-CM

## 2018-10-29 PROCEDURE — 99204 OFFICE O/P NEW MOD 45 MIN: CPT | Performed by: PHYSICIAN ASSISTANT

## 2018-10-29 RX ORDER — TRAZODONE HYDROCHLORIDE 50 MG/1
50 TABLET ORAL NIGHTLY
Qty: 30 TABLET | Refills: 3 | Status: SHIPPED | OUTPATIENT
Start: 2018-10-29 | End: 2019-02-22 | Stop reason: SDUPTHER

## 2018-10-29 RX ORDER — AMLODIPINE BESYLATE AND BENAZEPRIL HYDROCHLORIDE 2.5; 1 MG/1; MG/1
1 CAPSULE ORAL DAILY
COMMUNITY
Start: 2018-10-26 | End: 2020-02-13

## 2018-10-31 DIAGNOSIS — C61 PROSTATE CANCER (HCC): Primary | ICD-10-CM

## 2018-11-09 NOTE — PROGRESS NOTES
Subjective   Vikas Cruz is a 68 y.o. male is being seen for consultation today at the request of ROXIE Ardon    This patient has developed headaches, having onset after upper posterior cervical fusion for odontoid fracture, this is his most recent cervical fusion in the setting of multiple previous cervical fusions leaving him with cumulative fusion C1 to T1.  He relates no significant headache history.  Headaches are daily, primarily right sided, aggravated by activity - particularly while driving his 3 wheel motorcycle.        Headache    This is a new problem. The current episode started more than 1 month ago. The problem occurs daily. The problem has been gradually worsening. The pain is located in the right unilateral, frontal, retro-orbital, temporal and occipital region. The pain radiates to the right neck. The pain quality is similar to prior headaches. The quality of the pain is described as aching and sharp. The pain is severe. Associated symptoms include back pain, neck pain and scalp tenderness. Pertinent negatives include no phonophobia, photophobia, visual change or weakness. The symptoms are aggravated by activity and Valsalva. He has tried oral narcotics, acetaminophen and NSAIDs for the symptoms. The treatment provided mild relief. (Extensive/ multiple cervical fusions)        The following portions of the patient's history were reviewed and updated as appropriate: allergies, current medications, past family history, past medical history, past social history, past surgical history and problem list.    Review of Systems   Constitutional: Negative.    Eyes: Negative.  Negative for photophobia.   Respiratory: Negative.    Cardiovascular: Negative.    Gastrointestinal: Negative.    Endocrine: Negative.    Genitourinary: Negative.    Musculoskeletal: Positive for back pain, gait problem, neck pain and neck stiffness.   Skin: Negative.    Allergic/Immunologic: Negative.    Neurological:  Positive for headache. Negative for facial asymmetry, speech difficulty and weakness.   Hematological: Negative.    Psychiatric/Behavioral: Negative.        Objective   Physical Exam   Constitutional: He is oriented to person, place, and time.   HENT:   Head: Normocephalic.   Right Ear: Hearing and external ear normal.   Left Ear: Hearing and external ear normal.   Nose: Nose normal.   Mouth/Throat: Uvula is midline, oropharynx is clear and moist and mucous membranes are normal.   Eyes: Pupils are equal, round, and reactive to light. Conjunctivae, EOM and lids are normal.   Fundoscopic exam:       The right eye shows no hemorrhage and no papilledema. The right eye shows red reflex.        The left eye shows no hemorrhage and no papilledema. The left eye shows red reflex.   Neck: Trachea normal and phonation normal. No JVD present. Muscular tenderness present. Carotid bruit is not present. Decreased range of motion present. No thyroid mass present.   Cardiovascular: Normal rate, regular rhythm and normal heart sounds.    No murmur heard.  Pulmonary/Chest: Effort normal and breath sounds normal.   Abdominal: Soft. Normal appearance.   Musculoskeletal:        Cervical back: He exhibits decreased range of motion and pain.        Lumbar back: He exhibits decreased range of motion, tenderness and pain.   Neurological: He is alert and oriented to person, place, and time. He displays no atrophy and no tremor. A sensory deficit is present. No cranial nerve deficit. He exhibits normal muscle tone. Gait abnormal. Coordination normal. GCS eye subscore is 4. GCS verbal subscore is 5. GCS motor subscore is 6.   Reflex Scores:       Tricep reflexes are 2+ on the right side and 2+ on the left side.       Bicep reflexes are 2+ on the right side and 2+ on the left side.       Brachioradialis reflexes are 2+ on the right side and 2+ on the left side.       Patellar reflexes are 2+ on the right side and 2+ on the left side.        Achilles reflexes are 2+ on the right side and 2+ on the left side.  Skin: Skin is warm, dry and intact.   Psychiatric: He has a normal mood and affect. His speech is normal and behavior is normal. Judgment and thought content normal. Cognition and memory are normal.         Assessment/Plan   Vikas was seen today for headache.    Diagnoses and all orders for this visit:    Other headache syndrome  -     traZODone (DESYREL) 50 MG tablet; Take 1 tablet by mouth Every Night.    Medication recommendation as above, his headache is likely related to mechanical issues at the craniocervical junction.  These types of headaches are quite challenging and unfortunately, have a relatively poor prognosis for significant relief.

## 2018-11-28 LAB
AMPHETAMINES SCREEN BLOOD: NEGATIVE NG/ML
BARBITURATES SCREEN BLOOD: NEGATIVE NG/ML
BENZODIAZEPINES SCREEN BLOOD: NEGATIVE NG/ML
BUPRENORPHINE: NEGATIVE NG/ML
CANNABINOID SCREEN BLOOD: POSITIVE NG/ML
COCAINE SCREEN BLOOD: NEGATIVE NG/ML
Lab: NORMAL
METHADONE SCREEN BLOOD: NEGATIVE NG/ML
METHAMPHETAMINES SERUM/ PLASMA: NEGATIVE NG/ML
OPIATES SCREEN BLOOD: NEGATIVE NG/ML
OXYCODONE: POSITIVE NG/ML
PHENCYCLIDINE SCREEN BLOOD: NEGATIVE NG/ML

## 2018-12-11 DIAGNOSIS — C61 PROSTATE CANCER (HCC): ICD-10-CM

## 2018-12-11 LAB — PSA SERPL-MCNC: 6.58 NG/ML (ref 0–4)

## 2018-12-18 ENCOUNTER — OFFICE VISIT (OUTPATIENT)
Dept: UROLOGY | Facility: CLINIC | Age: 68
End: 2018-12-18

## 2018-12-18 VITALS — WEIGHT: 180 LBS | TEMPERATURE: 97 F | HEIGHT: 70 IN | BODY MASS INDEX: 25.77 KG/M2

## 2018-12-18 DIAGNOSIS — C61 PROSTATE CANCER (HCC): Primary | ICD-10-CM

## 2018-12-18 PROCEDURE — 99212 OFFICE O/P EST SF 10 MIN: CPT | Performed by: UROLOGY

## 2018-12-18 NOTE — PROGRESS NOTES
Mr. Cruz is 68 y.o. male    Chief Complaint   Patient presents with   • Prostate Cancer       History of Present Illness   He was initially diagnosed with prostate cancer about  6 month(s) ago. This was identified in the context of elevated psa.  Severity of the disease is best described as probable organ confined disease. Previous or current management includes active surveillance  Without having undergone initial surveillance biopsy. Associated symptoms include no evidence of irritative or obstructive voiding symptoms, gross hematuria, lower extremity swelling or weight loss. . Currently his PSA is  6.5.     The following portions of the patient's history were reviewed and updated as appropriate: allergies, current medications, past family history, past medical history, past social history, past surgical history and problem list.    Review of Systems   Constitutional: Negative for chills and fever.   Gastrointestinal: Negative for abdominal pain, anal bleeding and blood in stool.   Genitourinary: Positive for frequency. Negative for decreased urine volume, difficulty urinating, discharge, dysuria, enuresis, flank pain, genital sores, hematuria, penile pain, penile swelling, scrotal swelling, testicular pain and urgency.         Current Outpatient Medications:   •  oxyCODONE (ROXICODONE) 20 MG tablet, Take 1 tablet by mouth Every 8 (Eight) Hours As Needed for Moderate Pain . (Patient taking differently: Take 20 mg by mouth 3 (Three) Times a Day.), Disp: 90 tablet, Rfl: 0  •  ALPRAZolam (XANAX) 1 MG tablet, Take 1 mg by mouth Daily., Disp: , Rfl:   •  amLODIPine-benazepril (LOTREL 2.5-10) 2.5-10 MG per capsule, Take 1 capsule by mouth Daily., Disp: , Rfl:   •  carisoprodol (SOMA) 350 MG tablet, Take 350 mg by mouth As Needed., Disp: , Rfl:   •  traZODone (DESYREL) 50 MG tablet, Take 1 tablet by mouth Every Night., Disp: 30 tablet, Rfl: 3  •  zolpidem (AMBIEN) 5 MG tablet, At Night As Needed., Disp: , Rfl:     Past  "Medical History:   Diagnosis Date   • Anxiety    • Chronic kidney disease    • Chronic pain syndrome    • GI bleeding    • History of transfusion    • Hyperlipidemia    • Hypertension    • Injury of back    • Insomnia    • Neck injury    • PAF (paroxysmal atrial fibrillation) (CMS/HCC)    • Therapeutic opioid induced constipation        Past Surgical History:   Procedure Laterality Date   • BACK SURGERY     • ODONTOID FRACTURE SURGERY         Social History     Socioeconomic History   • Marital status:      Spouse name: Not on file   • Number of children: Not on file   • Years of education: Not on file   • Highest education level: Not on file   Tobacco Use   • Smoking status: Never Smoker   • Smokeless tobacco: Never Used   Substance and Sexual Activity   • Alcohol use: No   • Drug use: No   • Sexual activity: Defer       Family History   Problem Relation Age of Onset   • Cancer Mother    • No Known Problems Father        Objective    Temp 97 °F (36.1 °C)   Ht 177.8 cm (70\")   Wt 81.6 kg (180 lb)   BMI 25.83 kg/m²     Physical Exam   Genitourinary:   Genitourinary Comments: 40 prostate, firm       Orders Only on 12/11/2018   Component Date Value Ref Range Status   • PSA 12/11/2018 6.580* 0.000 - 4.000 ng/mL Final       Results for orders placed or performed in visit on 12/11/18   PSA DIAGNOSTIC   Result Value Ref Range    PSA 6.580 (H) 0.000 - 4.000 ng/mL     Patient's Body mass index is 25.83 kg/m². BMI is above normal parameters. Recommendations include: educational material.    Assessment and Plan    Vikas was seen today for prostate cancer.    Diagnoses and all orders for this visit:    Prostate cancer (CMS/HCC)  -     PSA DIAGNOSTIC; Future    PSA 6.5.  Exam unchanged.  Continue with active surveillance.  I will see him back in 6 months with a repeat PSA.  He will be due for a prostate biopsy that time.  Depending on his PSA, consider MRI fusion prostate biopsy.  "

## 2018-12-18 NOTE — PATIENT INSTRUCTIONS

## 2019-02-22 DIAGNOSIS — G44.89 OTHER HEADACHE SYNDROME: ICD-10-CM

## 2019-02-22 RX ORDER — TRAZODONE HYDROCHLORIDE 50 MG/1
TABLET ORAL
Qty: 30 TABLET | Refills: 5 | Status: SHIPPED | OUTPATIENT
Start: 2019-02-22 | End: 2019-03-08 | Stop reason: SDUPTHER

## 2019-03-08 ENCOUNTER — OFFICE VISIT (OUTPATIENT)
Dept: NEUROLOGY | Facility: CLINIC | Age: 69
End: 2019-03-08

## 2019-03-08 VITALS
WEIGHT: 165 LBS | BODY MASS INDEX: 23.62 KG/M2 | DIASTOLIC BLOOD PRESSURE: 78 MMHG | SYSTOLIC BLOOD PRESSURE: 118 MMHG | HEART RATE: 74 BPM | HEIGHT: 70 IN

## 2019-03-08 DIAGNOSIS — G44.89 OTHER HEADACHE SYNDROME: Primary | ICD-10-CM

## 2019-03-08 DIAGNOSIS — I67.9 CEREBROVASCULAR SMALL VESSEL DISEASE: ICD-10-CM

## 2019-03-08 DIAGNOSIS — S12.110D CLOSED ODONTOID FRACTURE WITH TYPE II MORPHOLOGY, ANTERIOR DISPLACEMENT, AND ROUTINE HEALING, SUBSEQUENT ENCOUNTER: ICD-10-CM

## 2019-03-08 DIAGNOSIS — M48.062 SPINAL STENOSIS OF LUMBAR REGION WITH NEUROGENIC CLAUDICATION: ICD-10-CM

## 2019-03-08 PROCEDURE — 99214 OFFICE O/P EST MOD 30 MIN: CPT | Performed by: PHYSICIAN ASSISTANT

## 2019-03-08 RX ORDER — PENTOXIFYLLINE 400 MG/1
400 TABLET, EXTENDED RELEASE ORAL
Qty: 90 TABLET | Refills: 3 | Status: SHIPPED | OUTPATIENT
Start: 2019-03-08 | End: 2019-07-01

## 2019-03-08 RX ORDER — TRAZODONE HYDROCHLORIDE 50 MG/1
100 TABLET ORAL
Qty: 60 TABLET | Refills: 5 | Status: SHIPPED | OUTPATIENT
Start: 2019-03-08 | End: 2019-09-16 | Stop reason: SDUPTHER

## 2019-03-25 DIAGNOSIS — N40.0 BENIGN PROSTATIC HYPERPLASIA, UNSPECIFIED WHETHER LOWER URINARY TRACT SYMPTOMS PRESENT: ICD-10-CM

## 2019-03-25 RX ORDER — TAMSULOSIN HYDROCHLORIDE 0.4 MG/1
CAPSULE ORAL
Qty: 30 CAPSULE | Refills: 5 | OUTPATIENT
Start: 2019-03-25

## 2019-03-25 NOTE — PROGRESS NOTES
Subjective   Vikas Cruz is a 68 y.o. male is here today for follow-up.    This patient has developed headaches, having onset after upper posterior cervical fusion for odontoid fracture, this is his most recent cervical fusion in the setting of multiple previous cervical fusions leaving him with cumulative fusion C1 to T1.  He relates no significant headache history.  Headaches are daily, primarily right sided, aggravated by activity - particularly while driving his 3 wheel motorcycle.    He has known lumbar spinal stenosis with neurogenic claudication, these symptoms are worse and have been refractory to medical management thus far.      Headache    This is a new problem. The current episode started more than 1 month ago. The problem occurs daily. The problem has been gradually worsening. The pain is located in the right unilateral, frontal, retro-orbital, temporal and occipital region. The pain radiates to the right neck. The pain quality is similar to prior headaches. The quality of the pain is described as aching and sharp. The pain is severe. Associated symptoms include back pain, neck pain and scalp tenderness. Pertinent negatives include no phonophobia, visual change or weakness. The symptoms are aggravated by activity and Valsalva. He has tried oral narcotics, acetaminophen and NSAIDs for the symptoms. The treatment provided mild relief. (Extensive/ multiple cervical fusions)        The following portions of the patient's history were reviewed and updated as appropriate: allergies, current medications, past family history, past medical history, past social history, past surgical history and problem list.    Review of Systems   Constitutional: Negative.    Eyes: Negative.    Respiratory: Negative.    Cardiovascular: Negative.    Gastrointestinal: Negative.    Endocrine: Negative.    Genitourinary: Negative.    Musculoskeletal: Positive for back pain, gait problem, neck pain and neck stiffness.   Skin:  Negative.    Allergic/Immunologic: Negative.    Neurological: Positive for headache. Negative for facial asymmetry, speech difficulty and weakness.   Hematological: Negative.    Psychiatric/Behavioral: Negative.        Objective   Physical Exam   Constitutional: He is oriented to person, place, and time.   HENT:   Head: Normocephalic.   Right Ear: Hearing and external ear normal.   Left Ear: Hearing and external ear normal.   Nose: Nose normal.   Mouth/Throat: Uvula is midline, oropharynx is clear and moist and mucous membranes are normal.   Eyes: Conjunctivae, EOM and lids are normal. Pupils are equal, round, and reactive to light.   Fundoscopic exam:       The right eye shows no hemorrhage and no papilledema. The right eye shows red reflex.        The left eye shows no hemorrhage and no papilledema. The left eye shows red reflex.   Neck: Trachea normal and phonation normal. No JVD present. Muscular tenderness present. Carotid bruit is not present. Decreased range of motion present. No thyroid mass present.   Cardiovascular: Normal rate, regular rhythm and normal heart sounds.   No murmur heard.  Pulmonary/Chest: Effort normal and breath sounds normal.   Abdominal: Soft. Normal appearance.   Musculoskeletal:        Cervical back: He exhibits decreased range of motion and pain.        Lumbar back: He exhibits decreased range of motion, tenderness and pain.   Neurological: He is alert and oriented to person, place, and time. He displays no atrophy and no tremor. A sensory deficit is present. No cranial nerve deficit. He exhibits normal muscle tone. Gait abnormal. Coordination normal. GCS eye subscore is 4. GCS verbal subscore is 5. GCS motor subscore is 6.   Reflex Scores:       Tricep reflexes are 2+ on the right side and 2+ on the left side.       Bicep reflexes are 2+ on the right side and 2+ on the left side.       Brachioradialis reflexes are 2+ on the right side and 2+ on the left side.       Patellar reflexes  are 2+ on the right side and 2+ on the left side.       Achilles reflexes are 2+ on the right side and 2+ on the left side.  Skin: Skin is warm, dry and intact.   Psychiatric: He has a normal mood and affect. His speech is normal and behavior is normal. Judgment and thought content normal. Cognition and memory are normal.         Assessment/Plan   Vikas was seen today for headache.    Diagnoses and all orders for this visit:    Other headache syndrome  -     traZODone (DESYREL) 50 MG tablet; Take 2 tablets by mouth every night at bedtime.    Spinal stenosis of lumbar region with neurogenic claudication  -     pentoxifylline (TRENtal) 400 MG CR tablet; Take 1 tablet by mouth 3 (Three) Times a Day With Meals.  -     aspirin 81 MG tablet; Take 1 tablet by mouth Daily.  -     traZODone (DESYREL) 50 MG tablet; Take 2 tablets by mouth every night at bedtime.    Cerebrovascular small vessel disease  -     pentoxifylline (TRENtal) 400 MG CR tablet; Take 1 tablet by mouth 3 (Three) Times a Day With Meals.  -     aspirin 81 MG tablet; Take 1 tablet by mouth Daily.    Closed odontoid fracture with type II morphology, anterior displacement, and routine healing, subsequent encounter  -     traZODone (DESYREL) 50 MG tablet; Take 2 tablets by mouth every night at bedtime.    Recommendations as above.  Discussed in detail with the patient and all questions reviewed.      20 minutes of a 25 minute outpatient visit was spent in counseling and coordination of care today.

## 2019-05-10 DIAGNOSIS — C61 PROSTATE CANCER (HCC): ICD-10-CM

## 2019-05-11 LAB — PSA SERPL-MCNC: 6.56 NG/ML (ref 0–4)

## 2019-06-18 ENCOUNTER — OFFICE VISIT (OUTPATIENT)
Dept: UROLOGY | Facility: CLINIC | Age: 69
End: 2019-06-18

## 2019-06-18 VITALS — BODY MASS INDEX: 23.62 KG/M2 | WEIGHT: 165 LBS | HEIGHT: 70 IN | TEMPERATURE: 98 F

## 2019-06-18 DIAGNOSIS — C61 PROSTATE CANCER (HCC): Primary | ICD-10-CM

## 2019-06-18 DIAGNOSIS — R39.12 BENIGN PROSTATIC HYPERPLASIA WITH WEAK URINARY STREAM: ICD-10-CM

## 2019-06-18 DIAGNOSIS — N40.1 BENIGN PROSTATIC HYPERPLASIA WITH WEAK URINARY STREAM: ICD-10-CM

## 2019-06-18 PROCEDURE — 51798 US URINE CAPACITY MEASURE: CPT | Performed by: UROLOGY

## 2019-06-18 PROCEDURE — 99213 OFFICE O/P EST LOW 20 MIN: CPT | Performed by: UROLOGY

## 2019-06-18 NOTE — PROGRESS NOTES
Mr. Cruz is 68 y.o. male    Chief Complaint   Patient presents with   • Prostate Cancer   • Benign Prostatic Hypertrophy       History of Present Illness   He was initially diagnosed with prostate cancer about  1 year(s) ago. This was identified in the context of elevated psa and prostate nodule.  Severity of the disease is best described as probable organ confined disease. Previous or current management includes active surveillance  Without having undergone initial surveillance biopsy. Associated symptoms include weak urinary stream. Currently his PSA is 6.5.     The following portions of the patient's history were reviewed and updated as appropriate: allergies, current medications, past family history, past medical history, past social history, past surgical history and problem list.    Review of Systems   Constitutional: Negative for chills and fever.   Gastrointestinal: Negative for abdominal pain, anal bleeding and blood in stool.   Genitourinary: Positive for difficulty urinating. Negative for decreased urine volume, discharge, dysuria, enuresis, flank pain, frequency, genital sores, hematuria, penile pain, penile swelling, scrotal swelling, testicular pain and urgency.       I have reviewed the review of systems      Current Outpatient Medications:   •  amLODIPine-benazepril (LOTREL 2.5-10) 2.5-10 MG per capsule, Take 1 capsule by mouth Daily., Disp: , Rfl:   •  aspirin 81 MG tablet, Take 1 tablet by mouth Daily., Disp: , Rfl:   •  oxyCODONE (ROXICODONE) 20 MG tablet, Take 1 tablet by mouth Every 8 (Eight) Hours As Needed for Moderate Pain . (Patient taking differently: Take 20 mg by mouth 3 (Three) Times a Day.), Disp: 90 tablet, Rfl: 0  •  pentoxifylline (TRENtal) 400 MG CR tablet, Take 1 tablet by mouth 3 (Three) Times a Day With Meals., Disp: 90 tablet, Rfl: 3  •  traZODone (DESYREL) 50 MG tablet, Take 2 tablets by mouth every night at bedtime., Disp: 60 tablet, Rfl: 5    Past Medical History:  "  Diagnosis Date   • Anxiety    • Chronic kidney disease    • Chronic pain syndrome    • GI bleeding    • History of transfusion    • Hyperlipidemia    • Hypertension    • Injury of back    • Insomnia    • Neck injury    • PAF (paroxysmal atrial fibrillation) (CMS/HCC)    • Therapeutic opioid induced constipation        Past Surgical History:   Procedure Laterality Date   • BACK SURGERY     • CERVICAL LAMINECTOMY DECOMPRESSION POSTERIOR N/A 2/27/2018    Procedure: CERVICAL  POSTERIOR INSTRUMENTED FUSION C1-4;  Surgeon: Bandar Donis MD;  Location: [facility];  Service:    • ODONTOID FRACTURE SURGERY         Social History     Socioeconomic History   • Marital status:      Spouse name: Not on file   • Number of children: Not on file   • Years of education: Not on file   • Highest education level: Not on file   Tobacco Use   • Smoking status: Never Smoker   • Smokeless tobacco: Never Used   Substance and Sexual Activity   • Alcohol use: No   • Drug use: No   • Sexual activity: Defer       Family History   Problem Relation Age of Onset   • Cancer Mother    • No Known Problems Father        Objective    Temp 98 °F (36.7 °C)   Ht 177.8 cm (70\")   Wt 74.8 kg (165 lb)   BMI 23.68 kg/m²     Physical Exam    Orders Only on 05/10/2019   Component Date Value Ref Range Status   • PSA 05/10/2019 6.560* 0.000 - 4.000 ng/mL Final       Results for orders placed or performed in visit on 05/10/19   PSA DIAGNOSTIC   Result Value Ref Range    PSA 6.560 (H) 0.000 - 4.000 ng/mL     Patient's Body mass index is 23.68 kg/m². BMI is within normal parameters. No follow-up required..  Bladder scan    Post void residuEstimation of residual urine via abdominal ultrasound  Residual Urine: 40 ml  Indication: weak stream  Position: Supine  Examination: Incremental scanning of the suprapubic area using 3 MHz transducer using copious amounts of acoustic gel.   Findings: An anechoic area was demonstrated which represented the bladder, " with measurement of residual urine as noted. I inspected this myself. In that the residual urine was stable or insignificant, no treatment will be necessary at this time.       Assessment and Plan    Vikas was seen today for prostate cancer and benign prostatic hypertrophy.    Diagnoses and all orders for this visit:    Prostate cancer (CMS/[facility])  -     MRI Pelvis With & Without Contrast; Future    Benign prostatic hyperplasia with weak urinary stream    Prostate cancer, BPH, to chronic stable conditions.  With regard to his prostate cancer, his PSA is 6.5 which is stable for him.  He is on active surveillance for low risk prostate cancer without having an initial active surveillance biopsy.  I would like to get an MRI and set him up for an MRI fusion biopsy.    Patient does have a history of BPH with a history of urinary retention.  He is having some issues with weak stream today.  His postvoid residual today is 40 cc he is emptying his bladder well.  May address this further in the future after his active surveillance biopsy.  He was initially on Flomax after his hospitalization but was taken off by his primary care physician

## 2019-06-24 ENCOUNTER — HOSPITAL ENCOUNTER (OUTPATIENT)
Dept: MRI IMAGING | Facility: HOSPITAL | Age: 69
Discharge: HOME | End: 2019-06-24

## 2019-06-24 DIAGNOSIS — C61 PROSTATE CANCER (HCC): ICD-10-CM

## 2019-06-24 LAB — CREAT BLDA-MCNC: 1.1 MG/DL (ref 0.6–1.3)

## 2019-06-24 PROCEDURE — 82565 ASSAY OF CREATININE: CPT

## 2019-07-01 ENCOUNTER — OFFICE VISIT (OUTPATIENT)
Dept: NEUROLOGY | Facility: CLINIC | Age: 69
End: 2019-07-01

## 2019-07-01 VITALS
DIASTOLIC BLOOD PRESSURE: 78 MMHG | BODY MASS INDEX: 21.76 KG/M2 | SYSTOLIC BLOOD PRESSURE: 122 MMHG | HEART RATE: 100 BPM | WEIGHT: 152 LBS | HEIGHT: 70 IN

## 2019-07-01 DIAGNOSIS — G44.89 OTHER HEADACHE SYNDROME: ICD-10-CM

## 2019-07-01 DIAGNOSIS — I67.9 CEREBROVASCULAR SMALL VESSEL DISEASE: Primary | ICD-10-CM

## 2019-07-01 DIAGNOSIS — M79.2 NEURALGIA: ICD-10-CM

## 2019-07-01 DIAGNOSIS — M48.062 SPINAL STENOSIS OF LUMBAR REGION WITH NEUROGENIC CLAUDICATION: ICD-10-CM

## 2019-07-01 PROCEDURE — 99214 OFFICE O/P EST MOD 30 MIN: CPT | Performed by: PHYSICIAN ASSISTANT

## 2019-07-01 RX ORDER — MEXILETINE HYDROCHLORIDE 150 MG/1
150 CAPSULE ORAL 2 TIMES DAILY
Qty: 60 CAPSULE | Refills: 4 | Status: SHIPPED | OUTPATIENT
Start: 2019-07-01 | End: 2019-08-07 | Stop reason: SDUPTHER

## 2019-07-01 RX ORDER — OMEPRAZOLE 20 MG/1
20 CAPSULE, DELAYED RELEASE ORAL DAILY
COMMUNITY
Start: 2019-06-11 | End: 2019-08-07

## 2019-07-01 RX ORDER — ATORVASTATIN CALCIUM 10 MG/1
10 TABLET, FILM COATED ORAL DAILY
COMMUNITY
Start: 2019-06-12 | End: 2020-02-13

## 2019-07-01 NOTE — PROGRESS NOTES
Subjective   Vikas Cruz is a 68 y.o. male is here today for follow-up.    This patient has developed headaches, having onset after upper posterior cervical fusion for odontoid fracture, this is his most recent cervical fusion in the setting of multiple previous cervical fusions leaving him with cumulative fusion C1 to T1.  Headaches are daily, primarily right sided, aggravated by activity - particularly while driving his 3 wheel motorcycle.    He has known lumbar spinal stenosis with neurogenic claudication, these symptoms are worse and have been refractory to medical management thus far.  He continues to be quite limited with regard to his ambulation.      Headache    This is a chronic problem. The current episode started more than 1 year ago. The problem occurs daily. The problem has been gradually worsening. The pain is located in the right unilateral, frontal, retro-orbital, temporal and occipital region. The pain radiates to the right neck. The pain quality is similar to prior headaches. The quality of the pain is described as aching and sharp. The pain is severe. Associated symptoms include back pain, neck pain, numbness, scalp tenderness and weakness. The symptoms are aggravated by activity and Valsalva. He has tried oral narcotics, acetaminophen and NSAIDs for the symptoms. The treatment provided mild relief. (Extensive cervical fusion)        The following portions of the patient's history were reviewed and updated as appropriate: allergies, current medications, past family history, past medical history, past social history, past surgical history and problem list.    Review of Systems   Constitutional: Positive for fatigue.   HENT: Positive for trouble swallowing.    Eyes: Negative.    Respiratory: Negative.    Cardiovascular: Negative.    Gastrointestinal: Negative.    Endocrine: Negative.    Genitourinary: Negative.    Musculoskeletal: Positive for back pain, gait problem, neck pain and neck stiffness.    Skin: Negative.    Allergic/Immunologic: Negative.    Neurological: Positive for tremors, weakness, numbness and headache. Negative for speech difficulty.   Hematological: Negative.    Psychiatric/Behavioral: Negative.        Objective   Physical Exam   Constitutional: He is oriented to person, place, and time.   HENT:   Head: Normocephalic.   Right Ear: Hearing and external ear normal.   Left Ear: Hearing and external ear normal.   Nose: Nose normal.   Mouth/Throat: Uvula is midline, oropharynx is clear and moist and mucous membranes are normal.   Eyes: Conjunctivae, EOM and lids are normal. Pupils are equal, round, and reactive to light.   Neck: Trachea normal and phonation normal. No JVD present. Muscular tenderness present. Carotid bruit is not present. Decreased range of motion present.   Cardiovascular: Normal rate, regular rhythm and normal heart sounds.   No murmur heard.  Pulmonary/Chest: Effort normal and breath sounds normal.   Musculoskeletal:        Cervical back: He exhibits decreased range of motion and pain.        Lumbar back: He exhibits decreased range of motion, tenderness and pain.   Neurological: He is alert and oriented to person, place, and time. He displays no atrophy and no tremor. A sensory deficit is present. No cranial nerve deficit. He exhibits normal muscle tone. Gait abnormal. Coordination normal. GCS eye subscore is 4. GCS verbal subscore is 5. GCS motor subscore is 6.   Reflex Scores:       Bicep reflexes are 2+ on the right side and 2+ on the left side.       Brachioradialis reflexes are 2+ on the right side and 2+ on the left side.       Patellar reflexes are 2+ on the right side and 2+ on the left side.  Skin: Skin is warm, dry and intact.   Psychiatric: He has a normal mood and affect. His speech is normal and behavior is normal. Judgment and thought content normal. Cognition and memory are normal.   Nursing note and vitals reviewed.        Assessment/Plan   Vikas was seen  today for headache and extremity weakness.    Diagnoses and all orders for this visit:    Cerebrovascular small vessel disease    Other headache syndrome    Spinal stenosis of lumbar region with neurogenic claudication  -     mexiletine (MEXITIL) 150 MG capsule; Take 1 capsule by mouth 2 (Two) Times a Day.    Neuralgia  -     mexiletine (MEXITIL) 150 MG capsule; Take 1 capsule by mouth 2 (Two) Times a Day.    Patient has had unsatisfactory relief with multiple neuralgic pain medications.  We have had a long conversation about other options.  We have decided on a trial of mexiletine 150 mg daily, increasing to 300 mg daily after a week.  His most recent EKG was normal.    Recommendations, medication indications and potential reactions, safety precautions, follow-up recommendations are reviewed in detail.      20 minutes of a 25 minute outpatient visit was spent in counseling and coordination of care today.           Dictated utilizing Dragon dictation.

## 2019-07-02 ENCOUNTER — TELEPHONE (OUTPATIENT)
Dept: UROLOGY | Facility: CLINIC | Age: 69
End: 2019-07-02

## 2019-07-02 NOTE — TELEPHONE ENCOUNTER
Pt came in for his appt and did not get message that appt was cancelled.  He stated he went for MRI but they told him he would have to get something to make his legs calm down before they can do the MRI.  He was unable to complete MRI due to active legs.    Thanks,  Miguel

## 2019-07-03 NOTE — TELEPHONE ENCOUNTER
Spoke with the patient, he said he just cannot keep his legs still and they didn't give him info on what might help it. I will discuss with Dr. Rivera to see what he would like to do further.

## 2019-07-11 DIAGNOSIS — I67.9 CEREBROVASCULAR SMALL VESSEL DISEASE: ICD-10-CM

## 2019-07-11 DIAGNOSIS — M48.062 SPINAL STENOSIS OF LUMBAR REGION WITH NEUROGENIC CLAUDICATION: ICD-10-CM

## 2019-07-11 RX ORDER — PENTOXIFYLLINE 400 MG/1
TABLET, EXTENDED RELEASE ORAL
Qty: 90 TABLET | Refills: 3 | OUTPATIENT
Start: 2019-07-11

## 2019-07-24 DIAGNOSIS — C61 PROSTATE CANCER (HCC): Primary | ICD-10-CM

## 2019-07-24 RX ORDER — ALPRAZOLAM 1 MG/1
1 TABLET ORAL
Qty: 1 TABLET | Refills: 0 | Status: SHIPPED | OUTPATIENT
Start: 2019-07-24 | End: 2019-07-24

## 2019-07-24 NOTE — PROGRESS NOTES
Patient could not tolerate the MRI due to neuropathy and restless legs.  We discussed options and he is taken Xanax in the past which has helped him.  I written him 1 pill of the 1 mg Xanax to take prior to his MRI.  He previously tolerated this medication.  He did express an understanding that he cannot drive if he takes this medication and so he will need a  for his MRI.  I have asked my staff to reschedule his MRI and have him follow-up in the office afterwards

## 2019-08-07 ENCOUNTER — TELEPHONE (OUTPATIENT)
Dept: UROLOGY | Facility: CLINIC | Age: 69
End: 2019-08-07

## 2019-08-07 ENCOUNTER — OFFICE VISIT (OUTPATIENT)
Dept: NEUROLOGY | Facility: CLINIC | Age: 69
End: 2019-08-07

## 2019-08-07 VITALS
HEIGHT: 70 IN | BODY MASS INDEX: 20.9 KG/M2 | DIASTOLIC BLOOD PRESSURE: 80 MMHG | WEIGHT: 146 LBS | SYSTOLIC BLOOD PRESSURE: 120 MMHG | HEART RATE: 105 BPM

## 2019-08-07 DIAGNOSIS — M48.062 SPINAL STENOSIS OF LUMBAR REGION WITH NEUROGENIC CLAUDICATION: ICD-10-CM

## 2019-08-07 DIAGNOSIS — M79.2 NEURALGIA: ICD-10-CM

## 2019-08-07 DIAGNOSIS — I67.9 CEREBROVASCULAR SMALL VESSEL DISEASE: Primary | ICD-10-CM

## 2019-08-07 DIAGNOSIS — G44.89 OTHER HEADACHE SYNDROME: ICD-10-CM

## 2019-08-07 PROCEDURE — 99214 OFFICE O/P EST MOD 30 MIN: CPT | Performed by: PHYSICIAN ASSISTANT

## 2019-08-07 RX ORDER — MEXILETINE HYDROCHLORIDE 150 MG/1
300 CAPSULE ORAL 2 TIMES DAILY
Qty: 120 CAPSULE | Refills: 5 | Status: SHIPPED | OUTPATIENT
Start: 2019-08-07 | End: 2020-01-27

## 2019-08-07 NOTE — TELEPHONE ENCOUNTER
----- Message from Gilberto Bryant sent at 7/24/2019  1:34 PM CDT -----  Dr rivera called [facility]. Mri needs reschd and schd with dr haque  ----- Message -----  From: Sharad Rivera MD  Sent: 7/24/2019  11:21 AM  To: Gilberto Bryant    I wrote him for his Xanax to see if he will be able to do the MRI with this.  Please cancel his appointment today with me this afternoon and reschedule his MRI as soon as possible and follow-up with Dr. Haque afterwards

## 2019-08-07 NOTE — TELEPHONE ENCOUNTER
I called patient to set up follow-up for the MRI results. It will need to be at least a week after the MRI.

## 2019-08-07 NOTE — PROGRESS NOTES
Subjective   Vikas Cruz is a 68 y.o. male is here today for follow-up.    This patient has developed headaches, having onset after upper posterior cervical fusion for odontoid fracture, this is his most recent cervical fusion in the setting of multiple previous cervical fusions leaving him with cumulative fusion C1 to T1.  Headaches are daily, primarily right sided, aggravated by activity - particularly while driving his 3 wheel motorcycle.    He has known lumbar spinal stenosis with neurogenic claudication, these symptoms are worse and have been refractory to medical management thus far.  He continues to be quite limited with regard to his ambulation.    He has noticed some response to mexiletine.  Previously, the patient has been on several neuropathic pain medications including Lyrica, tricyclic antidepressants, gabapentin without relief.      Headache    This is a chronic problem. The current episode started more than 1 year ago. The problem occurs daily. The problem has been gradually worsening. The pain is located in the right unilateral, frontal, retro-orbital, temporal and occipital region. The pain radiates to the right neck. The pain quality is similar to prior headaches. The quality of the pain is described as aching and sharp. The pain is severe. Associated symptoms include back pain, neck pain, numbness, scalp tenderness and weakness. The symptoms are aggravated by activity and Valsalva. He has tried oral narcotics, acetaminophen and NSAIDs for the symptoms. The treatment provided mild relief. (Extensive cervical fusion)        The following portions of the patient's history were reviewed and updated as appropriate: allergies, current medications, past family history, past medical history, past social history, past surgical history and problem list.    Review of Systems   Constitutional: Positive for fatigue.   HENT: Positive for trouble swallowing.    Eyes: Negative.    Respiratory: Negative.     Cardiovascular: Negative.    Gastrointestinal: Negative.    Endocrine: Negative.    Genitourinary: Negative.    Musculoskeletal: Positive for back pain, gait problem, neck pain and neck stiffness.   Skin: Negative.    Allergic/Immunologic: Negative.    Neurological: Positive for tremors, weakness, numbness and headache. Negative for speech difficulty.   Hematological: Negative.    Psychiatric/Behavioral: Negative.        Objective   Physical Exam   Constitutional: He is oriented to person, place, and time.   HENT:   Head: Normocephalic.   Right Ear: Hearing and external ear normal.   Left Ear: Hearing and external ear normal.   Nose: Nose normal.   Mouth/Throat: Uvula is midline, oropharynx is clear and moist and mucous membranes are normal.   Eyes: Conjunctivae, EOM and lids are normal. Pupils are equal, round, and reactive to light.   Neck: Trachea normal and phonation normal. No JVD present. Muscular tenderness present. Carotid bruit is not present. Decreased range of motion present.   Cardiovascular: Normal rate, regular rhythm and normal heart sounds.   No murmur heard.  Pulmonary/Chest: Effort normal and breath sounds normal.   Musculoskeletal:        Cervical back: He exhibits decreased range of motion and pain.        Lumbar back: He exhibits decreased range of motion, tenderness and pain.   Neurological: He is alert and oriented to person, place, and time. He displays no atrophy and no tremor. A sensory deficit is present. No cranial nerve deficit. He exhibits normal muscle tone. Gait abnormal. Coordination normal. GCS eye subscore is 4. GCS verbal subscore is 5. GCS motor subscore is 6.   Reflex Scores:       Bicep reflexes are 2+ on the right side and 2+ on the left side.       Brachioradialis reflexes are 2+ on the right side and 2+ on the left side.       Patellar reflexes are 2+ on the right side and 2+ on the left side.  Skin: Skin is warm, dry and intact.   Psychiatric: He has a normal mood and  affect. His speech is normal and behavior is normal. Judgment and thought content normal. Cognition and memory are normal.   Nursing note and vitals reviewed.        Assessment/Plan   Diagnoses and all orders for this visit:    Cerebrovascular small vessel disease    Other headache syndrome    Spinal stenosis of lumbar region with neurogenic claudication    Neuralgia    Will increase mexiletine to 300 mg twice per day, this dose remains below 10 mg/kg/day recommendation for neuropathic pain.    No other changes were made in his medication regimen today.    The patient's concerns and questions, today's findings and recommendations, medication recommendations, signs and symptoms which should prompt a call to the office or seeking medical attention are reviewed.      20 minutes of a 25 minute outpatient visit was spent in counseling and coordination of care today.           Dictated utilizing Dragon dictation.

## 2019-08-08 ENCOUNTER — APPOINTMENT (OUTPATIENT)
Dept: MRI IMAGING | Facility: HOSPITAL | Age: 69
End: 2019-08-08

## 2019-08-12 ENCOUNTER — HOSPITAL ENCOUNTER (EMERGENCY)
Facility: HOSPITAL | Age: 69
Discharge: HOME | End: 2019-08-12
Attending: EMERGENCY MEDICINE | Admitting: EMERGENCY MEDICINE

## 2019-08-12 ENCOUNTER — APPOINTMENT (OUTPATIENT)
Dept: MRI IMAGING | Facility: HOSPITAL | Age: 69
End: 2019-08-12

## 2019-08-12 VITALS
HEART RATE: 80 BPM | HEIGHT: 70 IN | BODY MASS INDEX: 19.76 KG/M2 | SYSTOLIC BLOOD PRESSURE: 165 MMHG | RESPIRATION RATE: 16 BRPM | TEMPERATURE: 97.6 F | WEIGHT: 138 LBS | DIASTOLIC BLOOD PRESSURE: 81 MMHG | OXYGEN SATURATION: 100 %

## 2019-08-12 DIAGNOSIS — F11.20 NARCOTIC DEPENDENCE (HCC): Primary | ICD-10-CM

## 2019-08-12 LAB
ALBUMIN SERPL-MCNC: 4.2 G/DL (ref 3.5–5)
ALBUMIN/GLOB SERPL: 1.4 G/DL (ref 1.1–2.5)
ALP SERPL-CCNC: 123 U/L (ref 24–120)
ALT SERPL W P-5'-P-CCNC: 22 U/L (ref 0–54)
AMPHET+METHAMPHET UR QL: POSITIVE
ANION GAP SERPL CALCULATED.3IONS-SCNC: 6 MMOL/L (ref 4–13)
AST SERPL-CCNC: 33 U/L (ref 7–45)
BARBITURATES UR QL SCN: NEGATIVE
BASOPHILS # BLD AUTO: 0.04 10*3/MM3 (ref 0–0.2)
BASOPHILS NFR BLD AUTO: 0.7 % (ref 0–1.5)
BENZODIAZ UR QL SCN: NEGATIVE
BILIRUB SERPL-MCNC: 0.7 MG/DL (ref 0.1–1)
BUN BLD-MCNC: 18 MG/DL (ref 5–21)
BUN/CREAT SERPL: 15 (ref 7–25)
CALCIUM SPEC-SCNC: 9.7 MG/DL (ref 8.4–10.4)
CANNABINOIDS SERPL QL: POSITIVE
CHLORIDE SERPL-SCNC: 97 MMOL/L (ref 98–110)
CO2 SERPL-SCNC: 34 MMOL/L (ref 24–31)
COCAINE UR QL: NEGATIVE
CREAT BLD-MCNC: 1.2 MG/DL (ref 0.5–1.4)
DEPRECATED RDW RBC AUTO: 44.1 FL (ref 37–54)
EOSINOPHIL # BLD AUTO: 0.04 10*3/MM3 (ref 0–0.4)
EOSINOPHIL NFR BLD AUTO: 0.7 % (ref 0.3–6.2)
ERYTHROCYTE [DISTWIDTH] IN BLOOD BY AUTOMATED COUNT: 13.5 % (ref 12.3–15.4)
ETHANOL UR QL: <0.01 %
GFR SERPL CREATININE-BSD FRML MDRD: 60 ML/MIN/1.73
GLOBULIN UR ELPH-MCNC: 3 GM/DL
GLUCOSE BLD-MCNC: 119 MG/DL (ref 70–100)
HCT VFR BLD AUTO: 40.9 % (ref 37.5–51)
HGB BLD-MCNC: 14.4 G/DL (ref 13–17.7)
IMM GRANULOCYTES # BLD AUTO: 0.01 10*3/MM3 (ref 0–0.05)
IMM GRANULOCYTES NFR BLD AUTO: 0.2 % (ref 0–0.5)
LYMPHOCYTES # BLD AUTO: 1.52 10*3/MM3 (ref 0.7–3.1)
LYMPHOCYTES NFR BLD AUTO: 27.2 % (ref 19.6–45.3)
MCH RBC QN AUTO: 31.9 PG (ref 26.6–33)
MCHC RBC AUTO-ENTMCNC: 35.2 G/DL (ref 31.5–35.7)
MCV RBC AUTO: 90.5 FL (ref 79–97)
METHADONE UR QL SCN: NEGATIVE
MONOCYTES # BLD AUTO: 0.4 10*3/MM3 (ref 0.1–0.9)
MONOCYTES NFR BLD AUTO: 7.2 % (ref 5–12)
NEUTROPHILS # BLD AUTO: 3.58 10*3/MM3 (ref 1.7–7)
NEUTROPHILS NFR BLD AUTO: 64 % (ref 42.7–76)
NRBC BLD AUTO-RTO: 0 /100 WBC (ref 0–0.2)
OPIATES UR QL: NEGATIVE
PCP UR QL SCN: NEGATIVE
PLATELET # BLD AUTO: 155 10*3/MM3 (ref 140–450)
PMV BLD AUTO: 9.6 FL (ref 6–12)
POTASSIUM BLD-SCNC: 5.1 MMOL/L (ref 3.5–5.3)
PROT SERPL-MCNC: 7.2 G/DL (ref 6.3–8.7)
RBC # BLD AUTO: 4.52 10*6/MM3 (ref 4.14–5.8)
SODIUM BLD-SCNC: 137 MMOL/L (ref 135–145)
WBC NRBC COR # BLD: 5.59 10*3/MM3 (ref 3.4–10.8)

## 2019-08-12 PROCEDURE — 80307 DRUG TEST PRSMV CHEM ANLYZR: CPT | Performed by: EMERGENCY MEDICINE

## 2019-08-12 PROCEDURE — 93005 ELECTROCARDIOGRAM TRACING: CPT | Performed by: EMERGENCY MEDICINE

## 2019-08-12 PROCEDURE — 80053 COMPREHEN METABOLIC PANEL: CPT | Performed by: EMERGENCY MEDICINE

## 2019-08-12 PROCEDURE — 93010 ELECTROCARDIOGRAM REPORT: CPT | Performed by: INTERNAL MEDICINE

## 2019-08-12 PROCEDURE — 99283 EMERGENCY DEPT VISIT LOW MDM: CPT

## 2019-08-12 PROCEDURE — 85025 COMPLETE CBC W/AUTO DIFF WBC: CPT | Performed by: EMERGENCY MEDICINE

## 2019-08-12 RX ADMIN — SODIUM CHLORIDE 1000 ML: 9 INJECTION, SOLUTION INTRAVENOUS at 16:05

## 2019-08-12 NOTE — ED NOTES
"Was asked to speak with patient about rehab. Patient tells me \"I'm not going to any rehab. I just need help with my pain\". Gave patient information on [facility] in [place] and information on [facility]. Patient tells me \"I just need my pain meds back\". I suggested that he try pain management and he tells me that he has an appointment with them on the 18th.   "

## 2019-08-12 NOTE — DISCHARGE INSTRUCTIONS
Follow up with one of the [facility] physician groups below to setup primary care. If you have trouble making an appointment, please call the [facility] at [phone]    Dr. Suzy Nicholson DO, Dr. Priscilla Harry DO, and ROXIE Long  [facility]  [address] [place] [address], [id number] [phone]    Dr. Elpidio North MD  [facility] - [address] [address], [address], [place], [place] [id number] [phone]    Dr. Tj Sotelo DO, Dr. Soto Evans DO,  ROXIE Yang, and ROXIE Lucio  [facility] - [address] [address], [address], [place], [place] [id number] [phone]     Dr. Meme Ramirez MD, and ROXIE Ervin  [address] [id number] [phone]    Dr. Harley Mcadams MD and Dr. Bandar Gill MD  [address], [address] [phone]    Dr. Kostas Desouza MD  [facility]  [address] [place] [address], [address], [place], [place], [address] [phone]    Dr. Manny Sam MD  [facility]  [address] [place], [place], [address] [phone]            Opioid Pain Medicine Information    Opioids are powerful medicines that are used to treat moderate to severe pain. Opioids should be taken with the supervision of a trained health care provider. They should be taken for the shortest period of time as possible. This is because opioids can be addictive and the longer you take opioids, the greater your risk of addiction (opioid use disorder).  What do opioids do?  Opioids help to reduce or eliminate pain. When used for short periods of time, they can help you:  · Sleep better.  · Do better in  physical or occupational therapy.  · Feel better in the first few days after an injury.  · Recover from surgery.  What is a pain treatment plan?  A pain treatment plan is an agreement between you and your health care provider. Pain is unique to each person, and treatments vary depending on your condition. To manage your pain successfully, you and your health care provider need to understand each other and work together. To help you do this:  · Discuss the goals of your treatment, including how much pain you might expect to have and how you will manage the pain.  · Review the risks and benefits of taking opioid medicines for your condition.  · Remember that a good treatment plan uses more than one approach and minimizes the chance of side effects.  · Be honest about the amount of medicines you take, and about any drug or alcohol use.  · Get pain medicine prescriptions from only one care provider.  · Keep all follow-up visits as told by your health care provider. This is important.  What instructions should I follow while taking opioid pain medicine?  While you are taking opioid pain medicines, follow these instructions:  · Do not drive.  · Do not use machinery or power tools.  · Do not sign legal documents.  · Do not drink alcohol.  · Do not take sleeping pills.  · Do not supervise children by yourself.  · Do not participate in activities that require climbing or being in high places.  · Do not enter a body of water--such as a lake, river, ocean, spa, or swimming pool--unless an adult is nearby who can monitor and help you.  What kinds of side effects can opioids cause?  Opioids can cause side effects, such as:  · Constipation.  · Nausea.  · Vomiting.  · Drowsiness.  · Confusion.  · Opioid use disorder.  · Breathing difficulties (respiratory depression).  Using opioid pain medicines for longer than 3 days increases your risk of these side effects.  Taking opioid pain medicine for a long period of time can affect your  ability to do daily tasks. It also puts you at risk for:  · Motor vehicle accidents.  · Depression.  · Suicide.  · Heart attack.  · Overdose, which can sometimes lead to death.  What are alternative ways to manage pain?  Pain can be managed with many types of alternative treatments. Ask your health care provider to refer you to one or more specialists who can help you manage pain through:  · Physical or occupational therapy.  · Counseling (cognitive behavioral therapy).  · Good nutrition.  · Biofeedback.  · Massage.  · Meditation.  · Non-opioid medicine.  · Following a gentle exercise program.  How can I keep others safe while I am taking opioid pain medicine?  · Keep pain medicine in a locked cabinet, or in a secure area where children cannot reach it.  · Never share your pain medicine with anyone.  · Do not save any leftover pills. If you have leftover medicine, you can:  ? Bring the medicine to a prescription take-back program. This is usually offered by the county or law enforcement.  ? Bring it to a pharmacy that has a drug disposal container.  ? Throw it out in the trash. Check the label or package insert of your medicine to see whether this is safe to do. If it is safe to throw it out, remove the medicine from the container and mix it with food or pet waste before putting it in the trash.  How do I stop taking opioids if I have been taking them for a long time?  If you have been taking opioid medicine for more than a few weeks, you may need to slowly decrease (taper) how much you take until you stop completely. Tapering your use of opioids can decrease your chances of experiencing withdrawal symptoms, such as:  · Pain and cramping in the abdomen.  · Nausea.  · Sweating.  · Sleepiness.  · Restlessness.  · Uncontrollable shaking (tremors).  · Cravings for the medicine.  Do not attempt to taper your use of opioids on your own. Talk with your health care provider about how to do this. Your health care provider  may prescribe a step-down schedule based on how much medicine you are taking and how long you have been taking it.  Where to find support  If you have been taking opioids for a long time, you may benefit from receiving support for quitting from a local support group or counselor. Ask your health care provider for a referral to these resources in your area.  Where to find more information  · Centers for Disease Control and Prevention (CDC): www.cdc.gov/drugoverdose/opioids/index.html  Get help right away if:  Seek medical care right away if you are taking opioids and you (or people close to you) notice any of the following:  · Difficulty breathing.  · Breathing that is slower or more shallow than normal.  · A very slow heartbeat (pulse).  · Severe confusion.  · Unconsciousness.  · Sleepiness.  · Slurred speech.  · Nausea and vomiting.  · Cold, clammy skin.  · Blue lips or fingernails.  · Limpness.  · Abnormally small pupils.  If you think that you or someone else may have taken too much of an opioid medicine, get medical help right away. Do not wait to see if the symptoms go away on their own.   If you ever feel like you may hurt yourself or others, or have thoughts about taking your own life, get help right away. You can go to your nearest emergency department or call:  · Your local emergency services (911 in the [place]  · The hotline of the National Poison Control Center (1-620.269.7672 in the [place]  · A suicide crisis helpline, such as the National Suicide Prevention Lifeline at 1-487.658.9724. This is open 24 hours a day.  Summary  · Opioid medicines can help you manage moderate to severe pain for a short period of time.  · Discuss the goals of your treatment with your health care provider, including how much pain you might expect to have and how you will manage the pain.  · A good treatment plan uses more than one approach. Pain can be managed with many types of alternative treatments.  · If you think that you  or someone else may have taken too much of an opioid, get medical help right away.  This information is not intended to replace advice given to you by your health care provider. Make sure you discuss any questions you have with your health care provider.  Document Released: 01/13/2017 Document Revised: 10/08/2018 Document Reviewed: 07/29/2016  [employer] Interactive Patient Education © 2019 [employer]      Chemical Dependency  Chemical dependency is an addiction to drugs or alcohol. People with this addiction repeatedly seek out and use drugs or alcohol despite negative consequences to the health and safety of themselves and others.  Addiction changes the way the brain works. Because of these changes, addiction is a chronic condition. The medical term for addiction or chemical dependency is substance use disorder. The disorder can be mild, moderate, or severe.  People can be dependent on a range of substances. These include alcohol, prescription medicines, and illegal or street drugs, such as marijuana, heroin, and cocaine.  What are the causes?  This condition is caused by the effect that the abused substance has on the brain.  What increases the risk?  The following factors may make you more likely to develop this condition:  · Having a family history of chemical dependency.  · Having mental health issues, such as depression, anxiety, or bipolar disorder.  · Living in an environment where drugs and alcohol are easily available.  · Using drugs or alcohol at a young age.  · Having friends who use drugs or alcohol.  · Having poor social skills.  · Tending to be aggressive or impulsive.  What are the signs or symptoms?  Symptoms may vary depending on the substance that you are addicted to. Symptoms may include the following:  Physical Symptoms  · The inability to limit the use of drugs or alcohol.  · Having nausea, sweating, shakiness, and anxiety when you are not using alcohol or drugs.  · Needing a greater amount  of drugs or alcohol to get the same effect (developing tolerance).  · A change in:  ? Sleeping habits.  ? Eating or appetite.  ? Appearance or how you care for yourself.  Emotional Symptoms  · Angry outbursts.  · Periods of sadness and tearfulness.  · Isolation.  Relationship Problems  · Loved ones suggesting that you have a problem.  · Increased fights.  · Forgetting commitments.  · Having affairs or one-night stands.  Problems Related to Irresponsibility  · Legal problems.  · Irresponsibility with money.  · Missing work.  · Poor decision making.  How is this diagnosed?  This condition may be diagnosed based on your symptoms, your medical history, and a physical exam. You may also have blood tests and urine tests.  How is this treated?  Treatment for this condition depends on the substance that you are addicted to and whether your dependency is mild, moderate, or severe. Treatment options may include:  · Stopping substance use safely. This may require taking medicines and being closely observed for several days.  · Taking part in group and individual counseling with mental health providers who help people with chemical dependency.  · Staying at a residential treatment center for several days or weeks.  · Attending daily counseling sessions at a treatment center.  · Taking medicine as told by your health care provider to:  ? Ease symptoms and prevent complications during withdrawal.  ? Treat other mental health issues, such as depression or anxiety.  ? Block cravings by causing the same effects as the substance.  ? Block the effects of the substance or replace good sensations with unpleasant ones.  · Going to a support group to share your experience with others who are going through the same thing. These groups are an important part of long-term recovery for many people. They include 12-step programs like Alcoholics Anonymous and Narcotics Anonymous.    Recovery can be a long process. Many people who undergo  treatment start using the substance again after stopping. This is called a relapse. If you have a relapse, it does not mean that treatment will not work.  Follow these instructions at home:  · Avoid temptations or triggers that you associate with your use of the substance.  · Learn and practice techniques for managing stress.  · Have a plan for vulnerable moments.  · Get phone numbers of those who are willing to help and who are committed to your recovery.  · Know when and where the meetings that you have chosen will occur.  · Take over-the-counter and prescription medicines only as told by your health care provider.  · Keep all follow-up visits as told by your health care provider. This is important.  Contact a health care provider if:  · You cannot take your medicines as told.  · Your symptoms get worse.  · You have trouble resisting the urge to use drugs or alcohol.  · You are in pain, shaking, sweating, or feeling generally unwell.  · You are losing weight without trying to.  Get help right away if:  · You lose consciousness.  · Your breathing is slow.  · Your pulse is slow or jumpy.  · You have serious thoughts about hurting yourself or someone else.  · You have a relapse.  This information is not intended to replace advice given to you by your health care provider. Make sure you discuss any questions you have with your health care provider.  Document Released: 12/12/2002 Document Revised: 01/23/2017 Document Reviewed: 08/24/2016  [employer] Interactive Patient Education © 2019 [employer]      Opioid Use Disorder  Opioids are powerful substances that relieve pain. Opioids include illegal drugs, such as heroin, as well as prescription pain medicines, such as codeine, morphine, hydrocodone, oxycodone, and fentanyl.  Opioid use disorder is when you take opioids for nonmedical reasons even though taking them hurts your health and well-being. Taking prescribed opioids regularly can lead to dependence, especially if  you take them in larger amounts or more often than they should be taken. Opioid use disorder often disrupts life at home, work, or school. It can cause mental and physical problems. It also increases your risk of suicide and death from overdose.  What are the causes?  This condition is caused by taking opioids. Taking opioids repeatedly results in changes in the brain that make it hard to control opioid use. Many people develop this condition because they like the way they feel when they take opioids or because they get addicted to them.  What increases the risk?  This condition is more likely to develop in:  · People with a family history of opioid use disorder.  · People who misuse other drugs.  · People with a mental illness, such as depression, post-traumatic stress disorder, or antisocial personality disorder.  · People who begin use at an early age, such as during their teen years.  What are the signs or symptoms?  Symptoms of this condition include:  · Taking greater amounts of an opioid than you want to or for longer than you want to.  · Trying several times to control your opioid use.  · Spending a lot of time getting opioids, using them, or recovering from their effects.  · Craving opioids.  · Having problems at work, at school, at home, or in a relationship because of opioid use.  · Giving up or cutting down on important life activities because of opioid use.  · Using opioids when it is dangerous, such as when driving a car.  · Continuing to use an opioid even though it has led to a physical problem, such as:  ? Severe constipation.  ? Poor nutrition.  ? Infertility.  ? Tuberculosis.  ? Aspiration pneumonia.  ? An infection, such as hepatitis or HIV (human immunodeficiency virus).  · Continuing to use an opioid even though it is causing a mental problem, such as:  ? Depression or anxiety.  ? Hallucinations.  ? Sleep problems.  ? Loss of interest in sex.  · Needing more and more of an opioid to get the  same effect that you want from the opioid (building up a tolerance).  · Having symptoms of withdrawal when you stop using an opioid. Some symptoms of withdrawal are:  ? Depression or anxiety.  ? Irritability.  ? Nausea or vomiting.  ? Muscle aches or spasms.  ? Watery eyes.  ? Trouble sleeping.  ? Yawning.  How is this diagnosed?  This condition is diagnosed with an assessment. During the assessment, your health care provider will ask about your opioid use and how it affects your life.  Your health care provider may perform a physical exam or do lab tests to see if you have physical problems resulting from opioid use. Your health care provider may also screen for drug use and refer you to a mental health professional for evaluation.  How is this treated?  Treatment for this condition is usually provided by mental health professionals with training in substance use disorders. The first step in treatment is detoxification, which involves taking medicines to lessen withdrawal symptoms. Additional treatment may involve:  · Counseling. This treatment is also called talk therapy. It is provided by substance use treatment counselors. A counselor can address the reasons you use opioids and suggest ways to keep you from using opioids again. The goals of talk therapy are to:  ? Find healthy activities and ways to cope with stress.  ? Identify and avoid what triggers your opioid use.  ? Help you learn how to handle cravings.  · Support groups. Support groups are run by people who have quit using opioids. They provide emotional support, advice, and guidance.  · A medicine that blocks opioid receptors in your brain. This medicine can reduce opioid cravings that lead to relapse. This medicine also blocks the good feeling that you get from using opioids.  · Opioid maintenance treatment. This involves taking certain kinds of opioid medicines. These medicines satisfy cravings but are safer than commonly misused opioids. This is  often the best option for people who continue to relapse with other treatments.  Follow these instructions at home:  · Take over-the-counter and prescription medicines only as told by your health care provider.  · Check with your health care provider before starting any new medicines.  · Keep all follow-up visits as told by your health care provider. This is important.  Where to find more information  · National [place] on Drug Abuse: www.drugabuse.gov  · Substance Abuse and Mental Health Services Administration: www.samhsa.gov  Contact a health care provider if:  · You are not able to take your medicines as told.  · Your symptoms get worse.  Get help right away if:  · You may have taken too much of an opioid (overdosed).  · You have serious thoughts about hurting yourself or others.  If you ever feel like you may hurt yourself or others, or have thoughts about taking your own life, get help right away. You can go to your nearest emergency department or call:  · Your local emergency services (911 in the [place]  · A suicide crisis helpline, such as the National Suicide Prevention Lifeline at 1-144.207.7620. This is open 24 hours a day.  This information is not intended to replace advice given to you by your health care provider. Make sure you discuss any questions you have with your health care provider.  Document Released: 10/14/2008 Document Revised: 09/29/2017 Document Reviewed: 09/29/2017  [employer] Interactive Patient Education © 2019 [employer]      Finding Treatment for Addiction  Addiction is a complex disease of the brain that causes an uncontrollable (compulsive) need for:  · A substance. This includes alcohol, illegal drugs, or prescription medicines, such as painkillers.  · An activity or behavior, such as gambling or shopping.  Addiction changes the way your brain works. Because of this change:  · The need for the medicine, drug, or activity can become so strong that you think about it all the  time.  · Getting more and more of your addiction becomes the most important thing to you.  · You may find yourself leaving other activities and relationships to pursue your addiction.  · You can become physically dependent on a substance.  · Your health, behavior, emotions, and relationships can change for the worse.  How do I know if I need treatment for addiction?  Addiction is a progressive disease. Without treatment, addiction can get worse. Living with addiction puts you at higher risk for injury, poor health, loss of employment, loss of money, and even death.  You might need treatment for addiction if:  · You have tried to stop or cut down, but you have not succeeded.  · You find it annoying that your friends and family are concerned about your use or behavior.  · You feel guilty about your use or behavior.  · You need a particular substance or activity to start your day or to calm down.  · You are running out of money because of your addiction.  · You have done something illegal to support your addiction.  · Your addiction has caused you:  ? Health problems.  ? Trouble in school, work, home, or with the police.  ? To devote all your time to your addiction, and not to other responsibilities.  ? To tell lies in order to hide your problem.  What types of treatment are available?  There may be options for treatment programs and plans based on your addiction, condition, needs, and preferences. No single treatment is right for everyone.  · Treatment programs can be:  ? Outpatient. You live at home and go to work or school, but you go to a clinic for treatment.  ? Inpatient. You live and sleep at the program facility during treatment.  · Programs may include:  ? Medicine. You may need medicine to treat the addiction itself, or to treat anxiety or depression.  ? Counseling and behavior therapy. This can help individuals and families behave in healthier ways and relate more effectively.  ? Support groups. Confidential  "group therapy, such as a 12-step program, can help individuals and families during treatment and recovery.  ? A combination of education, counseling, and a 12-step, spirituality-based approach.  What should I consider when selecting a treatment program?  Think about your individual requirements when selecting a treatment program. Ask about:  · The overall approach to treatment.  ? Some programs are strictly 12-step programs. Some have a more flexible approach.  ? Programs may differ in length of stay, setting, and size.  ? Some programs include your family in your treatment plan. Support may be offered to them throughout the treatment process, as well as instructions for them when you are discharged.  ? You may continue to receive support after you have left the program.  · The types of medical services that are offered. Find out if the program:  ? Offers specific treatment for your particular addiction.  ? Meets all of your needs, including physical and cultural needs.  ? Includes any medicines you might need.  ? Offers mental health counseling as part of your treatment.  ? Offers the 12-step meetings at the center, or if transport is available for patients to attend meetings at other locations.  · The cost and types of insurance that are accepted.  ? Some programs are sponsored by the government. They support patients who do not have private insurance.  ? If you do not have insurance, or if you choose to attend a program that does not accept your insurance, call the treatment center. Tell them your financial needs and whether a payment plan can be set up.  ? There are also organizations that will help you find the resources for treatment. You can find them online by searching \"treatment for addiction.\"  · If the program is certified by the appropriate government agency.  Where to find support  · Your health care provider can help you to find the right treatment. These discussions are confidential.  · The National " [place] on Alcoholism and Drug Dependence (NCADD). This group has information about treatment centers and programs for people who have an addiction and for family members.  ? Call: 2-419-YRN-CALL (1-220.967.3453).  ? Visit the website: https://www.ncadd.org/  · The Substance Abuse and Mental Health Services Administration (SAMA). This organization will help you find publicly funded treatment centers, help hotlines, and counseling services near you.  ? Call: 3-809-516-HELP (1-678.460.2796).  ? Visit the website: www.findtreatment.sama.gov  · The National Problem Gambling Helpline. This is a 24-hour confidential helpline for gambling addiction.  ? Call: 1-879.531.6145  ? Visit the website: https://www.[place] Semiambling.org/  In countries outside of the [place] and [place], look in local directories for contact information for services in your area.  Follow these instructions at home:  · Find supportive people who will help you stay away from your addiction and stay sober.  · Do not use the substance or engage in the activity.  · If you have been through treatment:  ? Follow your plan. The plan is usually developed by you and your health care provider during treatment.  ? Go to meetings with other people in recovery.  ? Avoid people, situations, and things that lead you to do the things you are addicted to (triggers).  Summary  · Addiction changes the way your brain works. These changes cause a desire to repeat and increase the use of the a substance or behavior.  · Addiction is a progressive disease. Without treatment, addiction can get worse. Living with addiction puts you at higher risk for injury, poor health, loss of employment, loss of money, and even death.  · There may be options for treatment programs and plans based on your addiction, condition, needs, and preferences. No single treatment is right for everyone.  · Your health care provider can help you to find the right treatment. These discussions are  confidential.  This information is not intended to replace advice given to you by your health care provider. Make sure you discuss any questions you have with your health care provider.  Document Released: 11/16/2006 Document Revised: 01/16/2019 Document Reviewed: 01/16/2019  Elsevier Interactive Patient Education © 2019 [employer]

## 2019-09-11 ENCOUNTER — TELEPHONE (OUTPATIENT)
Dept: NEUROLOGY | Facility: CLINIC | Age: 69
End: 2019-09-11

## 2019-09-11 NOTE — TELEPHONE ENCOUNTER
Vikas said he was supposed to call in 1 month with a progress report.  His legs are better, they hurt some.  Once or twice a day they really hurt for a while, then they quit hurting.

## 2019-09-12 ENCOUNTER — APPOINTMENT (OUTPATIENT)
Dept: MRI IMAGING | Facility: HOSPITAL | Age: 69
End: 2019-09-12

## 2019-09-16 DIAGNOSIS — G44.89 OTHER HEADACHE SYNDROME: ICD-10-CM

## 2019-09-16 DIAGNOSIS — S12.110D CLOSED ODONTOID FRACTURE WITH TYPE II MORPHOLOGY, ANTERIOR DISPLACEMENT, AND ROUTINE HEALING, SUBSEQUENT ENCOUNTER: ICD-10-CM

## 2019-09-16 DIAGNOSIS — M48.062 SPINAL STENOSIS OF LUMBAR REGION WITH NEUROGENIC CLAUDICATION: ICD-10-CM

## 2019-09-16 RX ORDER — TRAZODONE HYDROCHLORIDE 50 MG/1
TABLET ORAL
Qty: 60 TABLET | Refills: 5 | Status: SHIPPED | OUTPATIENT
Start: 2019-09-16 | End: 2020-02-13 | Stop reason: SDUPTHER

## 2019-09-30 DIAGNOSIS — C61 PROSTATE CANCER (HCC): Primary | ICD-10-CM

## 2019-10-08 ENCOUNTER — HOSPITAL ENCOUNTER (OUTPATIENT)
Dept: MRI IMAGING | Facility: HOSPITAL | Age: 69
Discharge: HOME | End: 2019-10-08
Admitting: UROLOGY

## 2019-10-08 DIAGNOSIS — C61 PROSTATE CANCER (HCC): ICD-10-CM

## 2019-10-08 LAB — CREAT BLDA-MCNC: 1.2 MG/DL (ref 0.6–1.3)

## 2019-10-08 PROCEDURE — A9577 INJ MULTIHANCE: HCPCS | Performed by: UROLOGY

## 2019-10-08 PROCEDURE — 82565 ASSAY OF CREATININE: CPT

## 2019-10-08 PROCEDURE — 72197 MRI PELVIS W/O & W/DYE: CPT

## 2019-10-08 PROCEDURE — 0 GADOBENATE DIMEGLUMINE 529 MG/ML SOLUTION: Performed by: UROLOGY

## 2019-10-08 RX ADMIN — GADOBENATE DIMEGLUMINE 20 ML: 529 INJECTION, SOLUTION INTRAVENOUS at 13:51

## 2019-10-10 NOTE — PROGRESS NOTES
Subjective    Mr. Cruz is 69 y.o. male    Chief Complaint: Prostate cancer    History of Present Illness  69-year-old male established patient follow-up for prostate cancer diagnosed with clinical T1c [place] 6 prostate cancer, low-volume 3 of 12 cores 1 to 10% of each core involvement, left mid, and left apex.  Biopsy done for PSA of 4.99 on 5/10/2018.  Last PSA on file May 2019 was 6.56.  Dr. Rivera ordered him an MRI prostate prior to him leaving which showed a 9 mm PI-RADS 5 lesion.  Patient is bothered by worsening LUTS, mostly nocturia and frequency urgency.  Quality painless.  Severity worsening.  Timing constant.  Onset gradual.    I spent time today reviewing and summarizing old records last urology clinic visit with Dr. Rivera 6/18/2019.    I independently visualized and reviewed the patient's prior imaging studies today in clinic and discussed the imaging findings with the patient.        He was initially diagnosed with prostate cancer about  1 year(s) ago. This was identified in the context of elevated psa and prostate nodule.  Severity of the disease is best described as probable organ confined disease. Previous or current management includes active surveillance  Without having undergone initial surveillance biopsy. Associated symptoms include weak urinary stream. Currently his PSA is 6.5.      The following portions of the patient's history were reviewed and updated as appropriate: allergies, current medications, past family history, past medical history, past social history, past surgical history and problem list.    Review of Systems   Constitutional: Negative for chills and fever.   Gastrointestinal: Negative for abdominal pain, anal bleeding and blood in stool.   Genitourinary: Negative for dysuria, frequency, hematuria and urgency.   All other systems reviewed and are negative.        Current Outpatient Medications:   •  amLODIPine-benazepril (LOTREL 2.5-10) 2.5-10 MG per capsule, Take 1 capsule by  "mouth Daily., Disp: , Rfl:   •  aspirin 81 MG tablet, Take 1 tablet by mouth Daily., Disp: , Rfl:   •  atorvastatin (LIPITOR) 10 MG tablet, Take 10 mg by mouth Daily., Disp: , Rfl:   •  mexiletine (MEXITIL) 150 MG capsule, Take 2 capsules by mouth 2 (Two) Times a Day., Disp: 120 capsule, Rfl: 5  •  traZODone (DESYREL) 50 MG tablet, TAKE TWO TABLETS BY MOUTH AT BEDTIME, Disp: 60 tablet, Rfl: 5    Past Medical History:   Diagnosis Date   • Anxiety    • Chronic kidney disease    • Chronic pain syndrome    • GI bleeding    • History of transfusion    • Hyperlipidemia    • Hypertension    • Injury of back    • Insomnia    • Neck injury    • PAF (paroxysmal atrial fibrillation) (CMS/HCC)    • Therapeutic opioid induced constipation        Past Surgical History:   Procedure Laterality Date   • BACK SURGERY     • CERVICAL LAMINECTOMY DECOMPRESSION POSTERIOR N/A 2/27/2018    Procedure: CERVICAL  POSTERIOR INSTRUMENTED FUSION C1-4;  Surgeon: Bandar Donis MD;  Location: [facility];  Service:    • ODONTOID FRACTURE SURGERY         Social History     Socioeconomic History   • Marital status:      Spouse name: Not on file   • Number of children: Not on file   • Years of education: Not on file   • Highest education level: Not on file   Tobacco Use   • Smoking status: Never Smoker   • Smokeless tobacco: Never Used   Substance and Sexual Activity   • Alcohol use: No   • Drug use: No   • Sexual activity: Defer       Family History   Problem Relation Age of Onset   • Cancer Mother    • No Known Problems Father        Objective    Temp 97 °F (36.1 °C)   Ht 177.8 cm (70\")   Wt 66.9 kg (147 lb 6.4 oz)   BMI 21.15 kg/m²     Physical Exam  Constitutional: Well nourished, Well developed; No apparent distress; Vital reviewed as above  Psychiatric: Appropriate affect; Alert and oriented  Eyes: Unremarkable  Musculoskeletal: Normal gait and station  GI: Abdomen is soft, non-tender  Respiratory: No distress; Unlabored movement; No " accessory musculature needed with symmetric movements  Skin: No pallor or diaphoresis  Lymphatic: No adenopathy neck or groin      Results for orders placed or performed during the hospital encounter of 10/08/19   POC Creatinine   Result Value Ref Range    Creatinine 1.20 0.60 - 1.30 mg/dL     Patient's Body mass index is 21.15 kg/m². BMI is within normal parameters. No follow-up required..    Assessment and Plan    Diagnoses and all orders for this visit:    Prostate cancer (CMS/[facility])  -     Cancel: POC Urinalysis Dipstick, Multipro  -     levoFLOXacin (LEVAQUIN) 500 MG tablet; 1 tab by mouth the evening prior to biopsy  -     sodium phosphate (FLEET) 7-19 GM/118ML enema; Use rectally the evening prior to prostate biopsy  -     Biopsy Prostate    Urine frequency  -     tamsulosin (FLOMAX) 0.4 MG capsule 24 hr capsule; Take 1 capsule by mouth Every Night.      Worsening LUTS on active surveillance for low risk prostate cancer.  Recommend starting tamsulosin.  He is due for his surveillance prostate biopsy which will be scheduled in the next few weeks.  He was instructed to hold his aspirin 7 days prior to take an antibiotic the evening prior.  We discussed risk for infection, bleeding, need for additional procedures, inability to diagnose and/or cure, need for continued follow-up.  Patient voiced understanding and provided informed consent to proceed.

## 2019-10-17 ENCOUNTER — OFFICE VISIT (OUTPATIENT)
Dept: UROLOGY | Facility: CLINIC | Age: 69
End: 2019-10-17

## 2019-10-17 VITALS — BODY MASS INDEX: 21.1 KG/M2 | WEIGHT: 147.4 LBS | TEMPERATURE: 97 F | HEIGHT: 70 IN

## 2019-10-17 DIAGNOSIS — C61 PROSTATE CANCER (HCC): Primary | ICD-10-CM

## 2019-10-17 DIAGNOSIS — R35.0 URINE FREQUENCY: ICD-10-CM

## 2019-10-17 PROCEDURE — 99214 OFFICE O/P EST MOD 30 MIN: CPT | Performed by: UROLOGY

## 2019-10-17 RX ORDER — MAGNESIUM HYDROXIDE 1200 MG/15ML
LIQUID ORAL
Qty: 1 ENEMA | Refills: 0 | Status: SHIPPED | OUTPATIENT
Start: 2019-10-17 | End: 2020-02-13

## 2019-10-17 RX ORDER — TAMSULOSIN HYDROCHLORIDE 0.4 MG/1
1 CAPSULE ORAL NIGHTLY
Qty: 90 CAPSULE | Refills: 3 | Status: SHIPPED | OUTPATIENT
Start: 2019-10-17 | End: 2020-02-03

## 2019-10-17 RX ORDER — LEVOFLOXACIN 500 MG/1
TABLET, FILM COATED ORAL
Qty: 1 TABLET | Refills: 0 | Status: SHIPPED | OUTPATIENT
Start: 2019-10-17 | End: 2020-02-13

## 2019-10-24 ENCOUNTER — PROCEDURE VISIT (OUTPATIENT)
Dept: UROLOGY | Facility: CLINIC | Age: 69
End: 2019-10-24

## 2019-10-24 DIAGNOSIS — C61 PROSTATE CANCER (HCC): Primary | ICD-10-CM

## 2019-10-24 PROCEDURE — 55700 PR PROSTATE NEEDLE BIOPSY ANY APPROACH: CPT | Performed by: UROLOGY

## 2019-10-24 PROCEDURE — G0416 PROSTATE BIOPSY, ANY MTHD: HCPCS | Performed by: UROLOGY

## 2019-10-24 PROCEDURE — 88342 IMHCHEM/IMCYTCHM 1ST ANTB: CPT | Performed by: UROLOGY

## 2019-10-24 PROCEDURE — 76942 ECHO GUIDE FOR BIOPSY: CPT | Performed by: UROLOGY

## 2019-10-24 NOTE — PROGRESS NOTES
CC: I am here for my prostate biopsy    TRUS PROSTATE WITH BIOPSY PROCEDURE NOTE  Indications:  Prostate cancer    Pre-operative prep:  fleets enema and oral antibiotic      Procedure:    After proper identification of patient and procedure, patient was placed in the left later decubitus position.  2% lidocaine jelly was instilled per rectum  for topical anesthesia.  The ultrasound probe was gently inserted per rectum. Prostate was scanned from the base of the bladder to the apex.  20 cc of 1% lidocaine plain was then used to perform a prostate nerve block injecting the junction of the seminal vesicle and bladder laterally.      Prostate length: 32.3 mm    Prostate width: 40.0 mm    Prostate height: 20 mm    Prostate volume: 13.5 cc    PSA density: 0.49    Abnormal findings:  No lesions noted    Median lobe:  no    A total of 12 biopsies were taken from the base, apex, and mid portion of the gland on both the right and the left sides.     Patient tolerated the procedure well    Complications: none    Estimated blood loss: minimal    Mr. Cruz was given instructions for follow up.  He will notify the office if he has excessive hematuria, hematochezia, fevers, perineal, or abdominal pain.    Follow up:   He will follow up in 1 week  for pathology results    Diagnoses and all orders for this visit:    Prostate cancer (CMS/[facility])  -     Cancel: POC Urinalysis Dipstick, Multipro  -     Tissue Pathology Exam; Future  -     Tissue Pathology Exam        Assessment/Plan

## 2019-10-28 LAB
CYTO UR: NORMAL
LAB AP CASE REPORT: NORMAL
PATH REPORT.FINAL DX SPEC: NORMAL
PATH REPORT.GROSS SPEC: NORMAL

## 2019-10-31 ENCOUNTER — OFFICE VISIT (OUTPATIENT)
Dept: UROLOGY | Facility: CLINIC | Age: 69
End: 2019-10-31

## 2019-10-31 VITALS — TEMPERATURE: 97.6 F | BODY MASS INDEX: 21.05 KG/M2 | WEIGHT: 147 LBS | HEIGHT: 70 IN

## 2019-10-31 DIAGNOSIS — C61 MALIGNANT NEOPLASM OF PROSTATE (HCC): Primary | ICD-10-CM

## 2019-10-31 PROCEDURE — 99213 OFFICE O/P EST LOW 20 MIN: CPT | Performed by: UROLOGY

## 2019-10-31 NOTE — PROGRESS NOTES
"Subjective    Mr. Cruz is 69 y.o. male    Chief Complaint: Prostate cancer    History of Present Illness  69-year-old male established patient follow-up to review prostate biopsy results from last week done for history of prostate cancer diagnosed with clinical T1c [place] 6 prostate cancer, low-volume 3 of 12 cores 1 to 10% of each core involvement, left mid, and left apex by Dr. Rivera last year..    Original biopsy done for PSA of 4.99 on 5/10/2018.  Last PSA on file May 2019 was 6.56.  Dr. Rivera ordered him an MRI prostate prior to him leaving which showed a 9 mm PI-RADS 5 lesion.    Started on tamsulosin last visit for bothersome lots which he says \"did not help at all\".  Biopsy done 10/24/2019 showed 20% of 1 of 2 cores left mid with [place] 6 prostate cancer.  TRUS volume of prostate 13 cc.    The following portions of the patient's history were reviewed and updated as appropriate: allergies, current medications, past family history, past medical history, past social history, past surgical history and problem list.    Review of Systems      Current Outpatient Medications:   •  amLODIPine-benazepril (LOTREL 2.5-10) 2.5-10 MG per capsule, Take 1 capsule by mouth Daily., Disp: , Rfl:   •  aspirin 81 MG tablet, Take 1 tablet by mouth Daily., Disp: , Rfl:   •  atorvastatin (LIPITOR) 10 MG tablet, Take 10 mg by mouth Daily., Disp: , Rfl:   •  levoFLOXacin (LEVAQUIN) 500 MG tablet, 1 tab by mouth the evening prior to biopsy, Disp: 1 tablet, Rfl: 0  •  mexiletine (MEXITIL) 150 MG capsule, Take 2 capsules by mouth 2 (Two) Times a Day., Disp: 120 capsule, Rfl: 5  •  sodium phosphate (FLEET) 7-19 GM/118ML enema, Use rectally the evening prior to prostate biopsy, Disp: 1 enema, Rfl: 0  •  tamsulosin (FLOMAX) 0.4 MG capsule 24 hr capsule, Take 1 capsule by mouth Every Night., Disp: 90 capsule, Rfl: 3  •  traZODone (DESYREL) 50 MG tablet, TAKE TWO TABLETS BY MOUTH AT BEDTIME, Disp: 60 tablet, Rfl: 5    Past Medical " "History:   Diagnosis Date   • Anxiety    • Chronic kidney disease    • Chronic pain syndrome    • GI bleeding    • History of transfusion    • Hyperlipidemia    • Hypertension    • Injury of back    • Insomnia    • Neck injury    • PAF (paroxysmal atrial fibrillation) (CMS/HCC)    • Therapeutic opioid induced constipation        Past Surgical History:   Procedure Laterality Date   • BACK SURGERY     • CERVICAL LAMINECTOMY DECOMPRESSION POSTERIOR N/A 2/27/2018    Procedure: CERVICAL  POSTERIOR INSTRUMENTED FUSION C1-4;  Surgeon: Bandar oDnis MD;  Location: [facility];  Service:    • ODONTOID FRACTURE SURGERY         Social History     Socioeconomic History   • Marital status:      Spouse name: Not on file   • Number of children: Not on file   • Years of education: Not on file   • Highest education level: Not on file   Tobacco Use   • Smoking status: Never Smoker   • Smokeless tobacco: Never Used   Substance and Sexual Activity   • Alcohol use: No   • Drug use: No   • Sexual activity: Defer       Family History   Problem Relation Age of Onset   • Cancer Mother    • No Known Problems Father        Objective    Temp 97.6 °F (36.4 °C)   Ht 177.8 cm (70\")   Wt 66.7 kg (147 lb)   BMI 21.09 kg/m²     Physical Exam  Constitutional: Well nourished, Well developed; No apparent distress; Vital reviewed as above  Psychiatric: Appropriate affect; Alert and oriented  Eyes: Unremarkable  Musculoskeletal: Normal gait and station  GI: Abdomen is soft, non-tender  Respiratory: No distress; Unlabored movement; No accessory musculature needed with symmetric movements  Skin: No pallor or diaphoresis  Lymphatic: No adenopathy neck or groin      Results for orders placed or performed in visit on 10/24/19   Tissue Pathology Exam   Result Value Ref Range    Case Report       Surgical Pathology Report                         Case: [id number]                                  Authorizing Provider:  Miquel Seay MD      " "Collected:           10/24/2019 12:55 PM          Ordering Location:     [facility]     Received:            10/25/2019 08:38 AM                                 [facility] UROLOGY                                                                Pathologist:           Zaynab Sotelo MD                                                        Specimens:   1) - Prostate, Needle Biopsy Left Base                                                              2) - Prostate, Needle Biopsy Left Mid                                                               3) - Prostate, Needle Biopsy Left [place]                                                              4) - Prostate, Needle Biopsy Right Base                                                             5) - Prostate, Needle Biopsy Right Mid                                                               6) - Prostate, Needle Biopsy Right [place]                                                    Final Diagnosis       1.  Prostate, left base, needle biopsies: Benign prostate tissue demonstrating focal chronic inflammation and glandular atrophy.    2.  Prostate, left mid, needle biopsies: Adenocarcinoma of the prostate, [place] grade 3+3 = 6, involving approximately 20% of the total volume of the biopsies (Grade Group 1).    3.  Prostate, left apex, needle biopsies: Benign prostate tissue.    4.  Prostate, right base, needle biopsies: Benign prostate tissue.    5.  Prostate, right mid, needle biopsies: Benign prostate tissue demonstrating chronic inflammation and glandular atrophy.    6.  Prostate, right apex, needle biopsies:  A.  Benign prostate tissue.  B.  Benign fibromuscular stroma with surface squamous mucosa compatible with anal tissue.    AJCC stage: T1c pNX      Gross Description          Specimen # 1 is received in formalin labeled with the patient's name, date of birth, and \"left base prostate needle biopsy”.  The specimen consists of two Telfa pads with 2 " pink/gray soft tissue core biopsies measuring 0.6 and 0.8 cm in length by less than 0.1 cm in diameter.  The specimen is inked with hematoxylin and totally submitted in (block 1A).    Specimen # 2 is received in formalin labeled with the patient's name, date of birth, and “left mid prostate needle biopsy”.  The specimen consists of two Telfa pads with 2 pink/gray soft tissue core biopsies measuring 0.7 and 1.0 cm in length by less than 0.1 cm in diameter.  The specimen is inked with hematoxylin and totally submitted in (block 2A).    Specimen # 3 is received in formalin labeled with the patient's name, date of birth, and “left apex prostate needle biopsy”.  The specimen consists of two Telfa pads with 2 pink/gray soft tissue core biopsies measuring 0.5 and 0.8 cm in length by less than 0.1 cm in diameter.  The specimen is inked with hematoxylin and totally submitted in (block 3A).    Specimen # 4 is received in formalin labeled with the patient's name, date of birth, and “right base prostate needle biopsy”.  The specimen consists of two Telfa pads with 2 pink/gray soft tissue core biopsies measuring 0.8 and 1.0 cm in length by less than 0.1 cm in diameter.  The specimen is inked with hematoxylin and totally submitted in (block 4A).    Specimen # 5 is received in formalin labeled with the patient's name, date of birth, and “right mid prostate needle biopsy”.  The specimen consists of two Telfa pads with 2 pink/gray soft tissue core biopsies measuring 0.9 and 1.0 cm in length by less than 0.1 cm in diameter.  The specimen is inked with hematoxylin and totally submitted in (block 5A).    Specimen # 6 is received in formalin labeled with the patient's name, date of birth, and “right apex prostate needle biopsy”.  The specimen consists of two Telfa pads with 2 pink/gray soft tissue core biopsies measuring 0.3 and 1.1 cm in length by less than 0.1 cm in diameter.  The specimen is inked with hematoxylin and totally  submitted in (block 6A).                       Microscopic Description       1.  Step sections of the left base needle biopsies reveal benign prostate tissue.  There is focal chronic inflammation and glandular atrophy.    2.  Step sections of the left mid-needle biopsies reveal adenocarcinoma of the prostate, [place] grade 3+3 = 6, in one out of 2 biopsies.  The tumor has a linear length of 1.8 mm and involves approximately 20% of the total volume of the biopsies.  Absence of a basal cell lining in the area of interest is confirmed utilizing immunohistochemical stain for .  The control stains appropriately.    3.  Step sections of the left apex needle biopsies reveal benign prostate tissue.  One fragment of tissue demonstrates surface squamous epithelium.    4.  Step sections of the right base needle biopsies reveal benign prostate tissue.    5.  Step sections of the right mid needle biopsy reveal benign prostate tissue demonstrating chronic inflammation and glandular atrophy.    6.  Step sections of the right apex needle biopsies reveal benign prostate tissue.  In one needle core, prostate glands are focally identified.  The remainder of the tissue consists of fibromuscular stroma with surface squamous epithelium compatible with anal origin.        Patient's Body mass index is 21.09 kg/m². BMI is within normal parameters. No follow-up required..    Assessment and Plan    Diagnoses and all orders for this visit:    Malignant neoplasm of prostate (CMS/HCC)  -     PSA Diagnostic; Future        Low-volume low risk [place] 6 prostate cancer.  Patient would like to remain on active surveillance.  Follow-up with me in 3 to 4 months with pre-clinic PSA.    More than half of this 15 minute visit was spent on counseling/coordinating care for  the patient about the following:   -Prostate cancer         The entire time allotted was spent as face to face time with the patient. .

## 2020-01-27 ENCOUNTER — RESULTS ENCOUNTER (OUTPATIENT)
Dept: UROLOGY | Facility: CLINIC | Age: 70
End: 2020-01-27

## 2020-01-27 DIAGNOSIS — C61 MALIGNANT NEOPLASM OF PROSTATE (HCC): ICD-10-CM

## 2020-01-27 DIAGNOSIS — M79.2 NEURALGIA: ICD-10-CM

## 2020-01-27 DIAGNOSIS — M48.062 SPINAL STENOSIS OF LUMBAR REGION WITH NEUROGENIC CLAUDICATION: ICD-10-CM

## 2020-01-27 RX ORDER — MEXILETINE HYDROCHLORIDE 150 MG/1
CAPSULE ORAL
Qty: 120 CAPSULE | Refills: 5 | Status: SHIPPED | OUTPATIENT
Start: 2020-01-27 | End: 2020-08-03 | Stop reason: SDUPTHER

## 2020-02-01 LAB — PSA SERPL-MCNC: 5.19 NG/ML (ref 0–4)

## 2020-02-03 ENCOUNTER — OFFICE VISIT (OUTPATIENT)
Dept: UROLOGY | Facility: CLINIC | Age: 70
End: 2020-02-03

## 2020-02-03 VITALS — TEMPERATURE: 98.2 F | BODY MASS INDEX: 21.47 KG/M2 | HEIGHT: 70 IN | WEIGHT: 150 LBS

## 2020-02-03 DIAGNOSIS — N18.30 CHRONIC KIDNEY DISEASE, STAGE III (MODERATE) (HCC): ICD-10-CM

## 2020-02-03 DIAGNOSIS — C61 MALIGNANT NEOPLASM OF PROSTATE (HCC): Primary | ICD-10-CM

## 2020-02-03 DIAGNOSIS — I48.0 PAF (PAROXYSMAL ATRIAL FIBRILLATION) (HCC): ICD-10-CM

## 2020-02-03 DIAGNOSIS — I67.9 CEREBROVASCULAR SMALL VESSEL DISEASE: ICD-10-CM

## 2020-02-03 PROCEDURE — 99213 OFFICE O/P EST LOW 20 MIN: CPT | Performed by: UROLOGY

## 2020-02-13 ENCOUNTER — OFFICE VISIT (OUTPATIENT)
Dept: NEUROLOGY | Facility: CLINIC | Age: 70
End: 2020-02-13

## 2020-02-13 VITALS
SYSTOLIC BLOOD PRESSURE: 130 MMHG | HEIGHT: 70 IN | HEART RATE: 78 BPM | WEIGHT: 150 LBS | BODY MASS INDEX: 21.47 KG/M2 | DIASTOLIC BLOOD PRESSURE: 80 MMHG

## 2020-02-13 DIAGNOSIS — M79.2 NEURALGIA: ICD-10-CM

## 2020-02-13 DIAGNOSIS — G44.89 OTHER HEADACHE SYNDROME: ICD-10-CM

## 2020-02-13 DIAGNOSIS — M48.062 SPINAL STENOSIS OF LUMBAR REGION WITH NEUROGENIC CLAUDICATION: ICD-10-CM

## 2020-02-13 DIAGNOSIS — I67.9 CEREBROVASCULAR SMALL VESSEL DISEASE: Primary | ICD-10-CM

## 2020-02-13 DIAGNOSIS — S12.110D CLOSED ODONTOID FRACTURE WITH TYPE II MORPHOLOGY, ANTERIOR DISPLACEMENT, AND ROUTINE HEALING, SUBSEQUENT ENCOUNTER: ICD-10-CM

## 2020-02-13 PROCEDURE — 99213 OFFICE O/P EST LOW 20 MIN: CPT | Performed by: PHYSICIAN ASSISTANT

## 2020-02-13 RX ORDER — TRAZODONE HYDROCHLORIDE 50 MG/1
100 TABLET ORAL
Qty: 60 TABLET | Refills: 5 | Status: SHIPPED | OUTPATIENT
Start: 2020-02-13 | End: 2020-11-24

## 2020-02-13 RX ORDER — OXYCODONE AND ACETAMINOPHEN 10; 325 MG/1; MG/1
1 TABLET ORAL EVERY 6 HOURS PRN
COMMUNITY

## 2020-02-25 ENCOUNTER — TELEPHONE (OUTPATIENT)
Dept: NEUROLOGY | Facility: CLINIC | Age: 70
End: 2020-02-25

## 2020-02-25 NOTE — TELEPHONE ENCOUNTER
ASHOK CALLED STATING THAT WHEN HE SAW DR MORTON HE GAVE HIM SOME PAPERWORK TO SEND A MEDICAL REPORT TO THE [facility] ON BEHALF OF HIS  LICENSE. PLEASE CALL PATIENT BACK AT:    PATIENT #: [phone]

## 2020-07-23 ENCOUNTER — TELEPHONE (OUTPATIENT)
Dept: UROLOGY | Facility: CLINIC | Age: 70
End: 2020-07-23

## 2020-07-23 NOTE — TELEPHONE ENCOUNTER
Left vm for next follow up with Dr. Seay appointment had to be rescheduled. Appointment reminder was sent to patient.

## 2020-08-01 ENCOUNTER — RESULTS ENCOUNTER (OUTPATIENT)
Dept: UROLOGY | Facility: CLINIC | Age: 70
End: 2020-08-01

## 2020-08-01 DIAGNOSIS — C61 MALIGNANT NEOPLASM OF PROSTATE (HCC): ICD-10-CM

## 2020-08-03 DIAGNOSIS — M48.062 SPINAL STENOSIS OF LUMBAR REGION WITH NEUROGENIC CLAUDICATION: ICD-10-CM

## 2020-08-03 DIAGNOSIS — M79.2 NEURALGIA: ICD-10-CM

## 2020-08-03 RX ORDER — MEXILETINE HYDROCHLORIDE 150 MG/1
300 CAPSULE ORAL 2 TIMES DAILY
Qty: 120 CAPSULE | Refills: 5 | Status: SHIPPED | OUTPATIENT
Start: 2020-08-03 | End: 2021-01-25

## 2020-08-05 LAB — PSA SERPL-MCNC: 7.99 NG/ML (ref 0–4)

## 2020-08-11 ENCOUNTER — OFFICE VISIT (OUTPATIENT)
Dept: NEUROLOGY | Facility: CLINIC | Age: 70
End: 2020-08-11

## 2020-08-11 ENCOUNTER — OFFICE VISIT (OUTPATIENT)
Dept: UROLOGY | Facility: CLINIC | Age: 70
End: 2020-08-11

## 2020-08-11 VITALS — TEMPERATURE: 97.1 F | HEIGHT: 70 IN | BODY MASS INDEX: 20.64 KG/M2 | WEIGHT: 144.2 LBS

## 2020-08-11 VITALS
WEIGHT: 144 LBS | DIASTOLIC BLOOD PRESSURE: 80 MMHG | HEART RATE: 82 BPM | SYSTOLIC BLOOD PRESSURE: 140 MMHG | BODY MASS INDEX: 20.62 KG/M2 | HEIGHT: 70 IN | OXYGEN SATURATION: 98 %

## 2020-08-11 DIAGNOSIS — I67.9 CEREBROVASCULAR SMALL VESSEL DISEASE: Primary | ICD-10-CM

## 2020-08-11 DIAGNOSIS — I47.1 PSVT (PAROXYSMAL SUPRAVENTRICULAR TACHYCARDIA) (HCC): ICD-10-CM

## 2020-08-11 DIAGNOSIS — C61 MALIGNANT NEOPLASM OF PROSTATE (HCC): Primary | ICD-10-CM

## 2020-08-11 DIAGNOSIS — I48.0 PAF (PAROXYSMAL ATRIAL FIBRILLATION) (HCC): ICD-10-CM

## 2020-08-11 DIAGNOSIS — M48.062 SPINAL STENOSIS OF LUMBAR REGION WITH NEUROGENIC CLAUDICATION: ICD-10-CM

## 2020-08-11 DIAGNOSIS — G44.89 OTHER HEADACHE SYNDROME: ICD-10-CM

## 2020-08-11 DIAGNOSIS — M79.2 NEURALGIA: ICD-10-CM

## 2020-08-11 PROCEDURE — 99213 OFFICE O/P EST LOW 20 MIN: CPT | Performed by: UROLOGY

## 2020-08-11 PROCEDURE — 99214 OFFICE O/P EST MOD 30 MIN: CPT | Performed by: PHYSICIAN ASSISTANT

## 2020-08-19 ENCOUNTER — TRANSCRIBE ORDERS (OUTPATIENT)
Dept: ADMINISTRATIVE | Facility: HOSPITAL | Age: 70
End: 2020-08-19

## 2020-08-19 DIAGNOSIS — M51.16 LUMBAR DISC DISEASE WITH RADICULOPATHY: Primary | ICD-10-CM

## 2020-08-23 NOTE — PROGRESS NOTES
Subjective   Vikas Cruz is a 69 y.o. male is here today for follow-up.    This patient has developed headaches, having onset after upper posterior cervical fusion for odontoid fracture, this is his most recent cervical fusion in the setting of multiple previous cervical fusions leaving him with cumulative fusion C1 to T1.  Headaches are daily, primarily right sided, aggravated by activity - particularly while driving his 3 wheel motorcycle.    He has known lumbar spinal stenosis with neurogenic claudication, these symptoms are worse and have been refractory to medical management thus far.  He continues to be quite limited with regard to his ambulation.    He has noticed a positive response to mexiletine.  Previously, the patient has been on several neuropathic pain medications including Lyrica, tricyclic antidepressants, gabapentin without relief.      Headache    This is a chronic problem. The current episode started more than 1 year ago. The problem occurs daily. The problem has been unchanged. The pain is located in the right unilateral, frontal, retro-orbital, temporal and occipital region. The pain radiates to the right neck. The pain quality is similar to prior headaches. The quality of the pain is described as aching and sharp. The pain is severe. Associated symptoms include back pain, neck pain, numbness, scalp tenderness and weakness. The symptoms are aggravated by activity and Valsalva. He has tried oral narcotics, acetaminophen and NSAIDs for the symptoms. The treatment provided mild relief. (Extensive cervical fusion)        The following portions of the patient's history were reviewed and updated as appropriate: allergies, current medications, past family history, past medical history, past social history, past surgical history and problem list.    Review of Systems   Constitutional: Positive for fatigue.   HENT: Positive for trouble swallowing.    Eyes: Negative.    Respiratory: Negative.     Cardiovascular: Negative.    Gastrointestinal: Negative.    Endocrine: Negative.    Genitourinary: Negative.    Musculoskeletal: Positive for back pain, gait problem, neck pain and neck stiffness.   Skin: Negative.    Allergic/Immunologic: Negative.    Neurological: Positive for tremors, weakness, numbness and headache. Negative for speech difficulty.   Hematological: Negative.    Psychiatric/Behavioral: Negative.          Current Outpatient Medications:   •  aspirin 81 MG tablet, Take 1 tablet by mouth Daily., Disp: , Rfl:   •  mexiletine (MEXITIL) 150 MG capsule, Take 2 capsules by mouth 2 (Two) Times a Day., Disp: 120 capsule, Rfl: 5  •  oxyCODONE-acetaminophen (PERCOCET)  MG per tablet, Take 1 tablet by mouth Every 6 (Six) Hours As Needed for Moderate Pain ., Disp: , Rfl:   •  traZODone (DESYREL) 50 MG tablet, Take 2 tablets by mouth every night at bedtime., Disp: 60 tablet, Rfl: 5     Objective   Physical Exam   Constitutional: He is oriented to person, place, and time. Vital signs are normal.   HENT:   Head: Normocephalic.   Right Ear: Hearing and external ear normal.   Left Ear: Hearing and external ear normal.   Nose: Nose normal.   Mouth/Throat: Uvula is midline, oropharynx is clear and moist and mucous membranes are normal.   Eyes: Pupils are equal, round, and reactive to light. Conjunctivae, EOM and lids are normal. No scleral icterus.   Neck: Trachea normal and phonation normal. No JVD present. Muscular tenderness present. Carotid bruit is not present. Decreased range of motion present.   Cardiovascular: Normal rate and normal heart sounds.   Pulmonary/Chest: Effort normal and breath sounds normal.   Musculoskeletal:        Cervical back: He exhibits decreased range of motion and pain.        Lumbar back: He exhibits decreased range of motion and pain.   Neurological: He is alert and oriented to person, place, and time. He displays no atrophy and no tremor. A sensory deficit is present. No  cranial nerve deficit. He exhibits normal muscle tone. Gait abnormal. GCS eye subscore is 4. GCS verbal subscore is 5. GCS motor subscore is 6.   Reflex Scores:       Bicep reflexes are 2+ on the right side and 2+ on the left side.       Brachioradialis reflexes are 2+ on the right side and 2+ on the left side.       Patellar reflexes are 2+ on the right side and 2+ on the left side.  The patient's movement and motor coordination is difficult because of his musculoskeletal state.  Patient has a long segment fusion in his cervical spine, significant kyphotic deformity in the thoracic spine rendering his gait unstable and his overall motor capacity greatly diminished.   Skin: Skin is warm, dry and intact.   Psychiatric: He has a normal mood and affect. His speech is normal and behavior is normal. Judgment and thought content normal. Cognition and memory are normal.   Nursing note and vitals reviewed.        Assessment/Plan   Vikas was seen today for neurologic problem and headache.    Diagnoses and all orders for this visit:    Cerebrovascular small vessel disease    Other headache syndrome    Spinal stenosis of lumbar region with neurogenic claudication    Neuralgia    Patient is neurologically stable.  He has responded well to mexiletine.  The patient relates that he feels the mexiletine has made him at least more functional with regard to daily activities.  It has significantly decreased his neuropathic pain.    Today's findings and recommendations are discussed in detail with the patient.  Safety issues, activity recommendations, patient questions and concerns are reviewed carefully.      20 minutes of a 25 minute outpatient visit was spent in counseling and coordination of care today.           Dictated utilizing Dragon dictation.

## 2020-08-24 ENCOUNTER — HOSPITAL ENCOUNTER (OUTPATIENT)
Dept: MRI IMAGING | Facility: HOSPITAL | Age: 70
Discharge: HOME | End: 2020-08-24
Admitting: PAIN MEDICINE

## 2020-08-24 DIAGNOSIS — M51.16 LUMBAR DISC DISEASE WITH RADICULOPATHY: ICD-10-CM

## 2020-08-24 PROCEDURE — 72148 MRI LUMBAR SPINE W/O DYE: CPT

## 2020-09-29 ENCOUNTER — TELEPHONE (OUTPATIENT)
Dept: UROLOGY | Facility: CLINIC | Age: 70
End: 2020-09-29

## 2020-09-29 NOTE — TELEPHONE ENCOUNTER
Called pt and left a message for the pt to call back.  Offered to see the pt at the [facility] instead of the [facility]

## 2020-10-07 ENCOUNTER — OFFICE VISIT (OUTPATIENT)
Dept: NEUROSURGERY | Facility: CLINIC | Age: 70
End: 2020-10-07

## 2020-10-07 VITALS
WEIGHT: 145.8 LBS | HEIGHT: 70 IN | BODY MASS INDEX: 20.87 KG/M2 | DIASTOLIC BLOOD PRESSURE: 92 MMHG | SYSTOLIC BLOOD PRESSURE: 128 MMHG

## 2020-10-07 DIAGNOSIS — M79.605 PAIN IN BOTH LOWER EXTREMITIES: Primary | ICD-10-CM

## 2020-10-07 DIAGNOSIS — M79.604 PAIN IN BOTH LOWER EXTREMITIES: Primary | ICD-10-CM

## 2020-10-07 DIAGNOSIS — Z78.9 NON-SMOKER: ICD-10-CM

## 2020-10-07 PROCEDURE — 99214 OFFICE O/P EST MOD 30 MIN: CPT | Performed by: NURSE PRACTITIONER

## 2020-10-07 NOTE — PATIENT INSTRUCTIONS
Advance Care Planning and Advance Directives     You make decisions on a daily basis - decisions about where you want to live, your career, your home, your life. Perhaps one of the most important decisions you face is your choice for future medical care. Take time to talk with your family and your healthcare team and start planning today.  Advance Care Planning is a process that can help you:  · Understand possible future healthcare decisions in light of your own experiences  · Reflect on those decision in light of your goals and values  · Discuss your decisions with those closest to you and the healthcare professionals that care for you  · Make a plan by creating a document that reflects your wishes    Surrogate Decision Maker  In the event of a medical emergency, which has left you unable to communicate or to make your own decisions, you would need someone to make decisions for you.  It is important to discuss your preferences for medical treatment with this person while you are in good health.     Qualities of a surrogate decision maker:  • Willing to take on this role and responsibility  • Knows what you want for future medical care  • Willing to follow your wishes even if they don't agree with them  • Able to make difficult medical decisions under stressful circumstances    Advance Directives  These are legal documents you can create that will guide your healthcare team and decision maker(s) when needed. These documents can be stored in the electronic medical record.    · Living Will - a legal document to guide your care if you have a terminal condition or a serious illness and are unable to communicate. States vary by statute in document names/types, but most forms may include one or more of the following:        -  Directions regarding life-prolonging treatments        -  Directions regarding artificially provided nutrition/hydration        -  Choosing a healthcare decision maker        -  Direction  regarding organ/tissue donation    · Durable Power of  for Healthcare - this document names an -in-fact to make medical decisions for you, but it may also allow this person to make personal and financial decisions for you. Please seek the advice of an  if you need this type of document.    **Advance Directives are not required and no one may discriminate against you if you do not sign one.    Medical Orders  Many states allow specific forms/orders signed by your physician to record your wishes for medical treatment in your current state of health. This form, signed in personal communication with your physician, addresses resuscitation and other medical interventions that you may or may not want.      For more information or to schedule a time with a [facility] Advance Care Planning Facilitator contact: [email or web address] or call [phone] and someone will contact you directly.

## 2020-10-07 NOTE — PROGRESS NOTES
Chief complaint:   Chief Complaint   Patient presents with   • Leg Pain     Vikas is here today with complaint of bilateral leg pain, he states he has some back pain but his main concern is his leg pain.  He states he can barely walk. He does treat with the pain management center for his pain but this is no longer helping him.  He has had a recent MRI of his lumbar in September here at .        Subjective     HPI: This is a 70-year-old male gentleman who was referred to us today for bilateral lower extremity pain.  He is here to be evaluated today.  The patient states that he has had 3 previous back surgeries.  He does know that to the surgeries by Dr. Bonilla.  He says that he is not complaining of a lot of back pain that he does deal with chronic back pain.  His biggest complaint today is pain in his bilateral calves.  He does not describe this as a radicular pain.  He says it is equal in both legs.  It is worse with walking and better with medication.  He denies any bowel or bladder incontinence.  He has not done any recent physical therapy, chiropractic care.  He does work with pain management and receives 4 Percocets a day.  He is not had any injections in the last 3 years.  Rates his pain on a scale of 0-10 at an 8.  He says it does interfere with his actives of daily living.    Review of Systems   Constitutional: Positive for activity change.   Musculoskeletal: Positive for arthralgias, back pain, gait problem and myalgias.   Neurological: Negative for weakness and numbness.   All other systems reviewed and are negative.       Past Medical History:   Diagnosis Date   • Anxiety    • Chronic kidney disease    • Chronic pain syndrome    • GI bleeding    • History of transfusion    • Hyperlipidemia    • Hypertension    • Injury of back    • Insomnia    • Neck injury    • PAF (paroxysmal atrial fibrillation) (CMS/HCC)    • Therapeutic opioid induced constipation      Past Surgical History:   Procedure  "Laterality Date   • BACK SURGERY     • CERVICAL LAMINECTOMY DECOMPRESSION POSTERIOR N/A 2/27/2018    Procedure: CERVICAL  POSTERIOR INSTRUMENTED FUSION C1-4;  Surgeon: Bandar Donis MD;  Location: [facility] OR;  Service:    • ODONTOID FRACTURE SURGERY       Family History   Problem Relation Age of Onset   • Cancer Mother    • No Known Problems Father      Social History     Tobacco Use   • Smoking status: Never Smoker   • Smokeless tobacco: Never Used   Substance Use Topics   • Alcohol use: No   • Drug use: No     (Not in a hospital admission)    Allergies:  Eliquis [apixaban], Codeine, Declomycin [demeclocycline], and Tylenol [acetaminophen]    Objective      Vital Signs  /92 (BP Location: Right arm, Patient Position: Sitting)   Ht 177.8 cm (70\")   Wt 66.1 kg (145 lb 12.8 oz)   BMI 20.92 kg/m²     Physical Exam  Constitutional:       Appearance: Normal appearance. He is well-developed.   HENT:      Head: Normocephalic.   Eyes:      General: Lids are normal.      Conjunctiva/sclera: Conjunctivae normal.      Pupils: Pupils are equal, round, and reactive to light.   Neck:      Musculoskeletal: Normal range of motion.   Cardiovascular:      Rate and Rhythm: Normal rate and regular rhythm.   Pulmonary:      Effort: Pulmonary effort is normal.      Breath sounds: Normal breath sounds.   Musculoskeletal: Normal range of motion.      Lumbar back: He exhibits pain.   Skin:     General: Skin is warm.   Neurological:      Mental Status: He is alert and oriented to person, place, and time.      GCS: GCS eye subscore is 4. GCS verbal subscore is 5. GCS motor subscore is 6.      Cranial Nerves: No cranial nerve deficit.      Sensory: No sensory deficit.      Gait: Gait abnormal.      Deep Tendon Reflexes: Reflexes normal.      Reflex Scores:       Patellar reflexes are 2+ on the right side and 3+ on the left side.     Comments: Patient does have a kyphotic posture.  He does walk without assistance however his gait does " have outward toeing   Psychiatric:         Speech: Speech normal.         Behavior: Behavior normal.         Thought Content: Thought content normal.         Neurologic Exam     Mental Status   Oriented to person, place, and time.   Speech: speech is normal     Cranial Nerves     CN III, IV, VI   Pupils are equal, round, and reactive to light.    Gait, Coordination, and Reflexes     Reflexes   Right patellar: 2+  Left patellar: 3+  Right ankle clonus: absent  Left ankle clonus: absent      Results Review: MRI of the lumbar spine shows patient does have disc degeneration at L5-S1 and lumbar stenosis with disc displacement causing left-sided foraminal narrowing.  Patient has had multilevel laminectomy L2 to L5 with posterior pedicle screw instrumentation..  There does appear to be an interbody device at L2-3 and L4-5 but no interbody device at L3-4 there is a chronic compression deformity of L1.  There also does appear to be left-sided foraminal narrowing at L4-5.        Assessment/Plan: I am going to send the patient for nerve conduction study of the bilateral lower extremities.  He is not fitting any atypical lumbar radiculopathy pattern and also given the extent of the surgeries had in his back I am not confident that any additional surgery would be of a significant benefit to him.  We will send him for the nerve conduction study and have him follow-up with Dr. Donis once that is completed.  I also did encourage him to have injections done again by Dr. Holbrook.  He was told to call us if any worsening issues  Patient is a nonsmoker  The patient's BMI is Body mass index is 20.92 kg/m². today which shows the patient is Normal: 18.5-24.9kg/m2. BMI chart was given to the patient.   Advance Care Planning   ACP discussion was held with the patient during this visit. Patient does not have an advance directive, information provided.      Vikas was seen today for leg pain.    Diagnoses and all orders for this  visit:    Pain in both lower extremities  -     EMG & Nerve Conduction Test; Future    Non-smoker    BMI 25.0-25.9,adult          I discussed the patients findings and my recommendations with patient    ROXIE Mcgarry  10/07/20  14:32 CDT

## 2020-10-27 ENCOUNTER — APPOINTMENT (OUTPATIENT)
Dept: NEUROLOGY | Facility: HOSPITAL | Age: 70
End: 2020-10-27

## 2020-11-01 ENCOUNTER — RESULTS ENCOUNTER (OUTPATIENT)
Dept: UROLOGY | Facility: CLINIC | Age: 70
End: 2020-11-01

## 2020-11-01 DIAGNOSIS — C61 MALIGNANT NEOPLASM OF PROSTATE (HCC): ICD-10-CM

## 2020-11-03 ENCOUNTER — APPOINTMENT (OUTPATIENT)
Dept: NEUROLOGY | Facility: HOSPITAL | Age: 70
End: 2020-11-03

## 2020-11-11 ENCOUNTER — HOSPITAL ENCOUNTER (OUTPATIENT)
Dept: NEUROLOGY | Facility: HOSPITAL | Age: 70
Discharge: HOME | End: 2020-11-11
Admitting: NURSE PRACTITIONER

## 2020-11-11 DIAGNOSIS — M79.604 PAIN IN BOTH LOWER EXTREMITIES: ICD-10-CM

## 2020-11-11 DIAGNOSIS — M79.605 PAIN IN BOTH LOWER EXTREMITIES: ICD-10-CM

## 2020-11-11 PROCEDURE — 95911 NRV CNDJ TEST 9-10 STUDIES: CPT

## 2020-11-11 PROCEDURE — 95886 MUSC TEST DONE W/N TEST COMP: CPT

## 2020-11-24 DIAGNOSIS — G44.89 OTHER HEADACHE SYNDROME: ICD-10-CM

## 2020-11-24 DIAGNOSIS — M48.062 SPINAL STENOSIS OF LUMBAR REGION WITH NEUROGENIC CLAUDICATION: ICD-10-CM

## 2020-11-24 DIAGNOSIS — S12.110D CLOSED ODONTOID FRACTURE WITH TYPE II MORPHOLOGY, ANTERIOR DISPLACEMENT, AND ROUTINE HEALING, SUBSEQUENT ENCOUNTER: ICD-10-CM

## 2020-11-24 RX ORDER — TRAZODONE HYDROCHLORIDE 50 MG/1
TABLET ORAL
Qty: 60 TABLET | Refills: 5 | Status: SHIPPED | OUTPATIENT
Start: 2020-11-24 | End: 2021-07-14 | Stop reason: SDUPTHER

## 2020-12-03 ENCOUNTER — CLINICAL SUPPORT (OUTPATIENT)
Dept: INTERNAL MEDICINE | Facility: CLINIC | Age: 70
End: 2020-12-03

## 2020-12-03 DIAGNOSIS — C61 PROSTATE CANCER (HCC): Primary | ICD-10-CM

## 2020-12-03 PROCEDURE — 36415 COLL VENOUS BLD VENIPUNCTURE: CPT | Performed by: INTERNAL MEDICINE

## 2020-12-03 NOTE — PROGRESS NOTES
Venipuncture Blood Specimen Collection  Venipuncture performed in right arm by Marine Huggins CMA with good hemostasis. Patient tolerated the procedure well without complications.   12/03/20   Marine Huggins CMA

## 2020-12-04 LAB — PSA SERPL-MCNC: 8 NG/ML (ref 0–4)

## 2020-12-04 NOTE — PROGRESS NOTES
Subjective    Mr. Cruz is 70 y.o. male    Chief Complaint: Prostate Cancer    History of Present Illness    70-year-old male established patient  follow-up for elevated PSA.  Timing drawn 12/3/2020.  Severity stable at 8.0 from 7.9 and from 5.1 but only slightly elevated from 6.58 2019.  Context he is on active surveillance for Hamilton 6 prostate cancer originally diagnosed June 2018 by Dr. Rivera which showed 20% of 1 of 2 cores with Hamilton 6 prostate cancer.  Most recent biopsy done 10/24/2019 with TRUS volume of prostate 13 cc and showing stable pathology of Hamilton 6 disease in 20% of left mid cores.    Associated symptoms he denies hematuria, flank pain, or bone pain.    Lab Results   Component Value Date    PSA 8.0 (H) 12/03/2020    PSA 7.990 (H) 08/05/2020    PSA 5.190 (H) 01/31/2020       The following portions of the patient's history were reviewed and updated as appropriate: allergies, current medications, past family history, past medical history, past social history, past surgical history and problem list.    Review of Systems   Constitutional: Negative for chills and fever.   Gastrointestinal: Negative for abdominal pain, anal bleeding and blood in stool.   Genitourinary: Negative for dysuria and hematuria.   All other systems reviewed and are negative.        Current Outpatient Medications:   •  aspirin 81 MG tablet, Take 1 tablet by mouth Daily., Disp: , Rfl:   •  mexiletine (MEXITIL) 150 MG capsule, Take 2 capsules by mouth 2 (Two) Times a Day., Disp: 120 capsule, Rfl: 5  •  oxyCODONE-acetaminophen (PERCOCET)  MG per tablet, Take 1 tablet by mouth Every 6 (Six) Hours As Needed for Moderate Pain ., Disp: , Rfl:   •  traZODone (DESYREL) 50 MG tablet, TAKE TWO TABLETS BY MOUTH EVERY NIGHT AT BEDTIME, Disp: 60 tablet, Rfl: 5    Past Medical History:   Diagnosis Date   • Anxiety    • Chronic kidney disease    • Chronic pain syndrome    • GI bleeding    • History of transfusion    • Hyperlipidemia     • Hypertension    • Injury of back    • Insomnia    • Neck injury    • PAF (paroxysmal atrial fibrillation) (CMS/HCC)    • Therapeutic opioid induced constipation        Past Surgical History:   Procedure Laterality Date   • BACK SURGERY     • CERVICAL LAMINECTOMY DECOMPRESSION POSTERIOR N/A 2/27/2018    Procedure: CERVICAL  POSTERIOR INSTRUMENTED FUSION C1-4;  Surgeon: Bandar Donis MD;  Location: [facility];  Service:    • ODONTOID FRACTURE SURGERY         Social History     Socioeconomic History   • Marital status:      Spouse name: Not on file   • Number of children: Not on file   • Years of education: Not on file   • Highest education level: Not on file   Tobacco Use   • Smoking status: Never Smoker   • Smokeless tobacco: Never Used   Substance and Sexual Activity   • Alcohol use: No   • Drug use: No   • Sexual activity: Defer       Family History   Problem Relation Age of Onset   • Cancer Mother    • No Known Problems Father        Objective    There were no vitals taken for this visit.    Physical Exam  Constitutional: Well nourished, Well developed; No apparent distress; Vital reviewed as above  Psychiatric: Appropriate affect; Alert and oriented  Eyes: Unremarkable  Musculoskeletal: Normal gait and station  GI: Abdomen is soft, non-tender  Respiratory: No distress; Unlabored movement; No accessory musculature needed with symmetric movements  Skin: No pallor or diaphoresis  Lymphatic: No adenopathy neck or groin      Results for orders placed or performed in visit on 12/03/20   PSA DIAGNOSTIC ONLY    Specimen: Arm, Right; Blood    BLOOD   Result Value Ref Range    PSA 8.0 (H) 0.0 - 4.0 ng/mL     Assessment and Plan    Diagnoses and all orders for this visit:    1. Malignant neoplasm of prostate (CMS/HCC) (Primary)  -     Cancel: POC Urinalysis Dipstick, Multipro  -     PSA DIAGNOSTIC; Future    Overall stable PSA on active surveillance for Hamilton 6 prostate cancer.  I recommended follow-up with  me in 6 months with pre-clinic PSA.         This document has been signed by JONATHAN Seay MD on December 11, 2020 12:45 CST

## 2020-12-10 ENCOUNTER — OFFICE VISIT (OUTPATIENT)
Dept: UROLOGY | Facility: CLINIC | Age: 70
End: 2020-12-10

## 2020-12-10 VITALS — HEIGHT: 70 IN | TEMPERATURE: 98.2 F | WEIGHT: 142.4 LBS | BODY MASS INDEX: 20.39 KG/M2

## 2020-12-10 DIAGNOSIS — C61 MALIGNANT NEOPLASM OF PROSTATE (HCC): Primary | ICD-10-CM

## 2020-12-10 PROCEDURE — 99213 OFFICE O/P EST LOW 20 MIN: CPT | Performed by: UROLOGY

## 2021-01-18 ENCOUNTER — TELEPHONE (OUTPATIENT)
Dept: NEUROSURGERY | Facility: CLINIC | Age: 71
End: 2021-01-18

## 2021-01-18 NOTE — TELEPHONE ENCOUNTER
Called to confirm patient's appt with Dr Donis on 1/19/21 - no answer so left a message to call me back      luis gifford CMA

## 2021-01-19 ENCOUNTER — OFFICE VISIT (OUTPATIENT)
Dept: NEUROSURGERY | Facility: CLINIC | Age: 71
End: 2021-01-19

## 2021-01-19 VITALS
HEIGHT: 70 IN | BODY MASS INDEX: 20.33 KG/M2 | SYSTOLIC BLOOD PRESSURE: 114 MMHG | WEIGHT: 142 LBS | DIASTOLIC BLOOD PRESSURE: 68 MMHG

## 2021-01-19 DIAGNOSIS — M79.605 PAIN IN BOTH LOWER EXTREMITIES: ICD-10-CM

## 2021-01-19 DIAGNOSIS — M79.604 PAIN IN BOTH LOWER EXTREMITIES: ICD-10-CM

## 2021-01-19 DIAGNOSIS — Z78.9 NON-SMOKER: ICD-10-CM

## 2021-01-19 DIAGNOSIS — R29.898 WEAKNESS OF BOTH LOWER EXTREMITIES: Primary | ICD-10-CM

## 2021-01-19 DIAGNOSIS — M48.062 SPINAL STENOSIS OF LUMBAR REGION WITH NEUROGENIC CLAUDICATION: ICD-10-CM

## 2021-01-19 PROCEDURE — 99213 OFFICE O/P EST LOW 20 MIN: CPT | Performed by: NEUROLOGICAL SURGERY

## 2021-01-19 NOTE — PROGRESS NOTES
SUBJECTIVE:  Patient ID: Vikas Cruz is a 70 y.o. male is here today for follow-up.    Chief Complaint: Lower extremity pain  Chief Complaint   Patient presents with   • Leg pain & abnormal gait     patient is here to discuss EMG results ([facility] on 11/11/2020); patient did have a lumbar spine MRI @ [facility] on 8/24/2020.  Patient has done PMI without any relief of his symptoms.       HPI  70-year-old gentleman we took care of in February 2018 after a C1-2 fracture.  He required posterior instrumented fusion from C1-C4.  Has had multiple complicated cervical surgeries prior to this.  He has had a multilevel lumbar decompression instrumentation also several years prior to this.  He complains of back pain and chronic pain issues.  His biggest complaint is lower extremity pain in his calfs.  He works with [employer] management.  He also works with Dr. Clements for treatment of neuropathic pain syndrome.  He currently is walking without a walker.  We sent him for an EMG nerve conduction studies here to discuss the results.    The following portions of the patient's history were reviewed and updated as appropriate: allergies, current medications, past family history, past medical history, past social history, past surgical history and problem list.    OBJECTIVE:    Review of Systems   Musculoskeletal: Positive for arthralgias, back pain and gait problem.   All other systems reviewed and are negative.         Physical Exam  Eyes:      Extraocular Movements: EOM normal.      Pupils: Pupils are equal, round, and reactive to light.   Neurological:      Mental Status: He is oriented to person, place, and time.      Coordination: Finger-Nose-Finger Test normal.      Deep Tendon Reflexes: Strength normal.   Psychiatric:         Speech: Speech normal.         Neurologic Exam     Mental Status   Oriented to person, place, and time.   Attention: normal.   Speech: speech is normal   Level of consciousness: alert  Knowledge: good.      Cranial Nerves     CN II   Visual fields full to confrontation.     CN III, IV, VI   Pupils are equal, round, and reactive to light.  Extraocular motions are normal.     CN V   Facial sensation intact.     CN VII   Facial expression full, symmetric.     CN VIII   CN VIII normal.     CN IX, X   CN IX normal.   CN X normal.     CN XI   CN XI normal.     CN XII   CN XII normal.     Motor Exam   Muscle bulk: normal  Overall muscle tone: normal  Right arm pronator drift: absent  Left arm pronator drift: absent    Strength   Strength 5/5 throughout.     Sensory Exam   Light touch normal.   Pinprick normal.     Gait, Coordination, and Reflexes     Gait  Gait: (Slow shuffling gait.  Duck footed orientation of the feet)    Coordination   Finger to nose coordination: normal    Tremor   Resting tremor: absent  Intention tremor: absent  Action tremor: absent    Reflexes   Reflexes 2+ except as noted.     Severe thoracic kyphotic deformity    Independent Review of Radiographic Studies:   EMG nerve conduction studies consistent with chronic radiculopathies    ASSESSMENT/PLAN:  The patient has chronic back pain and leg pain.  He had an MRI in August which shows extensive postoperative changes.  There is no surgical issue with his back at this point nor is there any surgery to offer that would improve his gait problems.  His gait issue is is multifactorial related to his posture , his deformity, his multiple spine surgeries, his pain issues.  I explained to this to him extensively.  His questions and concerns were addressed.  Perhaps he may be a candidate at some point for dorsal column stimulator trial.      1. Weakness of both lower extremities    2. Pain in both lower extremities    3. Spinal stenosis of lumbar region with neurogenic claudication    4. Non-smoker    5. Body mass index (BMI) of 20.0 to 20.9 in adult        The patient's Body mass index is 20.37 kg/m².. BMI is within normal parameters. No follow-up  required.    Return if symptoms worsen or fail to improve.      Bandar Donis MD

## 2021-01-19 NOTE — PATIENT INSTRUCTIONS
PATIENT TO CONTINUE TO FOLLOW UP WITH HIS PRIMARY CARE PROVIDER FOR YEARLY PHYSICAL EXAMS TO ENSURE COMPLETE HEALTH MAINTENANCE

## 2021-01-22 DIAGNOSIS — M48.062 SPINAL STENOSIS OF LUMBAR REGION WITH NEUROGENIC CLAUDICATION: ICD-10-CM

## 2021-01-22 DIAGNOSIS — M79.2 NEURALGIA: ICD-10-CM

## 2021-01-25 RX ORDER — MEXILETINE HYDROCHLORIDE 150 MG/1
CAPSULE ORAL
Qty: 120 CAPSULE | Refills: 5 | Status: SHIPPED | OUTPATIENT
Start: 2021-01-25 | End: 2021-07-14 | Stop reason: SDUPTHER

## 2021-02-15 ENCOUNTER — TELEPHONE (OUTPATIENT)
Dept: NEUROLOGY | Facility: CLINIC | Age: 71
End: 2021-02-15

## 2021-02-15 NOTE — TELEPHONE ENCOUNTER
Provider: BRYANNA CELESTIN  Caller: PT  Relationship to Patient: SELF    Phone [phone]      Reason for Call: PT CALLED IN TODAY TO R/S HIS APPT WITH BRYANNA CELESTIN THAT WAS TRE FOR 2/16/21. CANCELLED APPT PER PTS REQUEST AND WAS GETTING READY TO LOOK FOR AVAILABILITY TO R/S WHEN CALL WAS DROPPED. ATTEMPTED TO CALL PT BACK. CALL SOUNDED LIKE IT WAS ANSWERED AND THEN HUNG UP.UNABLE TO R/S PT'S APPT. PLEASE BE ADVISED

## 2021-07-14 DIAGNOSIS — S12.110D CLOSED ODONTOID FRACTURE WITH TYPE II MORPHOLOGY, ANTERIOR DISPLACEMENT, AND ROUTINE HEALING, SUBSEQUENT ENCOUNTER: ICD-10-CM

## 2021-07-14 DIAGNOSIS — M79.2 NEURALGIA: ICD-10-CM

## 2021-07-14 DIAGNOSIS — G44.89 OTHER HEADACHE SYNDROME: ICD-10-CM

## 2021-07-14 DIAGNOSIS — M48.062 SPINAL STENOSIS OF LUMBAR REGION WITH NEUROGENIC CLAUDICATION: ICD-10-CM

## 2021-07-14 RX ORDER — MEXILETINE HYDROCHLORIDE 150 MG/1
300 CAPSULE ORAL 2 TIMES DAILY
Qty: 120 CAPSULE | Refills: 0 | Status: SHIPPED | OUTPATIENT
Start: 2021-07-14 | End: 2021-07-26

## 2021-07-14 RX ORDER — TRAZODONE HYDROCHLORIDE 50 MG/1
100 TABLET ORAL NIGHTLY
Qty: 60 TABLET | Refills: 0 | Status: SHIPPED | OUTPATIENT
Start: 2021-07-14 | End: 2021-07-20

## 2021-07-20 DIAGNOSIS — G44.89 OTHER HEADACHE SYNDROME: ICD-10-CM

## 2021-07-20 DIAGNOSIS — M48.062 SPINAL STENOSIS OF LUMBAR REGION WITH NEUROGENIC CLAUDICATION: ICD-10-CM

## 2021-07-20 DIAGNOSIS — S12.110D CLOSED ODONTOID FRACTURE WITH TYPE II MORPHOLOGY, ANTERIOR DISPLACEMENT, AND ROUTINE HEALING, SUBSEQUENT ENCOUNTER: ICD-10-CM

## 2021-07-20 RX ORDER — TRAZODONE HYDROCHLORIDE 50 MG/1
TABLET ORAL
Qty: 60 TABLET | Refills: 0 | Status: SHIPPED | OUTPATIENT
Start: 2021-07-20 | End: 2021-08-23 | Stop reason: SDUPTHER

## 2021-07-24 DIAGNOSIS — M48.062 SPINAL STENOSIS OF LUMBAR REGION WITH NEUROGENIC CLAUDICATION: ICD-10-CM

## 2021-07-24 DIAGNOSIS — M79.2 NEURALGIA: ICD-10-CM

## 2021-07-26 RX ORDER — MEXILETINE HYDROCHLORIDE 150 MG/1
CAPSULE ORAL
Qty: 120 CAPSULE | Refills: 0 | Status: SHIPPED | OUTPATIENT
Start: 2021-07-26 | End: 2021-08-23 | Stop reason: SDUPTHER

## 2021-08-19 DIAGNOSIS — M79.2 NEURALGIA: ICD-10-CM

## 2021-08-19 DIAGNOSIS — M48.062 SPINAL STENOSIS OF LUMBAR REGION WITH NEUROGENIC CLAUDICATION: ICD-10-CM

## 2021-08-23 ENCOUNTER — OFFICE VISIT (OUTPATIENT)
Dept: NEUROLOGY | Facility: CLINIC | Age: 71
End: 2021-08-23

## 2021-08-23 VITALS
HEART RATE: 89 BPM | WEIGHT: 142 LBS | DIASTOLIC BLOOD PRESSURE: 78 MMHG | RESPIRATION RATE: 17 BRPM | OXYGEN SATURATION: 98 % | BODY MASS INDEX: 20.33 KG/M2 | HEIGHT: 70 IN | SYSTOLIC BLOOD PRESSURE: 114 MMHG

## 2021-08-23 DIAGNOSIS — M48.062 SPINAL STENOSIS OF LUMBAR REGION WITH NEUROGENIC CLAUDICATION: ICD-10-CM

## 2021-08-23 DIAGNOSIS — M79.2 NEURALGIA: ICD-10-CM

## 2021-08-23 DIAGNOSIS — G44.89 OTHER HEADACHE SYNDROME: ICD-10-CM

## 2021-08-23 DIAGNOSIS — S12.110D CLOSED ODONTOID FRACTURE WITH TYPE II MORPHOLOGY, ANTERIOR DISPLACEMENT, AND ROUTINE HEALING, SUBSEQUENT ENCOUNTER: ICD-10-CM

## 2021-08-23 PROCEDURE — 99214 OFFICE O/P EST MOD 30 MIN: CPT | Performed by: PHYSICIAN ASSISTANT

## 2021-08-23 RX ORDER — AMLODIPINE BESYLATE 2.5 MG/1
2.5 TABLET ORAL DAILY
COMMUNITY
Start: 2021-08-20 | End: 2022-02-24 | Stop reason: HOSPADM

## 2021-08-23 RX ORDER — MEXILETINE HYDROCHLORIDE 150 MG/1
300 CAPSULE ORAL 2 TIMES DAILY
Qty: 360 CAPSULE | Refills: 3 | Status: SHIPPED | OUTPATIENT
Start: 2021-08-23

## 2021-08-23 RX ORDER — MEXILETINE HYDROCHLORIDE 150 MG/1
CAPSULE ORAL
Qty: 120 CAPSULE | Refills: 0 | OUTPATIENT
Start: 2021-08-23

## 2021-08-23 RX ORDER — TRAZODONE HYDROCHLORIDE 50 MG/1
100 TABLET ORAL NIGHTLY
Qty: 180 TABLET | Refills: 3 | Status: SHIPPED | OUTPATIENT
Start: 2021-08-23

## 2021-08-23 RX ORDER — ATORVASTATIN CALCIUM 10 MG/1
10 TABLET, FILM COATED ORAL DAILY
COMMUNITY
Start: 2021-06-25

## 2021-09-02 NOTE — PROGRESS NOTES
Subjective   Vikas Cruz is a 71 y.o. male is here today for follow-up.    This patient has developed headaches, having onset after upper posterior cervical fusion for odontoid fracture, this is his most recent cervical fusion in the setting of multiple previous cervical fusions leaving him with cumulative fusion C1 to T1.    He has known lumbar spinal stenosis with neurogenic claudication, these symptoms are worse and have been refractory to medical management thus far.  He continues to be quite limited with regard to his ambulation.             The following portions of the patient's history were reviewed and updated as appropriate: allergies, current medications, past family history, past medical history, past social history, past surgical history and problem list.    Review of Systems   Constitutional: Positive for activity change and fatigue.   Eyes: Negative.    Respiratory: Positive for shortness of breath.    Cardiovascular: Positive for leg swelling.   Musculoskeletal: Positive for arthralgias, back pain, gait problem and neck pain.   Skin: Negative.    Neurological: Positive for tremors, weakness and numbness.   Psychiatric/Behavioral: Positive for sleep disturbance.         Current Outpatient Medications:   •  amLODIPine (NORVASC) 2.5 MG tablet, Take 2.5 mg by mouth Daily., Disp: , Rfl:   •  aspirin 81 MG tablet, Take 1 tablet by mouth Daily., Disp: , Rfl:   •  atorvastatin (LIPITOR) 10 MG tablet, Take 10 mg by mouth Daily., Disp: , Rfl:   •  mexiletine (MEXITIL) 150 MG capsule, Take 2 capsules by mouth 2 (Two) Times a Day., Disp: 360 capsule, Rfl: 3  •  oxyCODONE-acetaminophen (PERCOCET)  MG per tablet, Take 1 tablet by mouth Every 6 (Six) Hours As Needed for Moderate Pain ., Disp: , Rfl:   •  traZODone (DESYREL) 50 MG tablet, Take 2 tablets by mouth Every Night., Disp: 180 tablet, Rfl: 3     Objective   Physical Exam  Vitals and nursing note reviewed.   HENT:      Head: Normocephalic.      Right  Ear: External ear normal. Decreased hearing noted.      Left Ear: External ear normal. Decreased hearing noted.   Eyes:      General: Lids are normal. Gaze aligned appropriately.      Extraocular Movements: Extraocular movements intact.      Conjunctiva/sclera: Conjunctivae normal.   Cardiovascular:      Rate and Rhythm: Normal rate.      Heart sounds: Normal heart sounds.   Pulmonary:      Effort: Pulmonary effort is normal.      Breath sounds: Normal breath sounds.   Skin:     General: Skin is warm and dry.   Neurological:      Mental Status: He is alert and oriented to person, place, and time.      GCS: GCS eye subscore is 4. GCS verbal subscore is 5. GCS motor subscore is 6.      Cranial Nerves: Cranial nerves are intact.      Sensory: Sensory deficit present.      Motor: Weakness and tremor present.      Gait: Gait abnormal.      Deep Tendon Reflexes:      Reflex Scores:       Bicep reflexes are 1+ on the right side and 1+ on the left side.       Brachioradialis reflexes are 1+ on the right side and 1+ on the left side.       Patellar reflexes are 0 on the right side and 0 on the left side.  Psychiatric:         Attention and Perception: Attention and perception normal.         Mood and Affect: Mood is anxious.         Speech: Speech normal.         Behavior: Behavior is cooperative.         Cognition and Memory: Cognition normal.         Judgment: Judgment is impulsive.           Assessment/Plan   Diagnoses and all orders for this visit:    1. Spinal stenosis of lumbar region with neurogenic claudication  -     mexiletine (MEXITIL) 150 MG capsule; Take 2 capsules by mouth 2 (Two) Times a Day.  Dispense: 360 capsule; Refill: 3  -     traZODone (DESYREL) 50 MG tablet; Take 2 tablets by mouth Every Night.  Dispense: 180 tablet; Refill: 3    2. Neuralgia  -     mexiletine (MEXITIL) 150 MG capsule; Take 2 capsules by mouth 2 (Two) Times a Day.  Dispense: 360 capsule; Refill: 3    3. Other headache syndrome  -      traZODone (DESYREL) 50 MG tablet; Take 2 tablets by mouth Every Night.  Dispense: 180 tablet; Refill: 3    4. Closed odontoid fracture with type II morphology, anterior displacement, and routine healing, subsequent encounter  -     traZODone (DESYREL) 50 MG tablet; Take 2 tablets by mouth Every Night.  Dispense: 180 tablet; Refill: 3                     Dictated utilizing Dragon dictation.

## 2021-09-29 ENCOUNTER — TELEPHONE (OUTPATIENT)
Dept: UROLOGY | Facility: CLINIC | Age: 71
End: 2021-09-29

## 2021-09-29 NOTE — TELEPHONE ENCOUNTER
----- Message from Kelli Cloud sent at 9/29/2021  7:52 AM CDT -----  Regarding: appointment in [place]  We received a PSA for this patient. Please schedule him a follow up in [place]. Thanks.

## 2022-01-01 ENCOUNTER — TELEPHONE (OUTPATIENT)
Dept: INTERNAL MEDICINE | Facility: CLINIC | Age: 72
End: 2022-01-01

## 2022-01-01 DIAGNOSIS — E44.0 MODERATE PROTEIN-CALORIE MALNUTRITION: Primary | ICD-10-CM

## 2022-02-08 ENCOUNTER — APPOINTMENT (OUTPATIENT)
Dept: CT IMAGING | Facility: HOSPITAL | Age: 72
End: 2022-02-08

## 2022-02-08 ENCOUNTER — APPOINTMENT (OUTPATIENT)
Dept: GENERAL RADIOLOGY | Facility: HOSPITAL | Age: 72
End: 2022-02-08

## 2022-02-08 ENCOUNTER — HOSPITAL ENCOUNTER (INPATIENT)
Facility: HOSPITAL | Age: 72
LOS: 16 days | Discharge: HOME HEALTH | End: 2022-02-24
Attending: EMERGENCY MEDICINE | Admitting: INTERNAL MEDICINE

## 2022-02-08 DIAGNOSIS — D62 ACUTE POSTHEMORRHAGIC ANEMIA: Primary | ICD-10-CM

## 2022-02-08 DIAGNOSIS — R13.10 DYSPHAGIA, UNSPECIFIED TYPE: ICD-10-CM

## 2022-02-08 DIAGNOSIS — R19.5 HEME POSITIVE STOOL: ICD-10-CM

## 2022-02-08 DIAGNOSIS — E87.6 HYPOKALEMIA: ICD-10-CM

## 2022-02-08 DIAGNOSIS — R63.4 WEIGHT LOSS: ICD-10-CM

## 2022-02-08 DIAGNOSIS — U07.1 COVID-19: ICD-10-CM

## 2022-02-08 DIAGNOSIS — R29.898 MUSCULAR DECONDITIONING: ICD-10-CM

## 2022-02-08 DIAGNOSIS — Z74.09 IMPAIRED MOBILITY: ICD-10-CM

## 2022-02-08 DIAGNOSIS — Z78.9 DECREASED ACTIVITIES OF DAILY LIVING (ADL): ICD-10-CM

## 2022-02-08 DIAGNOSIS — D50.9 IRON DEFICIENCY ANEMIA, UNSPECIFIED IRON DEFICIENCY ANEMIA TYPE: ICD-10-CM

## 2022-02-08 DIAGNOSIS — E44.0 MODERATE PROTEIN-CALORIE MALNUTRITION: ICD-10-CM

## 2022-02-08 DIAGNOSIS — R62.7 FAILURE TO THRIVE IN ADULT: ICD-10-CM

## 2022-02-08 PROBLEM — Z79.891 CHRONIC PRESCRIPTION OPIATE USE: Status: ACTIVE | Noted: 2022-02-08

## 2022-02-08 PROBLEM — E46 MALNUTRITION (HCC): Status: ACTIVE | Noted: 2022-02-08

## 2022-02-08 PROBLEM — K59.03 DRUG-INDUCED CONSTIPATION: Status: ACTIVE | Noted: 2022-02-08

## 2022-02-08 LAB
ALBUMIN SERPL-MCNC: 3.6 G/DL (ref 3.5–5.2)
ALBUMIN/GLOB SERPL: 1.2 G/DL
ALP SERPL-CCNC: 108 U/L (ref 39–117)
ALT SERPL W P-5'-P-CCNC: 15 U/L (ref 1–41)
ANION GAP SERPL CALCULATED.3IONS-SCNC: 12 MMOL/L (ref 5–15)
ARTERIAL PATENCY WRIST A: NORMAL
AST SERPL-CCNC: 34 U/L (ref 1–40)
ATMOSPHERIC PRESS: 755 MMHG
BACTERIA UR QL AUTO: ABNORMAL /HPF
BASE EXCESS BLDA CALC-SCNC: 1.1 MMOL/L (ref 0–2)
BASOPHILS # BLD AUTO: 0 10*3/MM3 (ref 0–0.2)
BASOPHILS NFR BLD AUTO: 0 % (ref 0–1.5)
BDY SITE: NORMAL
BILIRUB SERPL-MCNC: 0.5 MG/DL (ref 0–1.2)
BILIRUB UR QL STRIP: NEGATIVE
BODY TEMPERATURE: 37 C
BUN SERPL-MCNC: 31 MG/DL (ref 8–23)
BUN/CREAT SERPL: 24.4 (ref 7–25)
CALCIUM SPEC-SCNC: 8.5 MG/DL (ref 8.6–10.5)
CHLORIDE SERPL-SCNC: 99 MMOL/L (ref 98–107)
CK SERPL-CCNC: 204 U/L (ref 20–200)
CLARITY UR: CLEAR
CO2 SERPL-SCNC: 27 MMOL/L (ref 22–29)
COLOR UR: ABNORMAL
CREAT SERPL-MCNC: 1.27 MG/DL (ref 0.76–1.27)
CRP SERPL-MCNC: 5.81 MG/DL (ref 0–0.5)
D DIMER PPP FEU-MCNC: 0.53 MG/L (FEU) (ref 0–0.5)
D-LACTATE SERPL-SCNC: 1.9 MMOL/L (ref 0.5–2)
DEPRECATED RDW RBC AUTO: 46.7 FL (ref 37–54)
EOSINOPHIL # BLD AUTO: 0.04 10*3/MM3 (ref 0–0.4)
EOSINOPHIL NFR BLD AUTO: 0.7 % (ref 0.3–6.2)
ERYTHROCYTE [DISTWIDTH] IN BLOOD BY AUTOMATED COUNT: 14.4 % (ref 12.3–15.4)
GFR SERPL CREATININE-BSD FRML MDRD: 56 ML/MIN/1.73
GLOBULIN UR ELPH-MCNC: 2.9 GM/DL
GLUCOSE SERPL-MCNC: 98 MG/DL (ref 65–99)
GLUCOSE UR STRIP-MCNC: NEGATIVE MG/DL
HCO3 BLDA-SCNC: 25.9 MMOL/L (ref 20–26)
HCT VFR BLD AUTO: 35.6 % (ref 37.5–51)
HGB BLD-MCNC: 12.2 G/DL (ref 13–17.7)
HGB UR QL STRIP.AUTO: ABNORMAL
HYALINE CASTS UR QL AUTO: ABNORMAL /LPF
IMM GRANULOCYTES # BLD AUTO: 0.03 10*3/MM3 (ref 0–0.05)
IMM GRANULOCYTES NFR BLD AUTO: 0.5 % (ref 0–0.5)
INR PPP: 1.78 (ref 0.91–1.09)
KETONES UR QL STRIP: NEGATIVE
LEUKOCYTE ESTERASE UR QL STRIP.AUTO: ABNORMAL
LYMPHOCYTES # BLD AUTO: 0.61 10*3/MM3 (ref 0.7–3.1)
LYMPHOCYTES NFR BLD AUTO: 11.1 % (ref 19.6–45.3)
Lab: NORMAL
MAGNESIUM SERPL-MCNC: 2.2 MG/DL (ref 1.6–2.4)
MCH RBC QN AUTO: 30.8 PG (ref 26.6–33)
MCHC RBC AUTO-ENTMCNC: 34.3 G/DL (ref 31.5–35.7)
MCV RBC AUTO: 89.9 FL (ref 79–97)
MODALITY: NORMAL
MONOCYTES # BLD AUTO: 0.25 10*3/MM3 (ref 0.1–0.9)
MONOCYTES NFR BLD AUTO: 4.6 % (ref 5–12)
NEUTROPHILS NFR BLD AUTO: 4.55 10*3/MM3 (ref 1.7–7)
NEUTROPHILS NFR BLD AUTO: 83.1 % (ref 42.7–76)
NITRITE UR QL STRIP: NEGATIVE
NRBC BLD AUTO-RTO: 0 /100 WBC (ref 0–0.2)
PCO2 BLDA: 40.8 MM HG (ref 35–45)
PCO2 TEMP ADJ BLD: 40.8 MM HG (ref 35–45)
PH BLDA: 7.41 PH UNITS (ref 7.35–7.45)
PH UR STRIP.AUTO: 6.5 [PH] (ref 5–8)
PH, TEMP CORRECTED: 7.41 PH UNITS (ref 7.35–7.45)
PLATELET # BLD AUTO: 189 10*3/MM3 (ref 140–450)
PMV BLD AUTO: 8.8 FL (ref 6–12)
PO2 BLDA: 92.6 MM HG (ref 83–108)
PO2 TEMP ADJ BLD: 92.6 MM HG (ref 83–108)
POTASSIUM SERPL-SCNC: 2.3 MMOL/L (ref 3.5–5.2)
PROCALCITONIN SERPL-MCNC: 0.13 NG/ML (ref 0–0.25)
PROT SERPL-MCNC: 6.5 G/DL (ref 6–8.5)
PROT UR QL STRIP: ABNORMAL
PROTHROMBIN TIME: 20 SECONDS (ref 11.9–14.6)
RBC # BLD AUTO: 3.96 10*6/MM3 (ref 4.14–5.8)
RBC # UR STRIP: ABNORMAL /HPF
REF LAB TEST METHOD: ABNORMAL
SAO2 % BLDCOA: 98.1 % (ref 94–99)
SARS-COV-2 RNA PNL SPEC NAA+PROBE: DETECTED
SODIUM SERPL-SCNC: 138 MMOL/L (ref 136–145)
SP GR UR STRIP: 1.02 (ref 1–1.03)
SQUAMOUS #/AREA URNS HPF: ABNORMAL /HPF
TSH SERPL DL<=0.05 MIU/L-ACNC: 0.71 UIU/ML (ref 0.27–4.2)
UROBILINOGEN UR QL STRIP: ABNORMAL
VENTILATOR MODE: NORMAL
WBC # UR STRIP: ABNORMAL /HPF
WBC NRBC COR # BLD: 5.48 10*3/MM3 (ref 3.4–10.8)

## 2022-02-08 PROCEDURE — 74177 CT ABD & PELVIS W/CONTRAST: CPT

## 2022-02-08 PROCEDURE — 94799 UNLISTED PULMONARY SVC/PX: CPT

## 2022-02-08 PROCEDURE — 84145 PROCALCITONIN (PCT): CPT | Performed by: EMERGENCY MEDICINE

## 2022-02-08 PROCEDURE — 85025 COMPLETE CBC W/AUTO DIFF WBC: CPT | Performed by: EMERGENCY MEDICINE

## 2022-02-08 PROCEDURE — 85379 FIBRIN DEGRADATION QUANT: CPT | Performed by: INTERNAL MEDICINE

## 2022-02-08 PROCEDURE — 87040 BLOOD CULTURE FOR BACTERIA: CPT | Performed by: EMERGENCY MEDICINE

## 2022-02-08 PROCEDURE — 86140 C-REACTIVE PROTEIN: CPT | Performed by: EMERGENCY MEDICINE

## 2022-02-08 PROCEDURE — 99285 EMERGENCY DEPT VISIT HI MDM: CPT

## 2022-02-08 PROCEDURE — 71045 X-RAY EXAM CHEST 1 VIEW: CPT

## 2022-02-08 PROCEDURE — 71260 CT THORAX DX C+: CPT

## 2022-02-08 PROCEDURE — 84443 ASSAY THYROID STIM HORMONE: CPT | Performed by: EMERGENCY MEDICINE

## 2022-02-08 PROCEDURE — 93005 ELECTROCARDIOGRAM TRACING: CPT | Performed by: EMERGENCY MEDICINE

## 2022-02-08 PROCEDURE — 87086 URINE CULTURE/COLONY COUNT: CPT | Performed by: EMERGENCY MEDICINE

## 2022-02-08 PROCEDURE — 82803 BLOOD GASES ANY COMBINATION: CPT

## 2022-02-08 PROCEDURE — 25010000002 SODIUM CHLORIDE 0.9 % WITH KCL 20 MEQ 20-0.9 MEQ/L-% SOLUTION: Performed by: INTERNAL MEDICINE

## 2022-02-08 PROCEDURE — 80053 COMPREHEN METABOLIC PANEL: CPT | Performed by: EMERGENCY MEDICINE

## 2022-02-08 PROCEDURE — 83735 ASSAY OF MAGNESIUM: CPT | Performed by: EMERGENCY MEDICINE

## 2022-02-08 PROCEDURE — 25010000002 METHYLNALTREXONE 12 MG/0.6ML SOLUTION: Performed by: INTERNAL MEDICINE

## 2022-02-08 PROCEDURE — 25010000002 ENOXAPARIN PER 10 MG: Performed by: INTERNAL MEDICINE

## 2022-02-08 PROCEDURE — 25010000002 POTASSIUM CHLORIDE PER 2 MEQ: Performed by: EMERGENCY MEDICINE

## 2022-02-08 PROCEDURE — 82550 ASSAY OF CK (CPK): CPT | Performed by: INTERNAL MEDICINE

## 2022-02-08 PROCEDURE — 25010000002 IOPAMIDOL 61 % SOLUTION: Performed by: EMERGENCY MEDICINE

## 2022-02-08 PROCEDURE — 81001 URINALYSIS AUTO W/SCOPE: CPT | Performed by: EMERGENCY MEDICINE

## 2022-02-08 PROCEDURE — 93010 ELECTROCARDIOGRAM REPORT: CPT | Performed by: INTERNAL MEDICINE

## 2022-02-08 PROCEDURE — 36600 WITHDRAWAL OF ARTERIAL BLOOD: CPT

## 2022-02-08 PROCEDURE — 85610 PROTHROMBIN TIME: CPT | Performed by: EMERGENCY MEDICINE

## 2022-02-08 PROCEDURE — 25010000002 MAGNESIUM SULFATE 2 GM/50ML SOLUTION: Performed by: INTERNAL MEDICINE

## 2022-02-08 PROCEDURE — 83605 ASSAY OF LACTIC ACID: CPT | Performed by: EMERGENCY MEDICINE

## 2022-02-08 PROCEDURE — 87635 SARS-COV-2 COVID-19 AMP PRB: CPT | Performed by: EMERGENCY MEDICINE

## 2022-02-08 RX ORDER — BENZONATATE 100 MG/1
200 CAPSULE ORAL 3 TIMES DAILY PRN
Status: DISCONTINUED | OUTPATIENT
Start: 2022-02-08 | End: 2022-02-24 | Stop reason: HOSPADM

## 2022-02-08 RX ORDER — POTASSIUM CHLORIDE 14.9 MG/ML
20 INJECTION INTRAVENOUS ONCE
Status: COMPLETED | OUTPATIENT
Start: 2022-02-08 | End: 2022-02-08

## 2022-02-08 RX ORDER — MAGNESIUM SULFATE HEPTAHYDRATE 40 MG/ML
2 INJECTION, SOLUTION INTRAVENOUS ONCE
Status: COMPLETED | OUTPATIENT
Start: 2022-02-08 | End: 2022-02-08

## 2022-02-08 RX ORDER — OXYCODONE HYDROCHLORIDE 5 MG/1
10 TABLET ORAL EVERY 4 HOURS PRN
Status: DISCONTINUED | OUTPATIENT
Start: 2022-02-08 | End: 2022-02-08

## 2022-02-08 RX ORDER — ACETAMINOPHEN 325 MG/1
650 TABLET ORAL EVERY 4 HOURS PRN
Status: DISCONTINUED | OUTPATIENT
Start: 2022-02-08 | End: 2022-02-24 | Stop reason: HOSPADM

## 2022-02-08 RX ORDER — TRAZODONE HYDROCHLORIDE 50 MG/1
50 TABLET ORAL NIGHTLY
Status: DISCONTINUED | OUTPATIENT
Start: 2022-02-08 | End: 2022-02-24 | Stop reason: HOSPADM

## 2022-02-08 RX ORDER — ONDANSETRON 4 MG/1
4 TABLET, FILM COATED ORAL EVERY 6 HOURS PRN
Status: DISCONTINUED | OUTPATIENT
Start: 2022-02-08 | End: 2022-02-24 | Stop reason: HOSPADM

## 2022-02-08 RX ORDER — SODIUM CHLORIDE 0.9 % (FLUSH) 0.9 %
10 SYRINGE (ML) INJECTION AS NEEDED
Status: DISCONTINUED | OUTPATIENT
Start: 2022-02-08 | End: 2022-02-24 | Stop reason: HOSPADM

## 2022-02-08 RX ORDER — ATORVASTATIN CALCIUM 10 MG/1
20 TABLET, FILM COATED ORAL NIGHTLY
Status: DISCONTINUED | OUTPATIENT
Start: 2022-02-08 | End: 2022-02-24 | Stop reason: HOSPADM

## 2022-02-08 RX ORDER — AMLODIPINE BESYLATE 5 MG/1
5 TABLET ORAL DAILY
Status: DISCONTINUED | OUTPATIENT
Start: 2022-02-08 | End: 2022-02-19

## 2022-02-08 RX ORDER — OXYCODONE HYDROCHLORIDE 5 MG/1
10 TABLET ORAL EVERY 4 HOURS PRN
Status: DISCONTINUED | OUTPATIENT
Start: 2022-02-08 | End: 2022-02-13

## 2022-02-08 RX ORDER — POTASSIUM CHLORIDE 750 MG/1
40 CAPSULE, EXTENDED RELEASE ORAL ONCE
Status: COMPLETED | OUTPATIENT
Start: 2022-02-08 | End: 2022-02-08

## 2022-02-08 RX ORDER — ONDANSETRON 2 MG/ML
4 INJECTION INTRAMUSCULAR; INTRAVENOUS EVERY 6 HOURS PRN
Status: DISCONTINUED | OUTPATIENT
Start: 2022-02-08 | End: 2022-02-24 | Stop reason: HOSPADM

## 2022-02-08 RX ORDER — ASPIRIN 81 MG/1
81 TABLET ORAL DAILY
Status: DISCONTINUED | OUTPATIENT
Start: 2022-02-08 | End: 2022-02-20

## 2022-02-08 RX ORDER — SODIUM CHLORIDE AND POTASSIUM CHLORIDE 150; 900 MG/100ML; MG/100ML
75 INJECTION, SOLUTION INTRAVENOUS CONTINUOUS
Status: DISPENSED | OUTPATIENT
Start: 2022-02-08 | End: 2022-02-09

## 2022-02-08 RX ORDER — NYSTATIN 100000 [USP'U]/G
POWDER TOPICAL EVERY 12 HOURS SCHEDULED
Status: DISCONTINUED | OUTPATIENT
Start: 2022-02-08 | End: 2022-02-24 | Stop reason: HOSPADM

## 2022-02-08 RX ORDER — ZINC SULFATE 50(220)MG
220 CAPSULE ORAL DAILY
Status: DISCONTINUED | OUTPATIENT
Start: 2022-02-08 | End: 2022-02-22

## 2022-02-08 RX ORDER — SODIUM CHLORIDE 0.9 % (FLUSH) 0.9 %
10 SYRINGE (ML) INJECTION EVERY 12 HOURS SCHEDULED
Status: DISCONTINUED | OUTPATIENT
Start: 2022-02-08 | End: 2022-02-24 | Stop reason: HOSPADM

## 2022-02-08 RX ORDER — DEXTROMETHORPHAN POLISTIREX 30 MG/5ML
60 SUSPENSION ORAL EVERY 12 HOURS PRN
Status: DISCONTINUED | OUTPATIENT
Start: 2022-02-08 | End: 2022-02-24 | Stop reason: HOSPADM

## 2022-02-08 RX ORDER — MEXILETINE HYDROCHLORIDE 150 MG/1
300 CAPSULE ORAL 2 TIMES DAILY
Status: DISCONTINUED | OUTPATIENT
Start: 2022-02-08 | End: 2022-02-24 | Stop reason: HOSPADM

## 2022-02-08 RX ORDER — POLYETHYLENE GLYCOL 3350 17 G/17G
17 POWDER, FOR SOLUTION ORAL DAILY
Status: DISCONTINUED | OUTPATIENT
Start: 2022-02-08 | End: 2022-02-24 | Stop reason: HOSPADM

## 2022-02-08 RX ORDER — ASCORBIC ACID 500 MG
500 TABLET ORAL DAILY
Status: DISCONTINUED | OUTPATIENT
Start: 2022-02-08 | End: 2022-02-16

## 2022-02-08 RX ORDER — AMOXICILLIN 250 MG
1 CAPSULE ORAL 2 TIMES DAILY
Status: DISCONTINUED | OUTPATIENT
Start: 2022-02-08 | End: 2022-02-24 | Stop reason: HOSPADM

## 2022-02-08 RX ADMIN — OXYCODONE HYDROCHLORIDE 10 MG: 5 TABLET ORAL at 13:55

## 2022-02-08 RX ADMIN — OXYCODONE HYDROCHLORIDE 10 MG: 5 TABLET ORAL at 11:14

## 2022-02-08 RX ADMIN — ATORVASTATIN CALCIUM 20 MG: 10 TABLET, FILM COATED ORAL at 20:54

## 2022-02-08 RX ADMIN — METHYLNALTREXONE BROMIDE 4 MG: 12 INJECTION, SOLUTION SUBCUTANEOUS at 17:21

## 2022-02-08 RX ADMIN — ZINC SULFATE 220 MG (50 MG) CAPSULE 220 MG: CAPSULE at 17:21

## 2022-02-08 RX ADMIN — DOCUSATE SODIUM 50 MG AND SENNOSIDES 8.6 MG 1 TABLET: 8.6; 5 TABLET, FILM COATED ORAL at 20:54

## 2022-02-08 RX ADMIN — POLYETHYLENE GLYCOL 3350 17 G: 17 POWDER, FOR SOLUTION ORAL at 17:20

## 2022-02-08 RX ADMIN — ENOXAPARIN SODIUM 40 MG: 40 INJECTION SUBCUTANEOUS at 17:25

## 2022-02-08 RX ADMIN — SODIUM CHLORIDE, PRESERVATIVE FREE 10 ML: 5 INJECTION INTRAVENOUS at 20:55

## 2022-02-08 RX ADMIN — IOPAMIDOL 100 ML: 612 INJECTION, SOLUTION INTRAVENOUS at 11:46

## 2022-02-08 RX ADMIN — TRAZODONE HYDROCHLORIDE 50 MG: 50 TABLET ORAL at 20:55

## 2022-02-08 RX ADMIN — MEXILETINE HYDROCHLORIDE 300 MG: 150 CAPSULE ORAL at 20:54

## 2022-02-08 RX ADMIN — OXYCODONE HYDROCHLORIDE AND ACETAMINOPHEN 500 MG: 500 TABLET ORAL at 17:23

## 2022-02-08 RX ADMIN — MAGNESIUM SULFATE HEPTAHYDRATE 2 G: 2 INJECTION, SOLUTION INTRAVENOUS at 17:26

## 2022-02-08 RX ADMIN — OXYCODONE HYDROCHLORIDE 10 MG: 5 TABLET ORAL at 20:54

## 2022-02-08 RX ADMIN — POTASSIUM CHLORIDE 40 MEQ: 10 CAPSULE, COATED, EXTENDED RELEASE ORAL at 13:56

## 2022-02-08 RX ADMIN — SODIUM CHLORIDE, POTASSIUM CHLORIDE, SODIUM LACTATE AND CALCIUM CHLORIDE 500 ML: 600; 310; 30; 20 INJECTION, SOLUTION INTRAVENOUS at 10:46

## 2022-02-08 RX ADMIN — POTASSIUM CHLORIDE 20 MEQ: 14.9 INJECTION, SOLUTION INTRAVENOUS at 12:09

## 2022-02-08 RX ADMIN — AMLODIPINE BESYLATE 5 MG: 5 TABLET ORAL at 17:22

## 2022-02-08 RX ADMIN — POTASSIUM CHLORIDE AND SODIUM CHLORIDE 75 ML/HR: 900; 150 INJECTION, SOLUTION INTRAVENOUS at 17:23

## 2022-02-08 RX ADMIN — NYSTATIN: 100000 POWDER TOPICAL at 23:56

## 2022-02-08 RX ADMIN — POTASSIUM CHLORIDE 40 MEQ: 10 CAPSULE, COATED, EXTENDED RELEASE ORAL at 17:21

## 2022-02-08 RX ADMIN — THIAMINE HCL TAB 100 MG 100 MG: 100 TAB at 17:24

## 2022-02-08 RX ADMIN — ASPIRIN 81 MG: 81 TABLET ORAL at 17:27

## 2022-02-09 ENCOUNTER — APPOINTMENT (OUTPATIENT)
Dept: CT IMAGING | Facility: HOSPITAL | Age: 72
End: 2022-02-09

## 2022-02-09 LAB
ALBUMIN SERPL-MCNC: 2.5 G/DL (ref 3.5–5.2)
ALBUMIN/GLOB SERPL: 1.3 G/DL
ALP SERPL-CCNC: 93 U/L (ref 39–117)
ALT SERPL W P-5'-P-CCNC: 12 U/L (ref 1–41)
ANION GAP SERPL CALCULATED.3IONS-SCNC: 10 MMOL/L (ref 5–15)
ANISOCYTOSIS BLD QL: ABNORMAL
ARTERIAL PATENCY WRIST A: POSITIVE
AST SERPL-CCNC: 28 U/L (ref 1–40)
ATMOSPHERIC PRESS: 748 MMHG
BACTERIA SPEC AEROBE CULT: NORMAL
BASE EXCESS BLDA CALC-SCNC: 0.2 MMOL/L (ref 0–2)
BDY SITE: ABNORMAL
BILIRUB SERPL-MCNC: 1.6 MG/DL (ref 0–1.2)
BODY TEMPERATURE: 37 C
BUN SERPL-MCNC: 21 MG/DL (ref 8–23)
BUN/CREAT SERPL: 16.7 (ref 7–25)
CALCIUM SPEC-SCNC: 7.4 MG/DL (ref 8.6–10.5)
CHLORIDE SERPL-SCNC: 105 MMOL/L (ref 98–107)
CO2 SERPL-SCNC: 21 MMOL/L (ref 22–29)
CREAT SERPL-MCNC: 1.26 MG/DL (ref 0.76–1.27)
CRP SERPL-MCNC: 7.1 MG/DL (ref 0–0.5)
D DIMER PPP FEU-MCNC: 0.84 MG/L (FEU) (ref 0–0.5)
DEPRECATED RDW RBC AUTO: 46.5 FL (ref 37–54)
EOSINOPHIL # BLD MANUAL: 0.02 10*3/MM3 (ref 0–0.4)
EOSINOPHIL NFR BLD MANUAL: 1.1 % (ref 0.3–6.2)
ERYTHROCYTE [DISTWIDTH] IN BLOOD BY AUTOMATED COUNT: 14.4 % (ref 12.3–15.4)
FERRITIN SERPL-MCNC: 464.1 NG/ML (ref 30–400)
GFR SERPL CREATININE-BSD FRML MDRD: 56 ML/MIN/1.73
GLOBULIN UR ELPH-MCNC: 2 GM/DL
GLUCOSE SERPL-MCNC: 103 MG/DL (ref 65–99)
HCO3 BLDA-SCNC: 22.7 MMOL/L (ref 20–26)
HCT VFR BLD AUTO: 27.9 % (ref 37.5–51)
HGB BLD-MCNC: 10 G/DL (ref 13–17.7)
INHALED O2 CONCENTRATION: 21 %
LYMPHOCYTES # BLD MANUAL: 0.14 10*3/MM3 (ref 0.7–3.1)
Lab: ABNORMAL
MCH RBC QN AUTO: 32.3 PG (ref 26.6–33)
MCHC RBC AUTO-ENTMCNC: 35.8 G/DL (ref 31.5–35.7)
MCV RBC AUTO: 90 FL (ref 79–97)
MODALITY: ABNORMAL
NEUTROPHILS # BLD AUTO: 1.25 10*3/MM3 (ref 1.7–7)
NEUTROPHILS NFR BLD MANUAL: 87.1 % (ref 42.7–76)
NEUTS BAND NFR BLD MANUAL: 2.2 % (ref 0–5)
NEUTS VAC BLD QL SMEAR: ABNORMAL
NRBC SPEC MANUAL: 1.1 /100 WBC (ref 0–0.2)
PCO2 BLDA: 28.4 MM HG (ref 35–45)
PCO2 TEMP ADJ BLD: 28.4 MM HG (ref 35–45)
PH BLDA: 7.51 PH UNITS (ref 7.35–7.45)
PH, TEMP CORRECTED: 7.51 PH UNITS (ref 7.35–7.45)
PLAT MORPH BLD: NORMAL
PLATELET # BLD AUTO: 162 10*3/MM3 (ref 140–450)
PMV BLD AUTO: 9.4 FL (ref 6–12)
PO2 BLDA: 58.7 MM HG (ref 83–108)
PO2 TEMP ADJ BLD: 58.7 MM HG (ref 83–108)
POIKILOCYTOSIS BLD QL SMEAR: ABNORMAL
POTASSIUM SERPL-SCNC: 2.9 MMOL/L (ref 3.5–5.2)
PROCALCITONIN SERPL-MCNC: 0.37 NG/ML (ref 0–0.25)
PROT SERPL-MCNC: 4.5 G/DL (ref 6–8.5)
QT INTERVAL: 582 MS
QTC INTERVAL: 663 MS
RBC # BLD AUTO: 3.1 10*6/MM3 (ref 4.14–5.8)
SAO2 % BLDCOA: 94.3 % (ref 94–99)
SODIUM SERPL-SCNC: 136 MMOL/L (ref 136–145)
TROPONIN T SERPL-MCNC: 0.06 NG/ML (ref 0–0.03)
VARIANT LYMPHS NFR BLD MANUAL: 2.2 % (ref 0–5)
VARIANT LYMPHS NFR BLD MANUAL: 7.5 % (ref 19.6–45.3)
VENTILATOR MODE: ABNORMAL
WBC NRBC COR # BLD: 1.4 10*3/MM3 (ref 3.4–10.8)

## 2022-02-09 PROCEDURE — 25010000002 MAGNESIUM SULFATE 2 GM/50ML SOLUTION: Performed by: INTERNAL MEDICINE

## 2022-02-09 PROCEDURE — 84484 ASSAY OF TROPONIN QUANT: CPT | Performed by: INTERNAL MEDICINE

## 2022-02-09 PROCEDURE — 80053 COMPREHEN METABOLIC PANEL: CPT | Performed by: INTERNAL MEDICINE

## 2022-02-09 PROCEDURE — 25010000002 ENOXAPARIN PER 10 MG: Performed by: INTERNAL MEDICINE

## 2022-02-09 PROCEDURE — 82728 ASSAY OF FERRITIN: CPT | Performed by: INTERNAL MEDICINE

## 2022-02-09 PROCEDURE — 25010000002 KETOROLAC TROMETHAMINE PER 15 MG: Performed by: INTERNAL MEDICINE

## 2022-02-09 PROCEDURE — 93010 ELECTROCARDIOGRAM REPORT: CPT | Performed by: INTERNAL MEDICINE

## 2022-02-09 PROCEDURE — 70450 CT HEAD/BRAIN W/O DYE: CPT

## 2022-02-09 PROCEDURE — 85379 FIBRIN DEGRADATION QUANT: CPT | Performed by: INTERNAL MEDICINE

## 2022-02-09 PROCEDURE — 25010000002 SODIUM CHLORIDE 0.9 % WITH KCL 20 MEQ 20-0.9 MEQ/L-% SOLUTION: Performed by: INTERNAL MEDICINE

## 2022-02-09 PROCEDURE — 86140 C-REACTIVE PROTEIN: CPT | Performed by: INTERNAL MEDICINE

## 2022-02-09 PROCEDURE — 82803 BLOOD GASES ANY COMBINATION: CPT

## 2022-02-09 PROCEDURE — 93005 ELECTROCARDIOGRAM TRACING: CPT | Performed by: INTERNAL MEDICINE

## 2022-02-09 PROCEDURE — 97166 OT EVAL MOD COMPLEX 45 MIN: CPT

## 2022-02-09 PROCEDURE — 97163 PT EVAL HIGH COMPLEX 45 MIN: CPT

## 2022-02-09 PROCEDURE — 85007 BL SMEAR W/DIFF WBC COUNT: CPT | Performed by: INTERNAL MEDICINE

## 2022-02-09 PROCEDURE — 85025 COMPLETE CBC W/AUTO DIFF WBC: CPT | Performed by: INTERNAL MEDICINE

## 2022-02-09 PROCEDURE — 36600 WITHDRAWAL OF ARTERIAL BLOOD: CPT

## 2022-02-09 PROCEDURE — 84145 PROCALCITONIN (PCT): CPT | Performed by: INTERNAL MEDICINE

## 2022-02-09 RX ORDER — MAGNESIUM SULFATE HEPTAHYDRATE 40 MG/ML
2 INJECTION, SOLUTION INTRAVENOUS ONCE
Status: COMPLETED | OUTPATIENT
Start: 2022-02-09 | End: 2022-02-09

## 2022-02-09 RX ORDER — SODIUM CHLORIDE AND POTASSIUM CHLORIDE 150; 900 MG/100ML; MG/100ML
75 INJECTION, SOLUTION INTRAVENOUS CONTINUOUS
Status: DISCONTINUED | OUTPATIENT
Start: 2022-02-09 | End: 2022-02-10

## 2022-02-09 RX ORDER — POTASSIUM CHLORIDE 750 MG/1
40 CAPSULE, EXTENDED RELEASE ORAL
Status: COMPLETED | OUTPATIENT
Start: 2022-02-09 | End: 2022-02-09

## 2022-02-09 RX ORDER — KETOROLAC TROMETHAMINE 15 MG/ML
15 INJECTION, SOLUTION INTRAMUSCULAR; INTRAVENOUS ONCE
Status: COMPLETED | OUTPATIENT
Start: 2022-02-09 | End: 2022-02-09

## 2022-02-09 RX ORDER — ACETAMINOPHEN 500 MG
1000 TABLET ORAL ONCE
Status: COMPLETED | OUTPATIENT
Start: 2022-02-09 | End: 2022-02-09

## 2022-02-09 RX ADMIN — DOCUSATE SODIUM 50 MG AND SENNOSIDES 8.6 MG 1 TABLET: 8.6; 5 TABLET, FILM COATED ORAL at 20:34

## 2022-02-09 RX ADMIN — ACETAMINOPHEN 1000 MG: 500 TABLET ORAL at 09:24

## 2022-02-09 RX ADMIN — ASPIRIN 81 MG: 81 TABLET ORAL at 09:24

## 2022-02-09 RX ADMIN — OXYCODONE HYDROCHLORIDE 10 MG: 5 TABLET ORAL at 16:37

## 2022-02-09 RX ADMIN — MEXILETINE HYDROCHLORIDE 300 MG: 150 CAPSULE ORAL at 20:33

## 2022-02-09 RX ADMIN — OXYCODONE HYDROCHLORIDE 10 MG: 5 TABLET ORAL at 05:17

## 2022-02-09 RX ADMIN — THIAMINE HCL TAB 100 MG 100 MG: 100 TAB at 09:24

## 2022-02-09 RX ADMIN — ZINC SULFATE 220 MG (50 MG) CAPSULE 220 MG: CAPSULE at 09:24

## 2022-02-09 RX ADMIN — SODIUM CHLORIDE, PRESERVATIVE FREE 10 ML: 5 INJECTION INTRAVENOUS at 20:34

## 2022-02-09 RX ADMIN — OXYCODONE HYDROCHLORIDE AND ACETAMINOPHEN 500 MG: 500 TABLET ORAL at 09:24

## 2022-02-09 RX ADMIN — POTASSIUM CHLORIDE AND SODIUM CHLORIDE 75 ML/HR: 900; 150 INJECTION, SOLUTION INTRAVENOUS at 23:42

## 2022-02-09 RX ADMIN — NYSTATIN: 100000 POWDER TOPICAL at 20:35

## 2022-02-09 RX ADMIN — POTASSIUM CHLORIDE 40 MEQ: 10 CAPSULE, COATED, EXTENDED RELEASE ORAL at 18:13

## 2022-02-09 RX ADMIN — POTASSIUM CHLORIDE 40 MEQ: 10 CAPSULE, COATED, EXTENDED RELEASE ORAL at 23:42

## 2022-02-09 RX ADMIN — SODIUM CHLORIDE, PRESERVATIVE FREE 10 ML: 5 INJECTION INTRAVENOUS at 09:25

## 2022-02-09 RX ADMIN — OXYCODONE HYDROCHLORIDE 10 MG: 5 TABLET ORAL at 12:21

## 2022-02-09 RX ADMIN — MAGNESIUM SULFATE HEPTAHYDRATE 2 G: 2 INJECTION, SOLUTION INTRAVENOUS at 18:13

## 2022-02-09 RX ADMIN — TRAZODONE HYDROCHLORIDE 50 MG: 50 TABLET ORAL at 20:33

## 2022-02-09 RX ADMIN — OXYCODONE HYDROCHLORIDE 10 MG: 5 TABLET ORAL at 01:15

## 2022-02-09 RX ADMIN — ENOXAPARIN SODIUM 30 MG: 30 INJECTION SUBCUTANEOUS at 16:37

## 2022-02-09 RX ADMIN — DOCUSATE SODIUM 50 MG AND SENNOSIDES 8.6 MG 1 TABLET: 8.6; 5 TABLET, FILM COATED ORAL at 09:24

## 2022-02-09 RX ADMIN — POTASSIUM CHLORIDE 40 MEQ: 10 CAPSULE, COATED, EXTENDED RELEASE ORAL at 20:33

## 2022-02-09 RX ADMIN — NYSTATIN: 100000 POWDER TOPICAL at 09:26

## 2022-02-09 RX ADMIN — KETOROLAC TROMETHAMINE 15 MG: 15 INJECTION, SOLUTION INTRAMUSCULAR; INTRAVENOUS at 03:50

## 2022-02-09 RX ADMIN — OXYCODONE HYDROCHLORIDE 10 MG: 5 TABLET ORAL at 20:33

## 2022-02-09 RX ADMIN — POLYETHYLENE GLYCOL 3350 17 G: 17 POWDER, FOR SOLUTION ORAL at 09:24

## 2022-02-09 RX ADMIN — AMLODIPINE BESYLATE 5 MG: 5 TABLET ORAL at 09:24

## 2022-02-09 RX ADMIN — MEXILETINE HYDROCHLORIDE 300 MG: 150 CAPSULE ORAL at 09:24

## 2022-02-09 RX ADMIN — ATORVASTATIN CALCIUM 20 MG: 10 TABLET, FILM COATED ORAL at 20:33

## 2022-02-10 LAB
ALBUMIN SERPL-MCNC: 2.5 G/DL (ref 3.5–5.2)
ALBUMIN/GLOB SERPL: 1 G/DL
ALP SERPL-CCNC: 108 U/L (ref 39–117)
ALT SERPL W P-5'-P-CCNC: 14 U/L (ref 1–41)
ANION GAP SERPL CALCULATED.3IONS-SCNC: 6 MMOL/L (ref 5–15)
AST SERPL-CCNC: 35 U/L (ref 1–40)
BILIRUB SERPL-MCNC: 1.3 MG/DL (ref 0–1.2)
BUN SERPL-MCNC: 35 MG/DL (ref 8–23)
BUN/CREAT SERPL: 25.9 (ref 7–25)
CALCIUM SPEC-SCNC: 7.7 MG/DL (ref 8.6–10.5)
CHLORIDE SERPL-SCNC: 109 MMOL/L (ref 98–107)
CO2 SERPL-SCNC: 21 MMOL/L (ref 22–29)
CREAT SERPL-MCNC: 1.35 MG/DL (ref 0.76–1.27)
CRP SERPL-MCNC: 24.45 MG/DL (ref 0–0.5)
DEPRECATED RDW RBC AUTO: 50.4 FL (ref 37–54)
ERYTHROCYTE [DISTWIDTH] IN BLOOD BY AUTOMATED COUNT: 15.1 % (ref 12.3–15.4)
GFR SERPL CREATININE-BSD FRML MDRD: 52 ML/MIN/1.73
GLOBULIN UR ELPH-MCNC: 2.5 GM/DL
GLUCOSE BLDC GLUCOMTR-MCNC: 134 MG/DL (ref 70–130)
GLUCOSE BLDC GLUCOMTR-MCNC: 55 MG/DL (ref 70–130)
GLUCOSE BLDC GLUCOMTR-MCNC: 78 MG/DL (ref 70–130)
GLUCOSE BLDC GLUCOMTR-MCNC: 88 MG/DL (ref 70–130)
GLUCOSE SERPL-MCNC: 53 MG/DL (ref 65–99)
HCT VFR BLD AUTO: 31.9 % (ref 37.5–51)
HGB BLD-MCNC: 10.6 G/DL (ref 13–17.7)
MAGNESIUM SERPL-MCNC: 2.8 MG/DL (ref 1.6–2.4)
MCH RBC QN AUTO: 30.7 PG (ref 26.6–33)
MCHC RBC AUTO-ENTMCNC: 33.2 G/DL (ref 31.5–35.7)
MCV RBC AUTO: 92.5 FL (ref 79–97)
MRSA DNA SPEC QL NAA+PROBE: NORMAL
PLATELET # BLD AUTO: 135 10*3/MM3 (ref 140–450)
PMV BLD AUTO: 9.8 FL (ref 6–12)
POTASSIUM SERPL-SCNC: 5.7 MMOL/L (ref 3.5–5.2)
PROCALCITONIN SERPL-MCNC: 54.88 NG/ML (ref 0–0.25)
PROT SERPL-MCNC: 5 G/DL (ref 6–8.5)
RBC # BLD AUTO: 3.45 10*6/MM3 (ref 4.14–5.8)
S PNEUM AG SPEC QL LA: NEGATIVE
SODIUM SERPL-SCNC: 136 MMOL/L (ref 136–145)
WBC NRBC COR # BLD: 20.9 10*3/MM3 (ref 3.4–10.8)

## 2022-02-10 PROCEDURE — 83735 ASSAY OF MAGNESIUM: CPT | Performed by: INTERNAL MEDICINE

## 2022-02-10 PROCEDURE — 82962 GLUCOSE BLOOD TEST: CPT

## 2022-02-10 PROCEDURE — 87899 AGENT NOS ASSAY W/OPTIC: CPT | Performed by: INTERNAL MEDICINE

## 2022-02-10 PROCEDURE — 87641 MR-STAPH DNA AMP PROBE: CPT | Performed by: INTERNAL MEDICINE

## 2022-02-10 PROCEDURE — 85027 COMPLETE CBC AUTOMATED: CPT | Performed by: INTERNAL MEDICINE

## 2022-02-10 PROCEDURE — 25010000002 ENOXAPARIN PER 10 MG: Performed by: INTERNAL MEDICINE

## 2022-02-10 PROCEDURE — 94640 AIRWAY INHALATION TREATMENT: CPT

## 2022-02-10 PROCEDURE — 80053 COMPREHEN METABOLIC PANEL: CPT | Performed by: INTERNAL MEDICINE

## 2022-02-10 PROCEDURE — 84145 PROCALCITONIN (PCT): CPT | Performed by: INTERNAL MEDICINE

## 2022-02-10 PROCEDURE — 86140 C-REACTIVE PROTEIN: CPT | Performed by: INTERNAL MEDICINE

## 2022-02-10 PROCEDURE — 25010000002 PIPERACILLIN SOD-TAZOBACTAM PER 1 G: Performed by: INTERNAL MEDICINE

## 2022-02-10 PROCEDURE — 92610 EVALUATE SWALLOWING FUNCTION: CPT

## 2022-02-10 PROCEDURE — 94799 UNLISTED PULMONARY SVC/PX: CPT

## 2022-02-10 PROCEDURE — 97530 THERAPEUTIC ACTIVITIES: CPT

## 2022-02-10 RX ORDER — DEXTROSE MONOHYDRATE 25 G/50ML
INJECTION, SOLUTION INTRAVENOUS
Status: COMPLETED
Start: 2022-02-10 | End: 2022-02-10

## 2022-02-10 RX ORDER — ALBUTEROL SULFATE 90 UG/1
2 AEROSOL, METERED RESPIRATORY (INHALATION)
Status: DISCONTINUED | OUTPATIENT
Start: 2022-02-10 | End: 2022-02-22

## 2022-02-10 RX ADMIN — AMLODIPINE BESYLATE 5 MG: 5 TABLET ORAL at 08:31

## 2022-02-10 RX ADMIN — MEXILETINE HYDROCHLORIDE 300 MG: 150 CAPSULE ORAL at 08:31

## 2022-02-10 RX ADMIN — OXYCODONE HYDROCHLORIDE AND ACETAMINOPHEN 500 MG: 500 TABLET ORAL at 08:39

## 2022-02-10 RX ADMIN — POLYETHYLENE GLYCOL 3350 17 G: 17 POWDER, FOR SOLUTION ORAL at 08:34

## 2022-02-10 RX ADMIN — SODIUM CHLORIDE, PRESERVATIVE FREE 10 ML: 5 INJECTION INTRAVENOUS at 08:32

## 2022-02-10 RX ADMIN — ALBUTEROL SULFATE 2 PUFF: 90 AEROSOL, METERED RESPIRATORY (INHALATION) at 14:19

## 2022-02-10 RX ADMIN — THIAMINE HCL TAB 100 MG 100 MG: 100 TAB at 08:31

## 2022-02-10 RX ADMIN — ZINC SULFATE 220 MG (50 MG) CAPSULE 220 MG: CAPSULE at 08:31

## 2022-02-10 RX ADMIN — DOCUSATE SODIUM 50 MG AND SENNOSIDES 8.6 MG 1 TABLET: 8.6; 5 TABLET, FILM COATED ORAL at 08:31

## 2022-02-10 RX ADMIN — ACETAMINOPHEN 650 MG: 325 TABLET, FILM COATED ORAL at 11:04

## 2022-02-10 RX ADMIN — SODIUM ZIRCONIUM CYCLOSILICATE 10 G: 10 POWDER, FOR SUSPENSION ORAL at 16:09

## 2022-02-10 RX ADMIN — DOCUSATE SODIUM 50 MG AND SENNOSIDES 8.6 MG 1 TABLET: 8.6; 5 TABLET, FILM COATED ORAL at 21:46

## 2022-02-10 RX ADMIN — PIPERACILLIN SODIUM AND TAZOBACTAM SODIUM 4.5 G: 4; .5 INJECTION, POWDER, LYOPHILIZED, FOR SOLUTION INTRAVENOUS at 12:00

## 2022-02-10 RX ADMIN — NYSTATIN: 100000 POWDER TOPICAL at 08:32

## 2022-02-10 RX ADMIN — MEXILETINE HYDROCHLORIDE 300 MG: 150 CAPSULE ORAL at 21:45

## 2022-02-10 RX ADMIN — OXYCODONE HYDROCHLORIDE 10 MG: 5 TABLET ORAL at 05:22

## 2022-02-10 RX ADMIN — SODIUM CHLORIDE, PRESERVATIVE FREE 10 ML: 5 INJECTION INTRAVENOUS at 22:12

## 2022-02-10 RX ADMIN — ALBUTEROL SULFATE 2 PUFF: 90 AEROSOL, METERED RESPIRATORY (INHALATION) at 19:00

## 2022-02-10 RX ADMIN — NYSTATIN: 100000 POWDER TOPICAL at 22:12

## 2022-02-10 RX ADMIN — OXYCODONE HYDROCHLORIDE 10 MG: 5 TABLET ORAL at 01:23

## 2022-02-10 RX ADMIN — ACETAMINOPHEN 650 MG: 325 TABLET, FILM COATED ORAL at 16:08

## 2022-02-10 RX ADMIN — SODIUM ZIRCONIUM CYCLOSILICATE 10 G: 10 POWDER, FOR SUSPENSION ORAL at 21:45

## 2022-02-10 RX ADMIN — DEXTROSE MONOHYDRATE 50 ML: 500 INJECTION PARENTERAL at 08:45

## 2022-02-10 RX ADMIN — ATORVASTATIN CALCIUM 20 MG: 10 TABLET, FILM COATED ORAL at 21:45

## 2022-02-10 RX ADMIN — TRAZODONE HYDROCHLORIDE 50 MG: 50 TABLET ORAL at 21:45

## 2022-02-10 RX ADMIN — ASPIRIN 81 MG: 81 TABLET ORAL at 08:31

## 2022-02-10 RX ADMIN — TAZOBACTAM SODIUM AND PIPERACILLIN SODIUM 3.38 G: 375; 3 INJECTION, SOLUTION INTRAVENOUS at 18:19

## 2022-02-10 RX ADMIN — OXYCODONE HYDROCHLORIDE 10 MG: 5 TABLET ORAL at 11:04

## 2022-02-10 RX ADMIN — OXYCODONE HYDROCHLORIDE 10 MG: 5 TABLET ORAL at 16:08

## 2022-02-11 PROBLEM — E43 SEVERE MALNUTRITION (HCC): Status: ACTIVE | Noted: 2022-02-11

## 2022-02-11 LAB
ALBUMIN SERPL-MCNC: 2.4 G/DL (ref 3.5–5.2)
ALBUMIN/GLOB SERPL: 1 G/DL
ALP SERPL-CCNC: 113 U/L (ref 39–117)
ALT SERPL W P-5'-P-CCNC: 13 U/L (ref 1–41)
ANION GAP SERPL CALCULATED.3IONS-SCNC: 5 MMOL/L (ref 5–15)
AST SERPL-CCNC: 27 U/L (ref 1–40)
BILIRUB SERPL-MCNC: 1.2 MG/DL (ref 0–1.2)
BUN SERPL-MCNC: 45 MG/DL (ref 8–23)
BUN/CREAT SERPL: 34.9 (ref 7–25)
CALCIUM SPEC-SCNC: 7.8 MG/DL (ref 8.6–10.5)
CHLORIDE SERPL-SCNC: 107 MMOL/L (ref 98–107)
CO2 SERPL-SCNC: 24 MMOL/L (ref 22–29)
CREAT SERPL-MCNC: 1.29 MG/DL (ref 0.76–1.27)
CRP SERPL-MCNC: 26.97 MG/DL (ref 0–0.5)
DEPRECATED RDW RBC AUTO: 54.3 FL (ref 37–54)
ERYTHROCYTE [DISTWIDTH] IN BLOOD BY AUTOMATED COUNT: 15.5 % (ref 12.3–15.4)
GFR SERPL CREATININE-BSD FRML MDRD: 55 ML/MIN/1.73
GLOBULIN UR ELPH-MCNC: 2.5 GM/DL
GLUCOSE BLDC GLUCOMTR-MCNC: 160 MG/DL (ref 70–130)
GLUCOSE SERPL-MCNC: 75 MG/DL (ref 65–99)
HCT VFR BLD AUTO: 29.5 % (ref 37.5–51)
HGB BLD-MCNC: 9.4 G/DL (ref 13–17.7)
MCH RBC QN AUTO: 30.9 PG (ref 26.6–33)
MCHC RBC AUTO-ENTMCNC: 31.9 G/DL (ref 31.5–35.7)
MCV RBC AUTO: 97 FL (ref 79–97)
PLATELET # BLD AUTO: 115 10*3/MM3 (ref 140–450)
PMV BLD AUTO: 10.2 FL (ref 6–12)
POTASSIUM SERPL-SCNC: 5.1 MMOL/L (ref 3.5–5.2)
PROCALCITONIN SERPL-MCNC: 34.79 NG/ML (ref 0–0.25)
PROT SERPL-MCNC: 4.9 G/DL (ref 6–8.5)
QT INTERVAL: 338 MS
QTC INTERVAL: 510 MS
RBC # BLD AUTO: 3.04 10*6/MM3 (ref 4.14–5.8)
SODIUM SERPL-SCNC: 136 MMOL/L (ref 136–145)
WBC NRBC COR # BLD: 11.09 10*3/MM3 (ref 3.4–10.8)

## 2022-02-11 PROCEDURE — 97535 SELF CARE MNGMENT TRAINING: CPT

## 2022-02-11 PROCEDURE — 85027 COMPLETE CBC AUTOMATED: CPT | Performed by: INTERNAL MEDICINE

## 2022-02-11 PROCEDURE — 84145 PROCALCITONIN (PCT): CPT | Performed by: INTERNAL MEDICINE

## 2022-02-11 PROCEDURE — 86140 C-REACTIVE PROTEIN: CPT | Performed by: INTERNAL MEDICINE

## 2022-02-11 PROCEDURE — 63710000001 DEXAMETHASONE PER 0.25 MG: Performed by: INTERNAL MEDICINE

## 2022-02-11 PROCEDURE — 25010000002 PIPERACILLIN SOD-TAZOBACTAM PER 1 G: Performed by: INTERNAL MEDICINE

## 2022-02-11 PROCEDURE — 97530 THERAPEUTIC ACTIVITIES: CPT

## 2022-02-11 PROCEDURE — 97116 GAIT TRAINING THERAPY: CPT

## 2022-02-11 PROCEDURE — 82962 GLUCOSE BLOOD TEST: CPT

## 2022-02-11 PROCEDURE — 94799 UNLISTED PULMONARY SVC/PX: CPT

## 2022-02-11 PROCEDURE — 92526 ORAL FUNCTION THERAPY: CPT

## 2022-02-11 PROCEDURE — 80053 COMPREHEN METABOLIC PANEL: CPT | Performed by: INTERNAL MEDICINE

## 2022-02-11 RX ADMIN — AMLODIPINE BESYLATE 5 MG: 5 TABLET ORAL at 09:15

## 2022-02-11 RX ADMIN — ALBUTEROL SULFATE 2 PUFF: 90 AEROSOL, METERED RESPIRATORY (INHALATION) at 20:17

## 2022-02-11 RX ADMIN — OXYCODONE HYDROCHLORIDE 10 MG: 5 TABLET ORAL at 17:07

## 2022-02-11 RX ADMIN — DOCUSATE SODIUM 50 MG AND SENNOSIDES 8.6 MG 1 TABLET: 8.6; 5 TABLET, FILM COATED ORAL at 09:15

## 2022-02-11 RX ADMIN — ASPIRIN 81 MG: 81 TABLET ORAL at 09:15

## 2022-02-11 RX ADMIN — DOCUSATE SODIUM 50 MG AND SENNOSIDES 8.6 MG 1 TABLET: 8.6; 5 TABLET, FILM COATED ORAL at 22:02

## 2022-02-11 RX ADMIN — ACETAMINOPHEN 650 MG: 325 TABLET, FILM COATED ORAL at 21:58

## 2022-02-11 RX ADMIN — OXYCODONE HYDROCHLORIDE 10 MG: 5 TABLET ORAL at 11:22

## 2022-02-11 RX ADMIN — NYSTATIN: 100000 POWDER TOPICAL at 22:02

## 2022-02-11 RX ADMIN — TAZOBACTAM SODIUM AND PIPERACILLIN SODIUM 3.38 G: 375; 3 INJECTION, SOLUTION INTRAVENOUS at 17:42

## 2022-02-11 RX ADMIN — TRAZODONE HYDROCHLORIDE 50 MG: 50 TABLET ORAL at 22:02

## 2022-02-11 RX ADMIN — TAZOBACTAM SODIUM AND PIPERACILLIN SODIUM 3.38 G: 375; 3 INJECTION, SOLUTION INTRAVENOUS at 02:19

## 2022-02-11 RX ADMIN — TAZOBACTAM SODIUM AND PIPERACILLIN SODIUM 3.38 G: 375; 3 INJECTION, SOLUTION INTRAVENOUS at 09:15

## 2022-02-11 RX ADMIN — SODIUM CHLORIDE, PRESERVATIVE FREE 10 ML: 5 INJECTION INTRAVENOUS at 22:03

## 2022-02-11 RX ADMIN — ALBUTEROL SULFATE 2 PUFF: 90 AEROSOL, METERED RESPIRATORY (INHALATION) at 07:36

## 2022-02-11 RX ADMIN — ZINC SULFATE 220 MG (50 MG) CAPSULE 220 MG: CAPSULE at 09:15

## 2022-02-11 RX ADMIN — DEXAMETHASONE 6 MG: 4 TABLET ORAL at 13:26

## 2022-02-11 RX ADMIN — ACETAMINOPHEN 650 MG: 325 TABLET, FILM COATED ORAL at 13:26

## 2022-02-11 RX ADMIN — OXYCODONE HYDROCHLORIDE AND ACETAMINOPHEN 500 MG: 500 TABLET ORAL at 09:15

## 2022-02-11 RX ADMIN — THIAMINE HCL TAB 100 MG 100 MG: 100 TAB at 09:15

## 2022-02-11 RX ADMIN — SODIUM CHLORIDE, PRESERVATIVE FREE 10 ML: 5 INJECTION INTRAVENOUS at 09:19

## 2022-02-11 RX ADMIN — OXYCODONE HYDROCHLORIDE 10 MG: 5 TABLET ORAL at 21:57

## 2022-02-11 RX ADMIN — ALBUTEROL SULFATE 2 PUFF: 90 AEROSOL, METERED RESPIRATORY (INHALATION) at 11:16

## 2022-02-11 RX ADMIN — MEXILETINE HYDROCHLORIDE 300 MG: 150 CAPSULE ORAL at 22:02

## 2022-02-11 RX ADMIN — ATORVASTATIN CALCIUM 20 MG: 10 TABLET, FILM COATED ORAL at 22:02

## 2022-02-11 RX ADMIN — SODIUM ZIRCONIUM CYCLOSILICATE 10 G: 10 POWDER, FOR SUSPENSION ORAL at 09:15

## 2022-02-11 RX ADMIN — POLYETHYLENE GLYCOL 3350 17 G: 17 POWDER, FOR SOLUTION ORAL at 09:15

## 2022-02-11 RX ADMIN — NYSTATIN: 100000 POWDER TOPICAL at 09:19

## 2022-02-11 RX ADMIN — MEXILETINE HYDROCHLORIDE 300 MG: 150 CAPSULE ORAL at 09:15

## 2022-02-12 LAB
ALBUMIN SERPL-MCNC: 2.5 G/DL (ref 3.5–5.2)
ALBUMIN/GLOB SERPL: 1.3 G/DL
ALP SERPL-CCNC: 206 U/L (ref 39–117)
ALT SERPL W P-5'-P-CCNC: 18 U/L (ref 1–41)
ANION GAP SERPL CALCULATED.3IONS-SCNC: 6 MMOL/L (ref 5–15)
AST SERPL-CCNC: 30 U/L (ref 1–40)
BILIRUB SERPL-MCNC: 0.6 MG/DL (ref 0–1.2)
BUN SERPL-MCNC: 34 MG/DL (ref 8–23)
BUN/CREAT SERPL: 35.4 (ref 7–25)
CALCIUM SPEC-SCNC: 7.3 MG/DL (ref 8.6–10.5)
CHLORIDE SERPL-SCNC: 102 MMOL/L (ref 98–107)
CO2 SERPL-SCNC: 27 MMOL/L (ref 22–29)
CREAT SERPL-MCNC: 0.96 MG/DL (ref 0.76–1.27)
CRP SERPL-MCNC: 10.23 MG/DL (ref 0–0.5)
DEPRECATED RDW RBC AUTO: 51.3 FL (ref 37–54)
ERYTHROCYTE [DISTWIDTH] IN BLOOD BY AUTOMATED COUNT: 14.7 % (ref 12.3–15.4)
GFR SERPL CREATININE-BSD FRML MDRD: 77 ML/MIN/1.73
GLOBULIN UR ELPH-MCNC: 2 GM/DL
GLUCOSE BLDC GLUCOMTR-MCNC: 138 MG/DL (ref 70–130)
GLUCOSE SERPL-MCNC: 120 MG/DL (ref 65–99)
HCT VFR BLD AUTO: 21.7 % (ref 37.5–51)
HGB BLD-MCNC: 7.2 G/DL (ref 13–17.7)
MCH RBC QN AUTO: 32.4 PG (ref 26.6–33)
MCHC RBC AUTO-ENTMCNC: 33.2 G/DL (ref 31.5–35.7)
MCV RBC AUTO: 97.7 FL (ref 79–97)
PLATELET # BLD AUTO: 99 10*3/MM3 (ref 140–450)
PMV BLD AUTO: 10.4 FL (ref 6–12)
POTASSIUM SERPL-SCNC: 4.7 MMOL/L (ref 3.5–5.2)
PROCALCITONIN SERPL-MCNC: 12.8 NG/ML (ref 0–0.25)
PROT SERPL-MCNC: 4.5 G/DL (ref 6–8.5)
RBC # BLD AUTO: 2.22 10*6/MM3 (ref 4.14–5.8)
SODIUM SERPL-SCNC: 135 MMOL/L (ref 136–145)
WBC NRBC COR # BLD: 4.95 10*3/MM3 (ref 3.4–10.8)

## 2022-02-12 PROCEDURE — 63710000001 DEXAMETHASONE PER 0.25 MG: Performed by: INTERNAL MEDICINE

## 2022-02-12 PROCEDURE — 97110 THERAPEUTIC EXERCISES: CPT

## 2022-02-12 PROCEDURE — 85027 COMPLETE CBC AUTOMATED: CPT | Performed by: INTERNAL MEDICINE

## 2022-02-12 PROCEDURE — 25010000002 PIPERACILLIN SOD-TAZOBACTAM PER 1 G: Performed by: INTERNAL MEDICINE

## 2022-02-12 PROCEDURE — 82962 GLUCOSE BLOOD TEST: CPT

## 2022-02-12 PROCEDURE — 80053 COMPREHEN METABOLIC PANEL: CPT | Performed by: INTERNAL MEDICINE

## 2022-02-12 PROCEDURE — 86140 C-REACTIVE PROTEIN: CPT | Performed by: INTERNAL MEDICINE

## 2022-02-12 PROCEDURE — 94799 UNLISTED PULMONARY SVC/PX: CPT

## 2022-02-12 PROCEDURE — 97116 GAIT TRAINING THERAPY: CPT

## 2022-02-12 PROCEDURE — 84145 PROCALCITONIN (PCT): CPT | Performed by: INTERNAL MEDICINE

## 2022-02-12 RX ADMIN — ALBUTEROL SULFATE 2 PUFF: 90 AEROSOL, METERED RESPIRATORY (INHALATION) at 15:09

## 2022-02-12 RX ADMIN — MEXILETINE HYDROCHLORIDE 300 MG: 150 CAPSULE ORAL at 08:01

## 2022-02-12 RX ADMIN — ALBUTEROL SULFATE 2 PUFF: 90 AEROSOL, METERED RESPIRATORY (INHALATION) at 06:46

## 2022-02-12 RX ADMIN — DOCUSATE SODIUM 50 MG AND SENNOSIDES 8.6 MG 1 TABLET: 8.6; 5 TABLET, FILM COATED ORAL at 20:30

## 2022-02-12 RX ADMIN — POLYETHYLENE GLYCOL 3350 17 G: 17 POWDER, FOR SOLUTION ORAL at 08:00

## 2022-02-12 RX ADMIN — SODIUM CHLORIDE, PRESERVATIVE FREE 10 ML: 5 INJECTION INTRAVENOUS at 08:01

## 2022-02-12 RX ADMIN — ALBUTEROL SULFATE 2 PUFF: 90 AEROSOL, METERED RESPIRATORY (INHALATION) at 18:32

## 2022-02-12 RX ADMIN — ACETAMINOPHEN 650 MG: 325 TABLET, FILM COATED ORAL at 03:24

## 2022-02-12 RX ADMIN — SODIUM CHLORIDE, PRESERVATIVE FREE 10 ML: 5 INJECTION INTRAVENOUS at 20:31

## 2022-02-12 RX ADMIN — ZINC SULFATE 220 MG (50 MG) CAPSULE 220 MG: CAPSULE at 08:00

## 2022-02-12 RX ADMIN — DEXAMETHASONE 6 MG: 4 TABLET ORAL at 08:01

## 2022-02-12 RX ADMIN — ALBUTEROL SULFATE 2 PUFF: 90 AEROSOL, METERED RESPIRATORY (INHALATION) at 11:41

## 2022-02-12 RX ADMIN — TRAZODONE HYDROCHLORIDE 50 MG: 50 TABLET ORAL at 20:30

## 2022-02-12 RX ADMIN — TAZOBACTAM SODIUM AND PIPERACILLIN SODIUM 3.38 G: 375; 3 INJECTION, SOLUTION INTRAVENOUS at 09:18

## 2022-02-12 RX ADMIN — OXYCODONE HYDROCHLORIDE AND ACETAMINOPHEN 500 MG: 500 TABLET ORAL at 08:01

## 2022-02-12 RX ADMIN — OXYCODONE HYDROCHLORIDE 10 MG: 5 TABLET ORAL at 20:30

## 2022-02-12 RX ADMIN — DOCUSATE SODIUM 50 MG AND SENNOSIDES 8.6 MG 1 TABLET: 8.6; 5 TABLET, FILM COATED ORAL at 08:01

## 2022-02-12 RX ADMIN — TAZOBACTAM SODIUM AND PIPERACILLIN SODIUM 3.38 G: 375; 3 INJECTION, SOLUTION INTRAVENOUS at 03:01

## 2022-02-12 RX ADMIN — OXYCODONE HYDROCHLORIDE 10 MG: 5 TABLET ORAL at 08:00

## 2022-02-12 RX ADMIN — NYSTATIN: 100000 POWDER TOPICAL at 08:01

## 2022-02-12 RX ADMIN — TAZOBACTAM SODIUM AND PIPERACILLIN SODIUM 3.38 G: 375; 3 INJECTION, SOLUTION INTRAVENOUS at 18:12

## 2022-02-12 RX ADMIN — MEXILETINE HYDROCHLORIDE 300 MG: 150 CAPSULE ORAL at 20:30

## 2022-02-12 RX ADMIN — OXYCODONE HYDROCHLORIDE 10 MG: 5 TABLET ORAL at 11:44

## 2022-02-12 RX ADMIN — AMLODIPINE BESYLATE 5 MG: 5 TABLET ORAL at 08:01

## 2022-02-12 RX ADMIN — ACETAMINOPHEN 650 MG: 325 TABLET, FILM COATED ORAL at 20:30

## 2022-02-12 RX ADMIN — ACETAMINOPHEN 650 MG: 325 TABLET, FILM COATED ORAL at 11:45

## 2022-02-12 RX ADMIN — OXYCODONE HYDROCHLORIDE 10 MG: 5 TABLET ORAL at 15:41

## 2022-02-12 RX ADMIN — NYSTATIN: 100000 POWDER TOPICAL at 20:31

## 2022-02-12 RX ADMIN — OXYCODONE HYDROCHLORIDE 10 MG: 5 TABLET ORAL at 03:25

## 2022-02-12 RX ADMIN — THIAMINE HCL TAB 100 MG 100 MG: 100 TAB at 08:00

## 2022-02-12 RX ADMIN — ASPIRIN 81 MG: 81 TABLET ORAL at 08:01

## 2022-02-12 RX ADMIN — ATORVASTATIN CALCIUM 20 MG: 10 TABLET, FILM COATED ORAL at 20:30

## 2022-02-13 LAB
ABO GROUP BLD: NORMAL
ALBUMIN SERPL-MCNC: 2.4 G/DL (ref 3.5–5.2)
ALBUMIN/GLOB SERPL: 1.4 G/DL
ALP SERPL-CCNC: 160 U/L (ref 39–117)
ALT SERPL W P-5'-P-CCNC: 15 U/L (ref 1–41)
ANION GAP SERPL CALCULATED.3IONS-SCNC: 4 MMOL/L (ref 5–15)
AST SERPL-CCNC: 21 U/L (ref 1–40)
BACTERIA SPEC AEROBE CULT: NORMAL
BACTERIA SPEC AEROBE CULT: NORMAL
BILIRUB SERPL-MCNC: 0.5 MG/DL (ref 0–1.2)
BLD GP AB SCN SERPL QL: NEGATIVE
BUN SERPL-MCNC: 33 MG/DL (ref 8–23)
BUN/CREAT SERPL: 33.7 (ref 7–25)
CALCIUM SPEC-SCNC: 7.5 MG/DL (ref 8.6–10.5)
CHLORIDE SERPL-SCNC: 105 MMOL/L (ref 98–107)
CO2 SERPL-SCNC: 27 MMOL/L (ref 22–29)
CREAT SERPL-MCNC: 0.98 MG/DL (ref 0.76–1.27)
CRP SERPL-MCNC: 4.88 MG/DL (ref 0–0.5)
DEPRECATED RDW RBC AUTO: 52.2 FL (ref 37–54)
ERYTHROCYTE [DISTWIDTH] IN BLOOD BY AUTOMATED COUNT: 14.5 % (ref 12.3–15.4)
GFR SERPL CREATININE-BSD FRML MDRD: 75 ML/MIN/1.73
GLOBULIN UR ELPH-MCNC: 1.7 GM/DL
GLUCOSE SERPL-MCNC: 78 MG/DL (ref 65–99)
HCT VFR BLD AUTO: 20.4 % (ref 37.5–51)
HGB BLD-MCNC: 6.7 G/DL (ref 13–17.7)
IRON 24H UR-MRATE: 98 MCG/DL (ref 59–158)
IRON SATN MFR SERPL: 53 % (ref 20–50)
MCH RBC QN AUTO: 32.4 PG (ref 26.6–33)
MCHC RBC AUTO-ENTMCNC: 32.8 G/DL (ref 31.5–35.7)
MCV RBC AUTO: 98.6 FL (ref 79–97)
PLATELET # BLD AUTO: 104 10*3/MM3 (ref 140–450)
PMV BLD AUTO: 10.1 FL (ref 6–12)
POTASSIUM SERPL-SCNC: 5.3 MMOL/L (ref 3.5–5.2)
PROCALCITONIN SERPL-MCNC: 7.95 NG/ML (ref 0–0.25)
PROT SERPL-MCNC: 4.1 G/DL (ref 6–8.5)
RBC # BLD AUTO: 2.07 10*6/MM3 (ref 4.14–5.8)
RH BLD: POSITIVE
SODIUM SERPL-SCNC: 136 MMOL/L (ref 136–145)
T&S EXPIRATION DATE: NORMAL
TIBC SERPL-MCNC: 185 MCG/DL (ref 298–536)
TRANSFERRIN SERPL-MCNC: 124 MG/DL (ref 200–360)
WBC NRBC COR # BLD: 6.56 10*3/MM3 (ref 3.4–10.8)

## 2022-02-13 PROCEDURE — 86901 BLOOD TYPING SEROLOGIC RH(D): CPT

## 2022-02-13 PROCEDURE — 86140 C-REACTIVE PROTEIN: CPT | Performed by: INTERNAL MEDICINE

## 2022-02-13 PROCEDURE — 86923 COMPATIBILITY TEST ELECTRIC: CPT

## 2022-02-13 PROCEDURE — P9016 RBC LEUKOCYTES REDUCED: HCPCS

## 2022-02-13 PROCEDURE — 94799 UNLISTED PULMONARY SVC/PX: CPT

## 2022-02-13 PROCEDURE — 86900 BLOOD TYPING SEROLOGIC ABO: CPT

## 2022-02-13 PROCEDURE — 36430 TRANSFUSION BLD/BLD COMPNT: CPT

## 2022-02-13 PROCEDURE — 83540 ASSAY OF IRON: CPT | Performed by: INTERNAL MEDICINE

## 2022-02-13 PROCEDURE — 84145 PROCALCITONIN (PCT): CPT | Performed by: INTERNAL MEDICINE

## 2022-02-13 PROCEDURE — 86901 BLOOD TYPING SEROLOGIC RH(D): CPT | Performed by: INTERNAL MEDICINE

## 2022-02-13 PROCEDURE — 80053 COMPREHEN METABOLIC PANEL: CPT | Performed by: INTERNAL MEDICINE

## 2022-02-13 PROCEDURE — 85027 COMPLETE CBC AUTOMATED: CPT | Performed by: INTERNAL MEDICINE

## 2022-02-13 PROCEDURE — 63710000001 DEXAMETHASONE PER 0.25 MG: Performed by: INTERNAL MEDICINE

## 2022-02-13 PROCEDURE — 86850 RBC ANTIBODY SCREEN: CPT | Performed by: INTERNAL MEDICINE

## 2022-02-13 PROCEDURE — 86900 BLOOD TYPING SEROLOGIC ABO: CPT | Performed by: INTERNAL MEDICINE

## 2022-02-13 PROCEDURE — 84466 ASSAY OF TRANSFERRIN: CPT | Performed by: INTERNAL MEDICINE

## 2022-02-13 PROCEDURE — 25010000002 PIPERACILLIN SOD-TAZOBACTAM PER 1 G: Performed by: INTERNAL MEDICINE

## 2022-02-13 RX ORDER — OXYCODONE AND ACETAMINOPHEN 10; 325 MG/1; MG/1
1 TABLET ORAL EVERY 4 HOURS PRN
Status: DISCONTINUED | OUTPATIENT
Start: 2022-02-13 | End: 2022-02-19

## 2022-02-13 RX ADMIN — DOCUSATE SODIUM 50 MG AND SENNOSIDES 8.6 MG 1 TABLET: 8.6; 5 TABLET, FILM COATED ORAL at 22:53

## 2022-02-13 RX ADMIN — ALBUTEROL SULFATE 2 PUFF: 90 AEROSOL, METERED RESPIRATORY (INHALATION) at 18:51

## 2022-02-13 RX ADMIN — ALBUTEROL SULFATE 2 PUFF: 90 AEROSOL, METERED RESPIRATORY (INHALATION) at 14:38

## 2022-02-13 RX ADMIN — ATORVASTATIN CALCIUM 20 MG: 10 TABLET, FILM COATED ORAL at 22:53

## 2022-02-13 RX ADMIN — ASPIRIN 81 MG: 81 TABLET ORAL at 08:03

## 2022-02-13 RX ADMIN — DOCUSATE SODIUM 50 MG AND SENNOSIDES 8.6 MG 1 TABLET: 8.6; 5 TABLET, FILM COATED ORAL at 08:03

## 2022-02-13 RX ADMIN — TAZOBACTAM SODIUM AND PIPERACILLIN SODIUM 3.38 G: 375; 3 INJECTION, SOLUTION INTRAVENOUS at 18:34

## 2022-02-13 RX ADMIN — NYSTATIN: 100000 POWDER TOPICAL at 22:53

## 2022-02-13 RX ADMIN — OXYCODONE HYDROCHLORIDE AND ACETAMINOPHEN 500 MG: 500 TABLET ORAL at 08:03

## 2022-02-13 RX ADMIN — OXYCODONE AND ACETAMINOPHEN 1 TABLET: 325; 10 TABLET ORAL at 22:51

## 2022-02-13 RX ADMIN — THIAMINE HCL TAB 100 MG 100 MG: 100 TAB at 08:03

## 2022-02-13 RX ADMIN — MEXILETINE HYDROCHLORIDE 300 MG: 150 CAPSULE ORAL at 08:03

## 2022-02-13 RX ADMIN — AMLODIPINE BESYLATE 5 MG: 5 TABLET ORAL at 08:03

## 2022-02-13 RX ADMIN — MEXILETINE HYDROCHLORIDE 300 MG: 150 CAPSULE ORAL at 22:53

## 2022-02-13 RX ADMIN — DEXAMETHASONE 6 MG: 4 TABLET ORAL at 08:03

## 2022-02-13 RX ADMIN — NYSTATIN: 100000 POWDER TOPICAL at 08:03

## 2022-02-13 RX ADMIN — OXYCODONE AND ACETAMINOPHEN 1 TABLET: 325; 10 TABLET ORAL at 18:34

## 2022-02-13 RX ADMIN — SODIUM CHLORIDE, PRESERVATIVE FREE 10 ML: 5 INJECTION INTRAVENOUS at 22:53

## 2022-02-13 RX ADMIN — ALBUTEROL SULFATE 2 PUFF: 90 AEROSOL, METERED RESPIRATORY (INHALATION) at 07:21

## 2022-02-13 RX ADMIN — OXYCODONE HYDROCHLORIDE 10 MG: 5 TABLET ORAL at 10:18

## 2022-02-13 RX ADMIN — TRAZODONE HYDROCHLORIDE 50 MG: 50 TABLET ORAL at 22:53

## 2022-02-13 RX ADMIN — OXYCODONE HYDROCHLORIDE 10 MG: 5 TABLET ORAL at 05:38

## 2022-02-13 RX ADMIN — TAZOBACTAM SODIUM AND PIPERACILLIN SODIUM 3.38 G: 375; 3 INJECTION, SOLUTION INTRAVENOUS at 12:29

## 2022-02-13 RX ADMIN — TAZOBACTAM SODIUM AND PIPERACILLIN SODIUM 3.38 G: 375; 3 INJECTION, SOLUTION INTRAVENOUS at 02:03

## 2022-02-13 RX ADMIN — SODIUM CHLORIDE, PRESERVATIVE FREE 10 ML: 5 INJECTION INTRAVENOUS at 08:03

## 2022-02-13 RX ADMIN — POLYETHYLENE GLYCOL 3350 17 G: 17 POWDER, FOR SOLUTION ORAL at 08:02

## 2022-02-13 RX ADMIN — ZINC SULFATE 220 MG (50 MG) CAPSULE 220 MG: CAPSULE at 08:02

## 2022-02-13 RX ADMIN — ALBUTEROL SULFATE 2 PUFF: 90 AEROSOL, METERED RESPIRATORY (INHALATION) at 10:52

## 2022-02-13 RX ADMIN — OXYCODONE HYDROCHLORIDE 10 MG: 5 TABLET ORAL at 14:41

## 2022-02-14 PROBLEM — E87.5 HYPERKALEMIA: Status: ACTIVE | Noted: 2022-02-14

## 2022-02-14 LAB
ALBUMIN SERPL-MCNC: 2.4 G/DL (ref 3.5–5.2)
ALBUMIN/GLOB SERPL: 1.3 G/DL
ALP SERPL-CCNC: 123 U/L (ref 39–117)
ALT SERPL W P-5'-P-CCNC: 14 U/L (ref 1–41)
ANION GAP SERPL CALCULATED.3IONS-SCNC: 2 MMOL/L (ref 5–15)
ANION GAP SERPL CALCULATED.3IONS-SCNC: 6 MMOL/L (ref 5–15)
AST SERPL-CCNC: 16 U/L (ref 1–40)
BACTERIA UR QL AUTO: ABNORMAL /HPF
BH BB BLOOD EXPIRATION DATE: NORMAL
BH BB BLOOD TYPE BARCODE: 6200
BH BB DISPENSE STATUS: NORMAL
BH BB PRODUCT CODE: NORMAL
BH BB UNIT NUMBER: NORMAL
BILIRUB SERPL-MCNC: 0.3 MG/DL (ref 0–1.2)
BILIRUB UR QL STRIP: NEGATIVE
BUN SERPL-MCNC: 29 MG/DL (ref 8–23)
BUN SERPL-MCNC: 32 MG/DL (ref 8–23)
BUN/CREAT SERPL: 33.7 (ref 7–25)
BUN/CREAT SERPL: 39 (ref 7–25)
CALCIUM SPEC-SCNC: 7.4 MG/DL (ref 8.6–10.5)
CALCIUM SPEC-SCNC: 7.4 MG/DL (ref 8.6–10.5)
CHLORIDE SERPL-SCNC: 102 MMOL/L (ref 98–107)
CHLORIDE SERPL-SCNC: 106 MMOL/L (ref 98–107)
CLARITY UR: CLEAR
CO2 SERPL-SCNC: 26 MMOL/L (ref 22–29)
CO2 SERPL-SCNC: 26 MMOL/L (ref 22–29)
COLOR UR: YELLOW
CREAT SERPL-MCNC: 0.82 MG/DL (ref 0.76–1.27)
CREAT SERPL-MCNC: 0.86 MG/DL (ref 0.76–1.27)
CROSSMATCH INTERPRETATION: NORMAL
CRP SERPL-MCNC: 2.48 MG/DL (ref 0–0.5)
DEPRECATED RDW RBC AUTO: 48.1 FL (ref 37–54)
ERYTHROCYTE [DISTWIDTH] IN BLOOD BY AUTOMATED COUNT: 14.4 % (ref 12.3–15.4)
FOLATE SERPL-MCNC: <2 NG/ML (ref 4.78–24.2)
GFR SERPL CREATININE-BSD FRML MDRD: 88 ML/MIN/1.73
GFR SERPL CREATININE-BSD FRML MDRD: 93 ML/MIN/1.73
GLOBULIN UR ELPH-MCNC: 1.8 GM/DL
GLUCOSE SERPL-MCNC: 111 MG/DL (ref 65–99)
GLUCOSE SERPL-MCNC: 88 MG/DL (ref 65–99)
GLUCOSE UR STRIP-MCNC: NEGATIVE MG/DL
HCT VFR BLD AUTO: 21.2 % (ref 37.5–51)
HGB BLD-MCNC: 6.9 G/DL (ref 13–17.7)
HGB UR QL STRIP.AUTO: NEGATIVE
HYALINE CASTS UR QL AUTO: ABNORMAL /LPF
KETONES UR QL STRIP: NEGATIVE
LEUKOCYTE ESTERASE UR QL STRIP.AUTO: ABNORMAL
MCH RBC QN AUTO: 31.1 PG (ref 26.6–33)
MCHC RBC AUTO-ENTMCNC: 32.5 G/DL (ref 31.5–35.7)
MCV RBC AUTO: 95.5 FL (ref 79–97)
NITRITE UR QL STRIP: NEGATIVE
PH UR STRIP.AUTO: 7.5 [PH] (ref 5–8)
PLATELET # BLD AUTO: 109 10*3/MM3 (ref 140–450)
PMV BLD AUTO: 10 FL (ref 6–12)
POTASSIUM SERPL-SCNC: 5.5 MMOL/L (ref 3.5–5.2)
POTASSIUM SERPL-SCNC: 5.7 MMOL/L (ref 3.5–5.2)
PROCALCITONIN SERPL-MCNC: 4.08 NG/ML (ref 0–0.25)
PROT SERPL-MCNC: 4.2 G/DL (ref 6–8.5)
PROT UR QL STRIP: ABNORMAL
RBC # BLD AUTO: 2.22 10*6/MM3 (ref 4.14–5.8)
RBC # UR STRIP: ABNORMAL /HPF
REF LAB TEST METHOD: ABNORMAL
SODIUM SERPL-SCNC: 134 MMOL/L (ref 136–145)
SODIUM SERPL-SCNC: 134 MMOL/L (ref 136–145)
SP GR UR STRIP: 1.02 (ref 1–1.03)
SQUAMOUS #/AREA URNS HPF: ABNORMAL /HPF
UNIT  ABO: NORMAL
UNIT  RH: NORMAL
UROBILINOGEN UR QL STRIP: ABNORMAL
WBC # UR STRIP: ABNORMAL /HPF
WBC NRBC COR # BLD: 4.24 10*3/MM3 (ref 3.4–10.8)

## 2022-02-14 PROCEDURE — 25010000002 ENOXAPARIN PER 10 MG: Performed by: INTERNAL MEDICINE

## 2022-02-14 PROCEDURE — 36430 TRANSFUSION BLD/BLD COMPNT: CPT

## 2022-02-14 PROCEDURE — 84154 ASSAY OF PSA FREE: CPT

## 2022-02-14 PROCEDURE — 80053 COMPREHEN METABOLIC PANEL: CPT | Performed by: INTERNAL MEDICINE

## 2022-02-14 PROCEDURE — 86900 BLOOD TYPING SEROLOGIC ABO: CPT

## 2022-02-14 PROCEDURE — 87086 URINE CULTURE/COLONY COUNT: CPT

## 2022-02-14 PROCEDURE — 84153 ASSAY OF PSA TOTAL: CPT

## 2022-02-14 PROCEDURE — 85027 COMPLETE CBC AUTOMATED: CPT | Performed by: INTERNAL MEDICINE

## 2022-02-14 PROCEDURE — 63710000001 DEXAMETHASONE PER 0.25 MG: Performed by: INTERNAL MEDICINE

## 2022-02-14 PROCEDURE — 94799 UNLISTED PULMONARY SVC/PX: CPT

## 2022-02-14 PROCEDURE — 81001 URINALYSIS AUTO W/SCOPE: CPT

## 2022-02-14 PROCEDURE — 86140 C-REACTIVE PROTEIN: CPT | Performed by: INTERNAL MEDICINE

## 2022-02-14 PROCEDURE — 92526 ORAL FUNCTION THERAPY: CPT

## 2022-02-14 PROCEDURE — 97535 SELF CARE MNGMENT TRAINING: CPT

## 2022-02-14 PROCEDURE — 97530 THERAPEUTIC ACTIVITIES: CPT

## 2022-02-14 PROCEDURE — 82607 VITAMIN B-12: CPT

## 2022-02-14 PROCEDURE — P9016 RBC LEUKOCYTES REDUCED: HCPCS

## 2022-02-14 PROCEDURE — 25010000002 PIPERACILLIN SOD-TAZOBACTAM PER 1 G: Performed by: INTERNAL MEDICINE

## 2022-02-14 PROCEDURE — 84145 PROCALCITONIN (PCT): CPT | Performed by: INTERNAL MEDICINE

## 2022-02-14 PROCEDURE — 82746 ASSAY OF FOLIC ACID SERUM: CPT

## 2022-02-14 RX ADMIN — AMLODIPINE BESYLATE 5 MG: 5 TABLET ORAL at 08:25

## 2022-02-14 RX ADMIN — DEXAMETHASONE 6 MG: 4 TABLET ORAL at 09:48

## 2022-02-14 RX ADMIN — TRAZODONE HYDROCHLORIDE 50 MG: 50 TABLET ORAL at 20:12

## 2022-02-14 RX ADMIN — OXYCODONE AND ACETAMINOPHEN 1 TABLET: 325; 10 TABLET ORAL at 06:57

## 2022-02-14 RX ADMIN — THIAMINE HCL TAB 100 MG 100 MG: 100 TAB at 08:21

## 2022-02-14 RX ADMIN — OXYCODONE AND ACETAMINOPHEN 1 TABLET: 325; 10 TABLET ORAL at 11:16

## 2022-02-14 RX ADMIN — DOCUSATE SODIUM 50 MG AND SENNOSIDES 8.6 MG 1 TABLET: 8.6; 5 TABLET, FILM COATED ORAL at 08:25

## 2022-02-14 RX ADMIN — OXYCODONE AND ACETAMINOPHEN 1 TABLET: 325; 10 TABLET ORAL at 02:55

## 2022-02-14 RX ADMIN — ATORVASTATIN CALCIUM 20 MG: 10 TABLET, FILM COATED ORAL at 20:12

## 2022-02-14 RX ADMIN — ALBUTEROL SULFATE 2 PUFF: 90 AEROSOL, METERED RESPIRATORY (INHALATION) at 10:39

## 2022-02-14 RX ADMIN — POLYETHYLENE GLYCOL 3350 17 G: 17 POWDER, FOR SOLUTION ORAL at 08:25

## 2022-02-14 RX ADMIN — ASPIRIN 81 MG: 81 TABLET ORAL at 08:24

## 2022-02-14 RX ADMIN — OXYCODONE HYDROCHLORIDE AND ACETAMINOPHEN 500 MG: 500 TABLET ORAL at 09:14

## 2022-02-14 RX ADMIN — ALBUTEROL SULFATE 2 PUFF: 90 AEROSOL, METERED RESPIRATORY (INHALATION) at 20:55

## 2022-02-14 RX ADMIN — SODIUM ZIRCONIUM CYCLOSILICATE 10 G: 10 POWDER, FOR SUSPENSION ORAL at 09:48

## 2022-02-14 RX ADMIN — TAZOBACTAM SODIUM AND PIPERACILLIN SODIUM 3.38 G: 375; 3 INJECTION, SOLUTION INTRAVENOUS at 20:12

## 2022-02-14 RX ADMIN — SODIUM CHLORIDE, PRESERVATIVE FREE 10 ML: 5 INJECTION INTRAVENOUS at 20:23

## 2022-02-14 RX ADMIN — NYSTATIN: 100000 POWDER TOPICAL at 08:28

## 2022-02-14 RX ADMIN — MEXILETINE HYDROCHLORIDE 300 MG: 150 CAPSULE ORAL at 20:13

## 2022-02-14 RX ADMIN — OXYCODONE AND ACETAMINOPHEN 1 TABLET: 325; 10 TABLET ORAL at 20:12

## 2022-02-14 RX ADMIN — ZINC SULFATE 220 MG (50 MG) CAPSULE 220 MG: CAPSULE at 08:22

## 2022-02-14 RX ADMIN — SODIUM CHLORIDE, PRESERVATIVE FREE 10 ML: 5 INJECTION INTRAVENOUS at 08:27

## 2022-02-14 RX ADMIN — TAZOBACTAM SODIUM AND PIPERACILLIN SODIUM 3.38 G: 375; 3 INJECTION, SOLUTION INTRAVENOUS at 02:54

## 2022-02-14 RX ADMIN — TAZOBACTAM SODIUM AND PIPERACILLIN SODIUM 3.38 G: 375; 3 INJECTION, SOLUTION INTRAVENOUS at 11:16

## 2022-02-14 RX ADMIN — ENOXAPARIN SODIUM 30 MG: 30 INJECTION SUBCUTANEOUS at 20:12

## 2022-02-14 RX ADMIN — ALBUTEROL SULFATE 2 PUFF: 90 AEROSOL, METERED RESPIRATORY (INHALATION) at 07:18

## 2022-02-14 RX ADMIN — NYSTATIN: 100000 POWDER TOPICAL at 20:14

## 2022-02-14 RX ADMIN — DOCUSATE SODIUM 50 MG AND SENNOSIDES 8.6 MG 1 TABLET: 8.6; 5 TABLET, FILM COATED ORAL at 20:12

## 2022-02-14 RX ADMIN — ALBUTEROL SULFATE 2 PUFF: 90 AEROSOL, METERED RESPIRATORY (INHALATION) at 14:43

## 2022-02-14 RX ADMIN — MEXILETINE HYDROCHLORIDE 300 MG: 150 CAPSULE ORAL at 08:21

## 2022-02-15 ENCOUNTER — APPOINTMENT (OUTPATIENT)
Dept: GENERAL RADIOLOGY | Facility: HOSPITAL | Age: 72
End: 2022-02-15

## 2022-02-15 LAB
ALBUMIN SERPL-MCNC: 2.6 G/DL (ref 3.5–5.2)
ALBUMIN/GLOB SERPL: 1.4 G/DL
ALP SERPL-CCNC: 107 U/L (ref 39–117)
ALT SERPL W P-5'-P-CCNC: 15 U/L (ref 1–41)
ANION GAP SERPL CALCULATED.3IONS-SCNC: 5 MMOL/L (ref 5–15)
AST SERPL-CCNC: 16 U/L (ref 1–40)
BASO STIPL COARSE BLD QL SMEAR: ABNORMAL
BH BB BLOOD EXPIRATION DATE: NORMAL
BH BB BLOOD TYPE BARCODE: 6200
BH BB DISPENSE STATUS: NORMAL
BH BB PRODUCT CODE: NORMAL
BH BB UNIT NUMBER: NORMAL
BILIRUB SERPL-MCNC: 0.4 MG/DL (ref 0–1.2)
BUN SERPL-MCNC: 25 MG/DL (ref 8–23)
BUN/CREAT SERPL: 28.1 (ref 7–25)
CALCIUM SPEC-SCNC: 7.2 MG/DL (ref 8.6–10.5)
CHLORIDE SERPL-SCNC: 103 MMOL/L (ref 98–107)
CO2 SERPL-SCNC: 25 MMOL/L (ref 22–29)
CREAT SERPL-MCNC: 0.89 MG/DL (ref 0.76–1.27)
CROSSMATCH INTERPRETATION: NORMAL
CRP SERPL-MCNC: 1.55 MG/DL (ref 0–0.5)
DEPRECATED RDW RBC AUTO: 50 FL (ref 37–54)
ERYTHROCYTE [DISTWIDTH] IN BLOOD BY AUTOMATED COUNT: 14.8 % (ref 12.3–15.4)
GFR SERPL CREATININE-BSD FRML MDRD: 84 ML/MIN/1.73
GIANT PLATELETS: ABNORMAL
GLOBULIN UR ELPH-MCNC: 1.8 GM/DL
GLUCOSE SERPL-MCNC: 74 MG/DL (ref 65–99)
HCT VFR BLD AUTO: 26.1 % (ref 37.5–51)
HGB BLD-MCNC: 8.5 G/DL (ref 13–17.7)
LYMPHOCYTES # BLD MANUAL: 0.69 10*3/MM3 (ref 0.7–3.1)
LYMPHOCYTES NFR BLD MANUAL: 10.5 % (ref 5–12)
MCH RBC QN AUTO: 30.2 PG (ref 26.6–33)
MCHC RBC AUTO-ENTMCNC: 32.6 G/DL (ref 31.5–35.7)
MCV RBC AUTO: 92.9 FL (ref 79–97)
MONOCYTES # BLD: 0.49 10*3/MM3 (ref 0.1–0.9)
NEUTROPHILS # BLD AUTO: 3.49 10*3/MM3 (ref 1.7–7)
NEUTROPHILS NFR BLD MANUAL: 74.7 % (ref 42.7–76)
PLATELET # BLD AUTO: 138 10*3/MM3 (ref 140–450)
PMV BLD AUTO: 9.4 FL (ref 6–12)
POIKILOCYTOSIS BLD QL SMEAR: ABNORMAL
POLYCHROMASIA BLD QL SMEAR: ABNORMAL
POTASSIUM SERPL-SCNC: 5 MMOL/L (ref 3.5–5.2)
PROCALCITONIN SERPL-MCNC: 2.24 NG/ML (ref 0–0.25)
PROT SERPL-MCNC: 4.4 G/DL (ref 6–8.5)
PSA FREE MFR SERPL: 8.9 %
PSA FREE SERPL-MCNC: 1.24 NG/ML
PSA SERPL-MCNC: 13.9 NG/ML (ref 0–4)
RBC # BLD AUTO: 2.81 10*6/MM3 (ref 4.14–5.8)
SMALL PLATELETS BLD QL SMEAR: ABNORMAL
SODIUM SERPL-SCNC: 133 MMOL/L (ref 136–145)
UNIT  ABO: NORMAL
UNIT  RH: NORMAL
VARIANT LYMPHS NFR BLD MANUAL: 14.7 % (ref 19.6–45.3)
VIT B12 BLD-MCNC: 1291 PG/ML (ref 211–946)
WBC MORPH BLD: NORMAL
WBC NRBC COR # BLD: 4.67 10*3/MM3 (ref 3.4–10.8)

## 2022-02-15 PROCEDURE — 94799 UNLISTED PULMONARY SVC/PX: CPT

## 2022-02-15 PROCEDURE — 80053 COMPREHEN METABOLIC PANEL: CPT

## 2022-02-15 PROCEDURE — 85025 COMPLETE CBC W/AUTO DIFF WBC: CPT

## 2022-02-15 PROCEDURE — 97116 GAIT TRAINING THERAPY: CPT

## 2022-02-15 PROCEDURE — 84145 PROCALCITONIN (PCT): CPT

## 2022-02-15 PROCEDURE — 25010000002 ENOXAPARIN PER 10 MG: Performed by: INTERNAL MEDICINE

## 2022-02-15 PROCEDURE — 97530 THERAPEUTIC ACTIVITIES: CPT

## 2022-02-15 PROCEDURE — 63710000001 DEXAMETHASONE PER 0.25 MG: Performed by: INTERNAL MEDICINE

## 2022-02-15 PROCEDURE — 25010000002 PIPERACILLIN SOD-TAZOBACTAM PER 1 G: Performed by: INTERNAL MEDICINE

## 2022-02-15 PROCEDURE — 85007 BL SMEAR W/DIFF WBC COUNT: CPT

## 2022-02-15 PROCEDURE — 25010000002 CALCIUM GLUCONATE PER 10 ML

## 2022-02-15 PROCEDURE — 86140 C-REACTIVE PROTEIN: CPT

## 2022-02-15 RX ORDER — FOLIC ACID 1 MG/1
1 TABLET ORAL DAILY
Status: DISCONTINUED | OUTPATIENT
Start: 2022-02-16 | End: 2022-02-24 | Stop reason: HOSPADM

## 2022-02-15 RX ORDER — CALCIUM CHLORIDE 100 MG/ML
1 INJECTION INTRAVENOUS; INTRAVENTRICULAR ONCE
Status: DISCONTINUED | OUTPATIENT
Start: 2022-02-15 | End: 2022-02-15 | Stop reason: SDUPTHER

## 2022-02-15 RX ADMIN — MEXILETINE HYDROCHLORIDE 300 MG: 150 CAPSULE ORAL at 09:22

## 2022-02-15 RX ADMIN — ZINC SULFATE 220 MG (50 MG) CAPSULE 220 MG: CAPSULE at 09:22

## 2022-02-15 RX ADMIN — AMLODIPINE BESYLATE 5 MG: 5 TABLET ORAL at 09:23

## 2022-02-15 RX ADMIN — THIAMINE HCL TAB 100 MG 300 MG: 100 TAB at 09:23

## 2022-02-15 RX ADMIN — ATORVASTATIN CALCIUM 20 MG: 10 TABLET, FILM COATED ORAL at 20:48

## 2022-02-15 RX ADMIN — ALBUTEROL SULFATE 2 PUFF: 90 AEROSOL, METERED RESPIRATORY (INHALATION) at 11:14

## 2022-02-15 RX ADMIN — TAZOBACTAM SODIUM AND PIPERACILLIN SODIUM 3.38 G: 375; 3 INJECTION, SOLUTION INTRAVENOUS at 03:06

## 2022-02-15 RX ADMIN — DOCUSATE SODIUM 50 MG AND SENNOSIDES 8.6 MG 1 TABLET: 8.6; 5 TABLET, FILM COATED ORAL at 20:48

## 2022-02-15 RX ADMIN — TAZOBACTAM SODIUM AND PIPERACILLIN SODIUM 3.38 G: 375; 3 INJECTION, SOLUTION INTRAVENOUS at 12:56

## 2022-02-15 RX ADMIN — CALCIUM GLUCONATE 1 G: 98 INJECTION, SOLUTION INTRAVENOUS at 12:55

## 2022-02-15 RX ADMIN — OXYCODONE AND ACETAMINOPHEN 1 TABLET: 325; 10 TABLET ORAL at 09:29

## 2022-02-15 RX ADMIN — FOLIC ACID 1 MG: 5 INJECTION, SOLUTION INTRAMUSCULAR; INTRAVENOUS; SUBCUTANEOUS at 11:07

## 2022-02-15 RX ADMIN — TAZOBACTAM SODIUM AND PIPERACILLIN SODIUM 3.38 G: 375; 3 INJECTION, SOLUTION INTRAVENOUS at 20:49

## 2022-02-15 RX ADMIN — DOCUSATE SODIUM 50 MG AND SENNOSIDES 8.6 MG 1 TABLET: 8.6; 5 TABLET, FILM COATED ORAL at 09:23

## 2022-02-15 RX ADMIN — SODIUM ZIRCONIUM CYCLOSILICATE 10 G: 10 POWDER, FOR SUSPENSION ORAL at 00:40

## 2022-02-15 RX ADMIN — ALBUTEROL SULFATE 2 PUFF: 90 AEROSOL, METERED RESPIRATORY (INHALATION) at 14:55

## 2022-02-15 RX ADMIN — ENOXAPARIN SODIUM 30 MG: 30 INJECTION SUBCUTANEOUS at 17:57

## 2022-02-15 RX ADMIN — OXYCODONE AND ACETAMINOPHEN 1 TABLET: 325; 10 TABLET ORAL at 14:53

## 2022-02-15 RX ADMIN — OXYCODONE AND ACETAMINOPHEN 1 TABLET: 325; 10 TABLET ORAL at 00:40

## 2022-02-15 RX ADMIN — ALBUTEROL SULFATE 2 PUFF: 90 AEROSOL, METERED RESPIRATORY (INHALATION) at 07:19

## 2022-02-15 RX ADMIN — NYSTATIN: 100000 POWDER TOPICAL at 20:49

## 2022-02-15 RX ADMIN — SODIUM CHLORIDE, PRESERVATIVE FREE 10 ML: 5 INJECTION INTRAVENOUS at 20:49

## 2022-02-15 RX ADMIN — DEXAMETHASONE 6 MG: 4 TABLET ORAL at 09:23

## 2022-02-15 RX ADMIN — SODIUM CHLORIDE, PRESERVATIVE FREE 10 ML: 5 INJECTION INTRAVENOUS at 09:23

## 2022-02-15 RX ADMIN — TRAZODONE HYDROCHLORIDE 50 MG: 50 TABLET ORAL at 20:48

## 2022-02-15 RX ADMIN — ASPIRIN 81 MG: 81 TABLET ORAL at 09:23

## 2022-02-15 RX ADMIN — NYSTATIN: 100000 POWDER TOPICAL at 09:23

## 2022-02-15 RX ADMIN — MEXILETINE HYDROCHLORIDE 300 MG: 150 CAPSULE ORAL at 20:48

## 2022-02-15 RX ADMIN — OXYCODONE HYDROCHLORIDE AND ACETAMINOPHEN 500 MG: 500 TABLET ORAL at 09:23

## 2022-02-15 RX ADMIN — OXYCODONE AND ACETAMINOPHEN 1 TABLET: 325; 10 TABLET ORAL at 20:47

## 2022-02-15 RX ADMIN — ALBUTEROL SULFATE 2 PUFF: 90 AEROSOL, METERED RESPIRATORY (INHALATION) at 18:25

## 2022-02-16 ENCOUNTER — APPOINTMENT (OUTPATIENT)
Dept: GENERAL RADIOLOGY | Facility: HOSPITAL | Age: 72
End: 2022-02-16

## 2022-02-16 PROBLEM — E53.8 FOLATE DEFICIENCY: Status: ACTIVE | Noted: 2022-02-16

## 2022-02-16 PROBLEM — D50.9 IRON DEFICIENCY ANEMIA: Status: ACTIVE | Noted: 2022-02-16

## 2022-02-16 LAB
ALBUMIN SERPL-MCNC: 2.5 G/DL (ref 3.5–5.2)
ALBUMIN/GLOB SERPL: 1.5 G/DL
ALP SERPL-CCNC: 91 U/L (ref 39–117)
ALT SERPL W P-5'-P-CCNC: 14 U/L (ref 1–41)
ANION GAP SERPL CALCULATED.3IONS-SCNC: 3 MMOL/L (ref 5–15)
ANISOCYTOSIS BLD QL: ABNORMAL
AST SERPL-CCNC: 13 U/L (ref 1–40)
BACTERIA SPEC AEROBE CULT: NO GROWTH
BILIRUB SERPL-MCNC: 0.3 MG/DL (ref 0–1.2)
BUN SERPL-MCNC: 23 MG/DL (ref 8–23)
BUN/CREAT SERPL: 24.2 (ref 7–25)
CA-I BLD-MCNC: 4.55 MG/DL (ref 4.6–5.4)
CALCIUM SPEC-SCNC: 7.2 MG/DL (ref 8.6–10.5)
CHLORIDE SERPL-SCNC: 104 MMOL/L (ref 98–107)
CO2 SERPL-SCNC: 28 MMOL/L (ref 22–29)
CREAT SERPL-MCNC: 0.95 MG/DL (ref 0.76–1.27)
CRP SERPL-MCNC: 0.99 MG/DL (ref 0–0.5)
D-LACTATE SERPL-SCNC: 1.4 MMOL/L (ref 0.5–2)
DEPRECATED RDW RBC AUTO: 50 FL (ref 37–54)
ERYTHROCYTE [DISTWIDTH] IN BLOOD BY AUTOMATED COUNT: 14.8 % (ref 12.3–15.4)
GFR SERPL CREATININE-BSD FRML MDRD: 78 ML/MIN/1.73
GIANT PLATELETS: ABNORMAL
GLOBULIN UR ELPH-MCNC: 1.7 GM/DL
GLUCOSE SERPL-MCNC: 73 MG/DL (ref 65–99)
HCT VFR BLD AUTO: 25.1 % (ref 37.5–51)
HGB BLD-MCNC: 8.3 G/DL (ref 13–17.7)
LYMPHOCYTES # BLD MANUAL: 1.68 10*3/MM3 (ref 0.7–3.1)
LYMPHOCYTES NFR BLD MANUAL: 6.3 % (ref 5–12)
Lab: ABNORMAL
MCH RBC QN AUTO: 31.6 PG (ref 26.6–33)
MCHC RBC AUTO-ENTMCNC: 33.1 G/DL (ref 31.5–35.7)
MCV RBC AUTO: 95.4 FL (ref 79–97)
METAMYELOCYTES NFR BLD MANUAL: 1 % (ref 0–0)
MONOCYTES # BLD: 0.39 10*3/MM3 (ref 0.1–0.9)
MYELOCYTES NFR BLD MANUAL: 2.1 % (ref 0–0)
NEUTROPHILS # BLD AUTO: 3.95 10*3/MM3 (ref 1.7–7)
NEUTROPHILS NFR BLD MANUAL: 62.5 % (ref 42.7–76)
NEUTS BAND NFR BLD MANUAL: 1 % (ref 0–5)
PLATELET # BLD AUTO: 172 10*3/MM3 (ref 140–450)
PMV BLD AUTO: 9.2 FL (ref 6–12)
POLYCHROMASIA BLD QL SMEAR: ABNORMAL
POTASSIUM SERPL-SCNC: 4.8 MMOL/L (ref 3.5–5.2)
PROCALCITONIN SERPL-MCNC: 1.23 NG/ML (ref 0–0.25)
PROT SERPL-MCNC: 4.2 G/DL (ref 6–8.5)
RBC # BLD AUTO: 2.63 10*6/MM3 (ref 4.14–5.8)
SODIUM SERPL-SCNC: 135 MMOL/L (ref 136–145)
TSH SERPL DL<=0.05 MIU/L-ACNC: 2.47 UIU/ML (ref 0.27–4.2)
VARIANT LYMPHS NFR BLD MANUAL: 27.1 % (ref 19.6–45.3)
WBC MORPH BLD: NORMAL
WBC NRBC COR # BLD: 6.21 10*3/MM3 (ref 3.4–10.8)

## 2022-02-16 PROCEDURE — 97530 THERAPEUTIC ACTIVITIES: CPT

## 2022-02-16 PROCEDURE — 85007 BL SMEAR W/DIFF WBC COUNT: CPT

## 2022-02-16 PROCEDURE — 84443 ASSAY THYROID STIM HORMONE: CPT

## 2022-02-16 PROCEDURE — 63710000001 DEXAMETHASONE PER 0.25 MG: Performed by: INTERNAL MEDICINE

## 2022-02-16 PROCEDURE — 86140 C-REACTIVE PROTEIN: CPT

## 2022-02-16 PROCEDURE — 80053 COMPREHEN METABOLIC PANEL: CPT

## 2022-02-16 PROCEDURE — 25010000002 CALCIUM GLUCONATE PER 10 ML

## 2022-02-16 PROCEDURE — 71045 X-RAY EXAM CHEST 1 VIEW: CPT

## 2022-02-16 PROCEDURE — 82330 ASSAY OF CALCIUM: CPT

## 2022-02-16 PROCEDURE — 25010000002 PIPERACILLIN SOD-TAZOBACTAM PER 1 G: Performed by: INTERNAL MEDICINE

## 2022-02-16 PROCEDURE — 97110 THERAPEUTIC EXERCISES: CPT

## 2022-02-16 PROCEDURE — 97535 SELF CARE MNGMENT TRAINING: CPT

## 2022-02-16 PROCEDURE — 85025 COMPLETE CBC W/AUTO DIFF WBC: CPT

## 2022-02-16 PROCEDURE — 94799 UNLISTED PULMONARY SVC/PX: CPT

## 2022-02-16 PROCEDURE — 83605 ASSAY OF LACTIC ACID: CPT

## 2022-02-16 PROCEDURE — 84145 PROCALCITONIN (PCT): CPT

## 2022-02-16 PROCEDURE — 25010000002 ENOXAPARIN PER 10 MG: Performed by: INTERNAL MEDICINE

## 2022-02-16 RX ORDER — FLUOXETINE HYDROCHLORIDE 20 MG/1
20 CAPSULE ORAL DAILY
Status: DISCONTINUED | OUTPATIENT
Start: 2022-02-16 | End: 2022-02-16

## 2022-02-16 RX ORDER — FLUOXETINE HYDROCHLORIDE 20 MG/1
20 CAPSULE ORAL EVERY 12 HOURS SCHEDULED
Status: DISCONTINUED | OUTPATIENT
Start: 2022-02-16 | End: 2022-02-24 | Stop reason: HOSPADM

## 2022-02-16 RX ORDER — ASCORBIC ACID 500 MG
1000 TABLET ORAL 2 TIMES DAILY
Status: DISCONTINUED | OUTPATIENT
Start: 2022-02-16 | End: 2022-02-22

## 2022-02-16 RX ADMIN — NYSTATIN: 100000 POWDER TOPICAL at 11:10

## 2022-02-16 RX ADMIN — DEXAMETHASONE 6 MG: 4 TABLET ORAL at 11:10

## 2022-02-16 RX ADMIN — ASPIRIN 81 MG: 81 TABLET ORAL at 11:10

## 2022-02-16 RX ADMIN — CALCIUM GLUCONATE 1 G: 98 INJECTION, SOLUTION INTRAVENOUS at 11:10

## 2022-02-16 RX ADMIN — OXYCODONE AND ACETAMINOPHEN 1 TABLET: 325; 10 TABLET ORAL at 06:14

## 2022-02-16 RX ADMIN — DOCUSATE SODIUM 50 MG AND SENNOSIDES 8.6 MG 1 TABLET: 8.6; 5 TABLET, FILM COATED ORAL at 11:09

## 2022-02-16 RX ADMIN — SODIUM CHLORIDE, PRESERVATIVE FREE 10 ML: 5 INJECTION INTRAVENOUS at 11:10

## 2022-02-16 RX ADMIN — TAZOBACTAM SODIUM AND PIPERACILLIN SODIUM 3.38 G: 375; 3 INJECTION, SOLUTION INTRAVENOUS at 16:08

## 2022-02-16 RX ADMIN — TRAZODONE HYDROCHLORIDE 50 MG: 50 TABLET ORAL at 20:32

## 2022-02-16 RX ADMIN — AMLODIPINE BESYLATE 5 MG: 5 TABLET ORAL at 11:10

## 2022-02-16 RX ADMIN — ALBUTEROL SULFATE 2 PUFF: 90 AEROSOL, METERED RESPIRATORY (INHALATION) at 14:03

## 2022-02-16 RX ADMIN — OXYCODONE HYDROCHLORIDE AND ACETAMINOPHEN 1000 MG: 500 TABLET ORAL at 20:32

## 2022-02-16 RX ADMIN — FLUOXETINE HYDROCHLORIDE 20 MG: 20 CAPSULE ORAL at 11:09

## 2022-02-16 RX ADMIN — OXYCODONE AND ACETAMINOPHEN 1 TABLET: 325; 10 TABLET ORAL at 02:19

## 2022-02-16 RX ADMIN — FOLIC ACID 1 MG: 1 TABLET ORAL at 11:10

## 2022-02-16 RX ADMIN — THIAMINE HCL TAB 100 MG 300 MG: 100 TAB at 11:10

## 2022-02-16 RX ADMIN — TAZOBACTAM SODIUM AND PIPERACILLIN SODIUM 3.38 G: 375; 3 INJECTION, SOLUTION INTRAVENOUS at 20:37

## 2022-02-16 RX ADMIN — FLUOXETINE HYDROCHLORIDE 20 MG: 20 CAPSULE ORAL at 20:32

## 2022-02-16 RX ADMIN — ALBUTEROL SULFATE 2 PUFF: 90 AEROSOL, METERED RESPIRATORY (INHALATION) at 10:00

## 2022-02-16 RX ADMIN — MEXILETINE HYDROCHLORIDE 300 MG: 150 CAPSULE ORAL at 20:30

## 2022-02-16 RX ADMIN — ATORVASTATIN CALCIUM 20 MG: 10 TABLET, FILM COATED ORAL at 20:32

## 2022-02-16 RX ADMIN — TAZOBACTAM SODIUM AND PIPERACILLIN SODIUM 3.38 G: 375; 3 INJECTION, SOLUTION INTRAVENOUS at 06:14

## 2022-02-16 RX ADMIN — DOCUSATE SODIUM 50 MG AND SENNOSIDES 8.6 MG 1 TABLET: 8.6; 5 TABLET, FILM COATED ORAL at 20:32

## 2022-02-16 RX ADMIN — MEXILETINE HYDROCHLORIDE 300 MG: 150 CAPSULE ORAL at 11:09

## 2022-02-16 RX ADMIN — ZINC SULFATE 220 MG (50 MG) CAPSULE 220 MG: CAPSULE at 11:10

## 2022-02-16 RX ADMIN — ENOXAPARIN SODIUM 30 MG: 30 INJECTION SUBCUTANEOUS at 18:14

## 2022-02-16 RX ADMIN — SODIUM CHLORIDE, PRESERVATIVE FREE 10 ML: 5 INJECTION INTRAVENOUS at 20:33

## 2022-02-16 RX ADMIN — OXYCODONE AND ACETAMINOPHEN 1 TABLET: 325; 10 TABLET ORAL at 20:30

## 2022-02-16 RX ADMIN — OXYCODONE AND ACETAMINOPHEN 1 TABLET: 325; 10 TABLET ORAL at 16:08

## 2022-02-16 RX ADMIN — NYSTATIN: 100000 POWDER TOPICAL at 20:32

## 2022-02-16 RX ADMIN — ALBUTEROL SULFATE 2 PUFF: 90 AEROSOL, METERED RESPIRATORY (INHALATION) at 06:35

## 2022-02-17 PROBLEM — N39.0 ACUTE UTI (URINARY TRACT INFECTION): Status: ACTIVE | Noted: 2022-02-17

## 2022-02-17 PROBLEM — E83.51 HYPOCALCEMIA: Status: ACTIVE | Noted: 2022-02-17

## 2022-02-17 LAB
ALBUMIN SERPL-MCNC: 2.4 G/DL (ref 3.5–5.2)
ALBUMIN/GLOB SERPL: 1.3 G/DL
ALP SERPL-CCNC: 88 U/L (ref 39–117)
ALT SERPL W P-5'-P-CCNC: 16 U/L (ref 1–41)
ANION GAP SERPL CALCULATED.3IONS-SCNC: 5 MMOL/L (ref 5–15)
AST SERPL-CCNC: 15 U/L (ref 1–40)
BASOPHILS # BLD AUTO: 0.01 10*3/MM3 (ref 0–0.2)
BASOPHILS NFR BLD AUTO: 0.1 % (ref 0–1.5)
BILIRUB SERPL-MCNC: 0.3 MG/DL (ref 0–1.2)
BUN SERPL-MCNC: 23 MG/DL (ref 8–23)
BUN/CREAT SERPL: 24.5 (ref 7–25)
CALCIUM SPEC-SCNC: 7.3 MG/DL (ref 8.6–10.5)
CHLORIDE SERPL-SCNC: 103 MMOL/L (ref 98–107)
CO2 SERPL-SCNC: 26 MMOL/L (ref 22–29)
CREAT SERPL-MCNC: 0.94 MG/DL (ref 0.76–1.27)
CRP SERPL-MCNC: 0.83 MG/DL (ref 0–0.5)
DEPRECATED RDW RBC AUTO: 51.5 FL (ref 37–54)
EOSINOPHIL # BLD AUTO: 0 10*3/MM3 (ref 0–0.4)
EOSINOPHIL NFR BLD AUTO: 0 % (ref 0.3–6.2)
ERYTHROCYTE [DISTWIDTH] IN BLOOD BY AUTOMATED COUNT: 15.1 % (ref 12.3–15.4)
GFR SERPL CREATININE-BSD FRML MDRD: 79 ML/MIN/1.73
GLOBULIN UR ELPH-MCNC: 1.8 GM/DL
GLUCOSE SERPL-MCNC: 104 MG/DL (ref 65–99)
HCT VFR BLD AUTO: 25.3 % (ref 37.5–51)
HGB BLD-MCNC: 8.3 G/DL (ref 13–17.7)
LYMPHOCYTES # BLD AUTO: 0.91 10*3/MM3 (ref 0.7–3.1)
LYMPHOCYTES NFR BLD AUTO: 13.5 % (ref 19.6–45.3)
MCH RBC QN AUTO: 31.8 PG (ref 26.6–33)
MCHC RBC AUTO-ENTMCNC: 32.8 G/DL (ref 31.5–35.7)
MCV RBC AUTO: 96.9 FL (ref 79–97)
MONOCYTES # BLD AUTO: 0.29 10*3/MM3 (ref 0.1–0.9)
MONOCYTES NFR BLD AUTO: 4.3 % (ref 5–12)
NEUTROPHILS NFR BLD AUTO: 5.13 10*3/MM3 (ref 1.7–7)
NEUTROPHILS NFR BLD AUTO: 76 % (ref 42.7–76)
PLATELET # BLD AUTO: 234 10*3/MM3 (ref 140–450)
PMV BLD AUTO: 9.6 FL (ref 6–12)
POTASSIUM SERPL-SCNC: 5.4 MMOL/L (ref 3.5–5.2)
PROCALCITONIN SERPL-MCNC: 0.8 NG/ML (ref 0–0.25)
PROT SERPL-MCNC: 4.2 G/DL (ref 6–8.5)
RBC # BLD AUTO: 2.61 10*6/MM3 (ref 4.14–5.8)
SODIUM SERPL-SCNC: 134 MMOL/L (ref 136–145)
WBC NRBC COR # BLD: 6.75 10*3/MM3 (ref 3.4–10.8)

## 2022-02-17 PROCEDURE — 80053 COMPREHEN METABOLIC PANEL: CPT

## 2022-02-17 PROCEDURE — 92526 ORAL FUNCTION THERAPY: CPT

## 2022-02-17 PROCEDURE — 25010000002 ENOXAPARIN PER 10 MG: Performed by: INTERNAL MEDICINE

## 2022-02-17 PROCEDURE — 97530 THERAPEUTIC ACTIVITIES: CPT

## 2022-02-17 PROCEDURE — 94799 UNLISTED PULMONARY SVC/PX: CPT

## 2022-02-17 PROCEDURE — 25010000002 CALCIUM GLUCONATE PER 10 ML

## 2022-02-17 PROCEDURE — 86140 C-REACTIVE PROTEIN: CPT

## 2022-02-17 PROCEDURE — 25010000002 PIPERACILLIN SOD-TAZOBACTAM PER 1 G: Performed by: INTERNAL MEDICINE

## 2022-02-17 PROCEDURE — 97535 SELF CARE MNGMENT TRAINING: CPT

## 2022-02-17 PROCEDURE — 63710000001 DEXAMETHASONE PER 0.25 MG: Performed by: INTERNAL MEDICINE

## 2022-02-17 PROCEDURE — 85025 COMPLETE CBC W/AUTO DIFF WBC: CPT

## 2022-02-17 PROCEDURE — 97110 THERAPEUTIC EXERCISES: CPT

## 2022-02-17 PROCEDURE — 84145 PROCALCITONIN (PCT): CPT

## 2022-02-17 RX ADMIN — OXYCODONE AND ACETAMINOPHEN 1 TABLET: 325; 10 TABLET ORAL at 09:04

## 2022-02-17 RX ADMIN — ALBUTEROL SULFATE 2 PUFF: 90 AEROSOL, METERED RESPIRATORY (INHALATION) at 06:19

## 2022-02-17 RX ADMIN — THIAMINE HCL TAB 100 MG 300 MG: 100 TAB at 09:04

## 2022-02-17 RX ADMIN — FOLIC ACID 1 MG: 1 TABLET ORAL at 09:04

## 2022-02-17 RX ADMIN — DEXAMETHASONE 6 MG: 4 TABLET ORAL at 09:04

## 2022-02-17 RX ADMIN — TRAZODONE HYDROCHLORIDE 50 MG: 50 TABLET ORAL at 21:44

## 2022-02-17 RX ADMIN — TAZOBACTAM SODIUM AND PIPERACILLIN SODIUM 3.38 G: 375; 3 INJECTION, SOLUTION INTRAVENOUS at 05:21

## 2022-02-17 RX ADMIN — POLYETHYLENE GLYCOL 3350 17 G: 17 POWDER, FOR SOLUTION ORAL at 09:04

## 2022-02-17 RX ADMIN — OXYCODONE HYDROCHLORIDE AND ACETAMINOPHEN 1000 MG: 500 TABLET ORAL at 21:46

## 2022-02-17 RX ADMIN — OXYCODONE AND ACETAMINOPHEN 1 TABLET: 325; 10 TABLET ORAL at 22:26

## 2022-02-17 RX ADMIN — OXYCODONE AND ACETAMINOPHEN 1 TABLET: 325; 10 TABLET ORAL at 18:22

## 2022-02-17 RX ADMIN — NYSTATIN: 100000 POWDER TOPICAL at 21:45

## 2022-02-17 RX ADMIN — MEXILETINE HYDROCHLORIDE 300 MG: 150 CAPSULE ORAL at 09:04

## 2022-02-17 RX ADMIN — ACETAMINOPHEN 650 MG: 325 TABLET, FILM COATED ORAL at 14:16

## 2022-02-17 RX ADMIN — MEXILETINE HYDROCHLORIDE 300 MG: 150 CAPSULE ORAL at 21:44

## 2022-02-17 RX ADMIN — ALBUTEROL SULFATE 2 PUFF: 90 AEROSOL, METERED RESPIRATORY (INHALATION) at 10:30

## 2022-02-17 RX ADMIN — AMLODIPINE BESYLATE 5 MG: 5 TABLET ORAL at 09:04

## 2022-02-17 RX ADMIN — OXYCODONE HYDROCHLORIDE AND ACETAMINOPHEN 1000 MG: 500 TABLET ORAL at 09:04

## 2022-02-17 RX ADMIN — DOCUSATE SODIUM 50 MG AND SENNOSIDES 8.6 MG 1 TABLET: 8.6; 5 TABLET, FILM COATED ORAL at 12:37

## 2022-02-17 RX ADMIN — TAZOBACTAM SODIUM AND PIPERACILLIN SODIUM 3.38 G: 375; 3 INJECTION, SOLUTION INTRAVENOUS at 12:37

## 2022-02-17 RX ADMIN — SODIUM CHLORIDE, PRESERVATIVE FREE 10 ML: 5 INJECTION INTRAVENOUS at 21:45

## 2022-02-17 RX ADMIN — ATORVASTATIN CALCIUM 20 MG: 10 TABLET, FILM COATED ORAL at 21:44

## 2022-02-17 RX ADMIN — OXYCODONE AND ACETAMINOPHEN 1 TABLET: 325; 10 TABLET ORAL at 13:06

## 2022-02-17 RX ADMIN — FLUOXETINE HYDROCHLORIDE 20 MG: 20 CAPSULE ORAL at 21:44

## 2022-02-17 RX ADMIN — CALCIUM GLUCONATE 1 G: 98 INJECTION, SOLUTION INTRAVENOUS at 16:12

## 2022-02-17 RX ADMIN — ALBUTEROL SULFATE 2 PUFF: 90 AEROSOL, METERED RESPIRATORY (INHALATION) at 14:28

## 2022-02-17 RX ADMIN — FLUOXETINE HYDROCHLORIDE 20 MG: 20 CAPSULE ORAL at 09:04

## 2022-02-17 RX ADMIN — ZINC SULFATE 220 MG (50 MG) CAPSULE 220 MG: CAPSULE at 09:04

## 2022-02-17 RX ADMIN — SODIUM CHLORIDE, PRESERVATIVE FREE 10 ML: 5 INJECTION INTRAVENOUS at 09:04

## 2022-02-17 RX ADMIN — ASPIRIN 81 MG: 81 TABLET ORAL at 09:04

## 2022-02-17 RX ADMIN — NYSTATIN: 100000 POWDER TOPICAL at 09:08

## 2022-02-17 RX ADMIN — ACETAMINOPHEN 650 MG: 325 TABLET, FILM COATED ORAL at 05:35

## 2022-02-17 RX ADMIN — OXYCODONE AND ACETAMINOPHEN 1 TABLET: 325; 10 TABLET ORAL at 01:06

## 2022-02-17 RX ADMIN — ALBUTEROL SULFATE 2 PUFF: 90 AEROSOL, METERED RESPIRATORY (INHALATION) at 20:06

## 2022-02-17 RX ADMIN — ENOXAPARIN SODIUM 30 MG: 30 INJECTION SUBCUTANEOUS at 16:12

## 2022-02-18 LAB
ALBUMIN SERPL-MCNC: 2.4 G/DL (ref 3.5–5.2)
ALBUMIN/GLOB SERPL: 1.3 G/DL
ALP SERPL-CCNC: 83 U/L (ref 39–117)
ALT SERPL W P-5'-P-CCNC: 15 U/L (ref 1–41)
ANION GAP SERPL CALCULATED.3IONS-SCNC: 5 MMOL/L (ref 5–15)
AST SERPL-CCNC: 12 U/L (ref 1–40)
BASOPHILS # BLD AUTO: 0.01 10*3/MM3 (ref 0–0.2)
BASOPHILS NFR BLD AUTO: 0.1 % (ref 0–1.5)
BILIRUB SERPL-MCNC: 0.3 MG/DL (ref 0–1.2)
BUN SERPL-MCNC: 21 MG/DL (ref 8–23)
BUN/CREAT SERPL: 27.6 (ref 7–25)
CALCIUM SPEC-SCNC: 7.5 MG/DL (ref 8.6–10.5)
CHLORIDE SERPL-SCNC: 101 MMOL/L (ref 98–107)
CO2 SERPL-SCNC: 25 MMOL/L (ref 22–29)
CREAT SERPL-MCNC: 0.76 MG/DL (ref 0.76–1.27)
CRP SERPL-MCNC: 0.64 MG/DL (ref 0–0.5)
DEPRECATED RDW RBC AUTO: 50.9 FL (ref 37–54)
EOSINOPHIL # BLD AUTO: 0.01 10*3/MM3 (ref 0–0.4)
EOSINOPHIL NFR BLD AUTO: 0.1 % (ref 0.3–6.2)
ERYTHROCYTE [DISTWIDTH] IN BLOOD BY AUTOMATED COUNT: 15.3 % (ref 12.3–15.4)
GFR SERPL CREATININE-BSD FRML MDRD: 101 ML/MIN/1.73
GLOBULIN UR ELPH-MCNC: 1.8 GM/DL
GLUCOSE SERPL-MCNC: 86 MG/DL (ref 65–99)
HCT VFR BLD AUTO: 25.1 % (ref 37.5–51)
HGB BLD-MCNC: 8.3 G/DL (ref 13–17.7)
IMM GRANULOCYTES # BLD AUTO: 0.27 10*3/MM3 (ref 0–0.05)
IMM GRANULOCYTES NFR BLD AUTO: 3.7 % (ref 0–0.5)
LYMPHOCYTES # BLD AUTO: 1.17 10*3/MM3 (ref 0.7–3.1)
LYMPHOCYTES NFR BLD AUTO: 16.1 % (ref 19.6–45.3)
MCH RBC QN AUTO: 31.8 PG (ref 26.6–33)
MCHC RBC AUTO-ENTMCNC: 33.1 G/DL (ref 31.5–35.7)
MCV RBC AUTO: 96.2 FL (ref 79–97)
MONOCYTES # BLD AUTO: 0.37 10*3/MM3 (ref 0.1–0.9)
MONOCYTES NFR BLD AUTO: 5.1 % (ref 5–12)
NEUTROPHILS NFR BLD AUTO: 5.45 10*3/MM3 (ref 1.7–7)
NEUTROPHILS NFR BLD AUTO: 74.9 % (ref 42.7–76)
NRBC BLD AUTO-RTO: 0 /100 WBC (ref 0–0.2)
PLATELET # BLD AUTO: 223 10*3/MM3 (ref 140–450)
PMV BLD AUTO: 8.8 FL (ref 6–12)
POTASSIUM SERPL-SCNC: 4.7 MMOL/L (ref 3.5–5.2)
PROCALCITONIN SERPL-MCNC: 0.58 NG/ML (ref 0–0.25)
PROT SERPL-MCNC: 4.2 G/DL (ref 6–8.5)
RBC # BLD AUTO: 2.61 10*6/MM3 (ref 4.14–5.8)
SODIUM SERPL-SCNC: 131 MMOL/L (ref 136–145)
WBC NRBC COR # BLD: 7.28 10*3/MM3 (ref 3.4–10.8)

## 2022-02-18 PROCEDURE — 25010000002 ENOXAPARIN PER 10 MG: Performed by: INTERNAL MEDICINE

## 2022-02-18 PROCEDURE — 92526 ORAL FUNCTION THERAPY: CPT | Performed by: SPEECH-LANGUAGE PATHOLOGIST

## 2022-02-18 PROCEDURE — 63710000001 DEXAMETHASONE PER 0.25 MG: Performed by: INTERNAL MEDICINE

## 2022-02-18 PROCEDURE — 94799 UNLISTED PULMONARY SVC/PX: CPT

## 2022-02-18 PROCEDURE — 85025 COMPLETE CBC W/AUTO DIFF WBC: CPT

## 2022-02-18 PROCEDURE — 84145 PROCALCITONIN (PCT): CPT

## 2022-02-18 PROCEDURE — 86140 C-REACTIVE PROTEIN: CPT

## 2022-02-18 PROCEDURE — 80053 COMPREHEN METABOLIC PANEL: CPT

## 2022-02-18 PROCEDURE — 97116 GAIT TRAINING THERAPY: CPT

## 2022-02-18 PROCEDURE — 97535 SELF CARE MNGMENT TRAINING: CPT

## 2022-02-18 RX ADMIN — FLUOXETINE HYDROCHLORIDE 20 MG: 20 CAPSULE ORAL at 08:33

## 2022-02-18 RX ADMIN — OXYCODONE AND ACETAMINOPHEN 1 TABLET: 325; 10 TABLET ORAL at 19:17

## 2022-02-18 RX ADMIN — DEXAMETHASONE 6 MG: 4 TABLET ORAL at 08:33

## 2022-02-18 RX ADMIN — OXYCODONE HYDROCHLORIDE AND ACETAMINOPHEN 1000 MG: 500 TABLET ORAL at 20:52

## 2022-02-18 RX ADMIN — NYSTATIN 1 APPLICATION: 100000 POWDER TOPICAL at 20:52

## 2022-02-18 RX ADMIN — ASPIRIN 81 MG: 81 TABLET ORAL at 08:34

## 2022-02-18 RX ADMIN — MEXILETINE HYDROCHLORIDE 300 MG: 150 CAPSULE ORAL at 20:52

## 2022-02-18 RX ADMIN — ALBUTEROL SULFATE 2 PUFF: 90 AEROSOL, METERED RESPIRATORY (INHALATION) at 06:33

## 2022-02-18 RX ADMIN — ALBUTEROL SULFATE 2 PUFF: 90 AEROSOL, METERED RESPIRATORY (INHALATION) at 10:00

## 2022-02-18 RX ADMIN — OXYCODONE AND ACETAMINOPHEN 1 TABLET: 325; 10 TABLET ORAL at 11:00

## 2022-02-18 RX ADMIN — ZINC SULFATE 220 MG (50 MG) CAPSULE 220 MG: CAPSULE at 08:33

## 2022-02-18 RX ADMIN — NYSTATIN: 100000 POWDER TOPICAL at 08:42

## 2022-02-18 RX ADMIN — DOCUSATE SODIUM 50 MG AND SENNOSIDES 8.6 MG 1 TABLET: 8.6; 5 TABLET, FILM COATED ORAL at 00:02

## 2022-02-18 RX ADMIN — AMLODIPINE BESYLATE 5 MG: 5 TABLET ORAL at 08:33

## 2022-02-18 RX ADMIN — ALBUTEROL SULFATE 2 PUFF: 90 AEROSOL, METERED RESPIRATORY (INHALATION) at 19:58

## 2022-02-18 RX ADMIN — SODIUM CHLORIDE, PRESERVATIVE FREE 10 ML: 5 INJECTION INTRAVENOUS at 08:41

## 2022-02-18 RX ADMIN — ACETAMINOPHEN 650 MG: 325 TABLET, FILM COATED ORAL at 09:23

## 2022-02-18 RX ADMIN — OXYCODONE AND ACETAMINOPHEN 1 TABLET: 325; 10 TABLET ORAL at 15:04

## 2022-02-18 RX ADMIN — DOCUSATE SODIUM 50 MG AND SENNOSIDES 8.6 MG 1 TABLET: 8.6; 5 TABLET, FILM COATED ORAL at 08:33

## 2022-02-18 RX ADMIN — OXYCODONE HYDROCHLORIDE AND ACETAMINOPHEN 1000 MG: 500 TABLET ORAL at 08:33

## 2022-02-18 RX ADMIN — ENOXAPARIN SODIUM 30 MG: 30 INJECTION SUBCUTANEOUS at 16:21

## 2022-02-18 RX ADMIN — OXYCODONE AND ACETAMINOPHEN 1 TABLET: 325; 10 TABLET ORAL at 07:18

## 2022-02-18 RX ADMIN — TRAZODONE HYDROCHLORIDE 50 MG: 50 TABLET ORAL at 20:52

## 2022-02-18 RX ADMIN — DOCUSATE SODIUM 50 MG AND SENNOSIDES 8.6 MG 1 TABLET: 8.6; 5 TABLET, FILM COATED ORAL at 20:52

## 2022-02-18 RX ADMIN — FLUOXETINE HYDROCHLORIDE 20 MG: 20 CAPSULE ORAL at 20:52

## 2022-02-18 RX ADMIN — FOLIC ACID 1 MG: 1 TABLET ORAL at 08:33

## 2022-02-18 RX ADMIN — SODIUM CHLORIDE, PRESERVATIVE FREE 10 ML: 5 INJECTION INTRAVENOUS at 20:52

## 2022-02-18 RX ADMIN — ACETAMINOPHEN 650 MG: 325 TABLET, FILM COATED ORAL at 17:34

## 2022-02-18 RX ADMIN — MEXILETINE HYDROCHLORIDE 300 MG: 150 CAPSULE ORAL at 08:34

## 2022-02-18 RX ADMIN — ACETAMINOPHEN 650 MG: 325 TABLET, FILM COATED ORAL at 00:02

## 2022-02-18 RX ADMIN — ACETAMINOPHEN 650 MG: 325 TABLET, FILM COATED ORAL at 05:49

## 2022-02-18 RX ADMIN — ATORVASTATIN CALCIUM 20 MG: 10 TABLET, FILM COATED ORAL at 20:52

## 2022-02-18 RX ADMIN — ACETAMINOPHEN 650 MG: 325 TABLET, FILM COATED ORAL at 22:24

## 2022-02-18 RX ADMIN — THIAMINE HCL TAB 100 MG 300 MG: 100 TAB at 08:33

## 2022-02-18 RX ADMIN — ALBUTEROL SULFATE 2 PUFF: 90 AEROSOL, METERED RESPIRATORY (INHALATION) at 14:17

## 2022-02-18 RX ADMIN — ACETAMINOPHEN 650 MG: 325 TABLET, FILM COATED ORAL at 13:33

## 2022-02-18 RX ADMIN — OXYCODONE AND ACETAMINOPHEN 1 TABLET: 325; 10 TABLET ORAL at 02:42

## 2022-02-19 ENCOUNTER — APPOINTMENT (OUTPATIENT)
Dept: GENERAL RADIOLOGY | Facility: HOSPITAL | Age: 72
End: 2022-02-19

## 2022-02-19 PROCEDURE — 74018 RADEX ABDOMEN 1 VIEW: CPT

## 2022-02-19 PROCEDURE — 25010000002 ONDANSETRON PER 1 MG: Performed by: INTERNAL MEDICINE

## 2022-02-19 PROCEDURE — 63710000001 ONDANSETRON PER 8 MG: Performed by: INTERNAL MEDICINE

## 2022-02-19 PROCEDURE — 25010000002 ENOXAPARIN PER 10 MG: Performed by: INTERNAL MEDICINE

## 2022-02-19 PROCEDURE — 94799 UNLISTED PULMONARY SVC/PX: CPT

## 2022-02-19 PROCEDURE — 63710000001 DEXAMETHASONE PER 0.25 MG: Performed by: INTERNAL MEDICINE

## 2022-02-19 PROCEDURE — 25010000002 PROMETHAZINE PER 50 MG: Performed by: FAMILY MEDICINE

## 2022-02-19 RX ORDER — OXYCODONE HYDROCHLORIDE AND ACETAMINOPHEN 5; 325 MG/1; MG/1
1 TABLET ORAL EVERY 4 HOURS PRN
Status: DISCONTINUED | OUTPATIENT
Start: 2022-02-19 | End: 2022-02-24 | Stop reason: HOSPADM

## 2022-02-19 RX ADMIN — SODIUM CHLORIDE, POTASSIUM CHLORIDE, SODIUM LACTATE AND CALCIUM CHLORIDE 500 ML: 600; 310; 30; 20 INJECTION, SOLUTION INTRAVENOUS at 07:50

## 2022-02-19 RX ADMIN — DOCUSATE SODIUM 50 MG AND SENNOSIDES 8.6 MG 1 TABLET: 8.6; 5 TABLET, FILM COATED ORAL at 21:07

## 2022-02-19 RX ADMIN — ATORVASTATIN CALCIUM 20 MG: 10 TABLET, FILM COATED ORAL at 21:07

## 2022-02-19 RX ADMIN — OXYCODONE HYDROCHLORIDE AND ACETAMINOPHEN 1 TABLET: 5; 325 TABLET ORAL at 23:35

## 2022-02-19 RX ADMIN — OXYCODONE HYDROCHLORIDE AND ACETAMINOPHEN 1 TABLET: 5; 325 TABLET ORAL at 19:46

## 2022-02-19 RX ADMIN — PROMETHAZINE HYDROCHLORIDE 12.5 MG: 25 INJECTION INTRAMUSCULAR; INTRAVENOUS at 06:49

## 2022-02-19 RX ADMIN — ONDANSETRON 4 MG: 2 INJECTION INTRAMUSCULAR; INTRAVENOUS at 03:25

## 2022-02-19 RX ADMIN — SODIUM CHLORIDE, PRESERVATIVE FREE 10 ML: 5 INJECTION INTRAVENOUS at 21:08

## 2022-02-19 RX ADMIN — OXYCODONE HYDROCHLORIDE AND ACETAMINOPHEN 1000 MG: 500 TABLET ORAL at 21:07

## 2022-02-19 RX ADMIN — OXYCODONE HYDROCHLORIDE AND ACETAMINOPHEN 1 TABLET: 5; 325 TABLET ORAL at 14:14

## 2022-02-19 RX ADMIN — SODIUM CHLORIDE, PRESERVATIVE FREE 10 ML: 5 INJECTION INTRAVENOUS at 10:00

## 2022-02-19 RX ADMIN — NYSTATIN: 100000 POWDER TOPICAL at 09:59

## 2022-02-19 RX ADMIN — OXYCODONE AND ACETAMINOPHEN 1 TABLET: 325; 10 TABLET ORAL at 00:07

## 2022-02-19 RX ADMIN — ENOXAPARIN SODIUM 30 MG: 30 INJECTION SUBCUTANEOUS at 17:24

## 2022-02-19 RX ADMIN — TRAZODONE HYDROCHLORIDE 50 MG: 50 TABLET ORAL at 21:07

## 2022-02-19 RX ADMIN — MEXILETINE HYDROCHLORIDE 300 MG: 150 CAPSULE ORAL at 21:07

## 2022-02-19 RX ADMIN — FLUOXETINE HYDROCHLORIDE 20 MG: 20 CAPSULE ORAL at 21:07

## 2022-02-19 RX ADMIN — ALBUTEROL SULFATE 2 PUFF: 90 AEROSOL, METERED RESPIRATORY (INHALATION) at 14:17

## 2022-02-19 RX ADMIN — ALBUTEROL SULFATE 2 PUFF: 90 AEROSOL, METERED RESPIRATORY (INHALATION) at 18:56

## 2022-02-19 RX ADMIN — NYSTATIN: 100000 POWDER TOPICAL at 21:07

## 2022-02-19 RX ADMIN — ONDANSETRON 4 MG: 4 TABLET, FILM COATED ORAL at 23:35

## 2022-02-20 LAB
ABO GROUP BLD: NORMAL
ANION GAP SERPL CALCULATED.3IONS-SCNC: 3 MMOL/L (ref 5–15)
BLD GP AB SCN SERPL QL: NEGATIVE
BUN SERPL-MCNC: 74 MG/DL (ref 8–23)
BUN/CREAT SERPL: 69.2 (ref 7–25)
CALCIUM SPEC-SCNC: 7.1 MG/DL (ref 8.6–10.5)
CHLORIDE SERPL-SCNC: 105 MMOL/L (ref 98–107)
CO2 SERPL-SCNC: 25 MMOL/L (ref 22–29)
CREAT SERPL-MCNC: 1.07 MG/DL (ref 0.76–1.27)
DEPRECATED RDW RBC AUTO: 58.3 FL (ref 37–54)
ERYTHROCYTE [DISTWIDTH] IN BLOOD BY AUTOMATED COUNT: 16.6 % (ref 12.3–15.4)
GFR SERPL CREATININE-BSD FRML MDRD: 68 ML/MIN/1.73
GLUCOSE SERPL-MCNC: 83 MG/DL (ref 65–99)
HCT VFR BLD AUTO: 19.9 % (ref 37.5–51)
HCT VFR BLD AUTO: 22 % (ref 37.5–51)
HCT VFR BLD AUTO: 25.2 % (ref 37.5–51)
HGB BLD-MCNC: 6.2 G/DL (ref 13–17.7)
HGB BLD-MCNC: 6.8 G/DL (ref 13–17.7)
HGB BLD-MCNC: 8.1 G/DL (ref 13–17.7)
MCH RBC QN AUTO: 30.7 PG (ref 26.6–33)
MCHC RBC AUTO-ENTMCNC: 31.2 G/DL (ref 31.5–35.7)
MCV RBC AUTO: 98.5 FL (ref 79–97)
PLATELET # BLD AUTO: 355 10*3/MM3 (ref 140–450)
PMV BLD AUTO: 9.8 FL (ref 6–12)
POTASSIUM SERPL-SCNC: 4.8 MMOL/L (ref 3.5–5.2)
RBC # BLD AUTO: 2.02 10*6/MM3 (ref 4.14–5.8)
RH BLD: POSITIVE
SODIUM SERPL-SCNC: 133 MMOL/L (ref 136–145)
T&S EXPIRATION DATE: NORMAL
WBC NRBC COR # BLD: 9.97 10*3/MM3 (ref 3.4–10.8)

## 2022-02-20 PROCEDURE — 80048 BASIC METABOLIC PNL TOTAL CA: CPT | Performed by: INTERNAL MEDICINE

## 2022-02-20 PROCEDURE — 86900 BLOOD TYPING SEROLOGIC ABO: CPT

## 2022-02-20 PROCEDURE — 86923 COMPATIBILITY TEST ELECTRIC: CPT

## 2022-02-20 PROCEDURE — 36430 TRANSFUSION BLD/BLD COMPNT: CPT

## 2022-02-20 PROCEDURE — 85027 COMPLETE CBC AUTOMATED: CPT | Performed by: INTERNAL MEDICINE

## 2022-02-20 PROCEDURE — P9016 RBC LEUKOCYTES REDUCED: HCPCS

## 2022-02-20 PROCEDURE — 85014 HEMATOCRIT: CPT | Performed by: INTERNAL MEDICINE

## 2022-02-20 PROCEDURE — 94799 UNLISTED PULMONARY SVC/PX: CPT

## 2022-02-20 PROCEDURE — 86900 BLOOD TYPING SEROLOGIC ABO: CPT | Performed by: INTERNAL MEDICINE

## 2022-02-20 PROCEDURE — 86850 RBC ANTIBODY SCREEN: CPT | Performed by: INTERNAL MEDICINE

## 2022-02-20 PROCEDURE — 85018 HEMOGLOBIN: CPT | Performed by: INTERNAL MEDICINE

## 2022-02-20 PROCEDURE — 86901 BLOOD TYPING SEROLOGIC RH(D): CPT

## 2022-02-20 PROCEDURE — 86901 BLOOD TYPING SEROLOGIC RH(D): CPT | Performed by: INTERNAL MEDICINE

## 2022-02-20 PROCEDURE — 97530 THERAPEUTIC ACTIVITIES: CPT

## 2022-02-20 RX ORDER — PANTOPRAZOLE SODIUM 40 MG/10ML
40 INJECTION, POWDER, LYOPHILIZED, FOR SOLUTION INTRAVENOUS
Status: DISCONTINUED | OUTPATIENT
Start: 2022-02-20 | End: 2022-02-20

## 2022-02-20 RX ADMIN — ALBUTEROL SULFATE 2 PUFF: 90 AEROSOL, METERED RESPIRATORY (INHALATION) at 19:48

## 2022-02-20 RX ADMIN — SODIUM CHLORIDE, PRESERVATIVE FREE 10 ML: 5 INJECTION INTRAVENOUS at 09:28

## 2022-02-20 RX ADMIN — NYSTATIN: 100000 POWDER TOPICAL at 20:48

## 2022-02-20 RX ADMIN — ACETAMINOPHEN 650 MG: 325 TABLET, FILM COATED ORAL at 20:48

## 2022-02-20 RX ADMIN — TRAZODONE HYDROCHLORIDE 50 MG: 50 TABLET ORAL at 20:48

## 2022-02-20 RX ADMIN — OXYCODONE HYDROCHLORIDE AND ACETAMINOPHEN 1000 MG: 500 TABLET ORAL at 09:19

## 2022-02-20 RX ADMIN — ALBUTEROL SULFATE 2 PUFF: 90 AEROSOL, METERED RESPIRATORY (INHALATION) at 15:02

## 2022-02-20 RX ADMIN — PANTOPRAZOLE SODIUM 8 MG/HR: 40 INJECTION, POWDER, FOR SOLUTION INTRAVENOUS at 12:13

## 2022-02-20 RX ADMIN — DOCUSATE SODIUM 50 MG AND SENNOSIDES 8.6 MG 1 TABLET: 8.6; 5 TABLET, FILM COATED ORAL at 09:19

## 2022-02-20 RX ADMIN — OXYCODONE HYDROCHLORIDE AND ACETAMINOPHEN 1 TABLET: 5; 325 TABLET ORAL at 19:12

## 2022-02-20 RX ADMIN — ASPIRIN 81 MG: 81 TABLET ORAL at 09:19

## 2022-02-20 RX ADMIN — MEXILETINE HYDROCHLORIDE 300 MG: 150 CAPSULE ORAL at 20:48

## 2022-02-20 RX ADMIN — OXYCODONE HYDROCHLORIDE AND ACETAMINOPHEN 1 TABLET: 5; 325 TABLET ORAL at 11:16

## 2022-02-20 RX ADMIN — MEXILETINE HYDROCHLORIDE 300 MG: 150 CAPSULE ORAL at 09:20

## 2022-02-20 RX ADMIN — OXYCODONE HYDROCHLORIDE AND ACETAMINOPHEN 1000 MG: 500 TABLET ORAL at 20:48

## 2022-02-20 RX ADMIN — ZINC SULFATE 220 MG (50 MG) CAPSULE 220 MG: CAPSULE at 09:19

## 2022-02-20 RX ADMIN — ALBUTEROL SULFATE 2 PUFF: 90 AEROSOL, METERED RESPIRATORY (INHALATION) at 07:37

## 2022-02-20 RX ADMIN — OXYCODONE HYDROCHLORIDE AND ACETAMINOPHEN 1 TABLET: 5; 325 TABLET ORAL at 07:03

## 2022-02-20 RX ADMIN — NYSTATIN: 100000 POWDER TOPICAL at 09:19

## 2022-02-20 RX ADMIN — PANTOPRAZOLE SODIUM 40 MG: 40 INJECTION, POWDER, FOR SOLUTION INTRAVENOUS at 09:28

## 2022-02-20 RX ADMIN — FOLIC ACID 1 MG: 1 TABLET ORAL at 09:19

## 2022-02-20 RX ADMIN — POLYETHYLENE GLYCOL 3350 17 G: 17 POWDER, FOR SOLUTION ORAL at 09:19

## 2022-02-20 RX ADMIN — DOCUSATE SODIUM 50 MG AND SENNOSIDES 8.6 MG 1 TABLET: 8.6; 5 TABLET, FILM COATED ORAL at 20:48

## 2022-02-20 RX ADMIN — ATORVASTATIN CALCIUM 20 MG: 10 TABLET, FILM COATED ORAL at 20:48

## 2022-02-20 RX ADMIN — PANTOPRAZOLE SODIUM 8 MG/HR: 40 INJECTION, POWDER, FOR SOLUTION INTRAVENOUS at 16:54

## 2022-02-20 RX ADMIN — ALBUTEROL SULFATE 2 PUFF: 90 AEROSOL, METERED RESPIRATORY (INHALATION) at 11:15

## 2022-02-20 RX ADMIN — FLUOXETINE HYDROCHLORIDE 20 MG: 20 CAPSULE ORAL at 20:48

## 2022-02-20 RX ADMIN — PANTOPRAZOLE SODIUM 8 MG/HR: 40 INJECTION, POWDER, FOR SOLUTION INTRAVENOUS at 21:34

## 2022-02-20 RX ADMIN — FLUOXETINE HYDROCHLORIDE 20 MG: 20 CAPSULE ORAL at 09:19

## 2022-02-20 RX ADMIN — THIAMINE HCL TAB 100 MG 300 MG: 100 TAB at 09:20

## 2022-02-20 RX ADMIN — OXYCODONE HYDROCHLORIDE AND ACETAMINOPHEN 1 TABLET: 5; 325 TABLET ORAL at 22:48

## 2022-02-20 RX ADMIN — OXYCODONE HYDROCHLORIDE AND ACETAMINOPHEN 1 TABLET: 5; 325 TABLET ORAL at 14:52

## 2022-02-20 RX ADMIN — SODIUM CHLORIDE, PRESERVATIVE FREE 10 ML: 5 INJECTION INTRAVENOUS at 20:49

## 2022-02-21 LAB
ALBUMIN SERPL-MCNC: 2.1 G/DL (ref 3.5–5.2)
ALBUMIN/GLOB SERPL: 1.5 G/DL
ALP SERPL-CCNC: 60 U/L (ref 39–117)
ALT SERPL W P-5'-P-CCNC: 15 U/L (ref 1–41)
ANION GAP SERPL CALCULATED.3IONS-SCNC: 4 MMOL/L (ref 5–15)
AST SERPL-CCNC: 16 U/L (ref 1–40)
BH BB BLOOD EXPIRATION DATE: NORMAL
BH BB BLOOD EXPIRATION DATE: NORMAL
BH BB BLOOD TYPE BARCODE: 6200
BH BB BLOOD TYPE BARCODE: 6200
BH BB DISPENSE STATUS: NORMAL
BH BB DISPENSE STATUS: NORMAL
BH BB PRODUCT CODE: NORMAL
BH BB PRODUCT CODE: NORMAL
BH BB UNIT NUMBER: NORMAL
BH BB UNIT NUMBER: NORMAL
BILIRUB SERPL-MCNC: 0.3 MG/DL (ref 0–1.2)
BUN SERPL-MCNC: 50 MG/DL (ref 8–23)
BUN/CREAT SERPL: 60.2 (ref 7–25)
CALCIUM SPEC-SCNC: 6.9 MG/DL (ref 8.6–10.5)
CHLORIDE SERPL-SCNC: 108 MMOL/L (ref 98–107)
CO2 SERPL-SCNC: 24 MMOL/L (ref 22–29)
CREAT SERPL-MCNC: 0.83 MG/DL (ref 0.76–1.27)
CROSSMATCH INTERPRETATION: NORMAL
CROSSMATCH INTERPRETATION: NORMAL
DEPRECATED RDW RBC AUTO: 58 FL (ref 37–54)
ERYTHROCYTE [DISTWIDTH] IN BLOOD BY AUTOMATED COUNT: 17.4 % (ref 12.3–15.4)
GFR SERPL CREATININE-BSD FRML MDRD: 91 ML/MIN/1.73
GLOBULIN UR ELPH-MCNC: 1.4 GM/DL
GLUCOSE SERPL-MCNC: 96 MG/DL (ref 65–99)
HCT VFR BLD AUTO: 24.4 % (ref 37.5–51)
HEMOCCULT STL QL: POSITIVE
HGB BLD-MCNC: 8 G/DL (ref 13–17.7)
MCH RBC QN AUTO: 30.9 PG (ref 26.6–33)
MCHC RBC AUTO-ENTMCNC: 32.8 G/DL (ref 31.5–35.7)
MCV RBC AUTO: 94.2 FL (ref 79–97)
PLATELET # BLD AUTO: 218 10*3/MM3 (ref 140–450)
PMV BLD AUTO: 9.4 FL (ref 6–12)
POTASSIUM SERPL-SCNC: 4.8 MMOL/L (ref 3.5–5.2)
PROT SERPL-MCNC: 3.5 G/DL (ref 6–8.5)
RBC # BLD AUTO: 2.59 10*6/MM3 (ref 4.14–5.8)
SODIUM SERPL-SCNC: 136 MMOL/L (ref 136–145)
UNIT  ABO: NORMAL
UNIT  ABO: NORMAL
UNIT  RH: NORMAL
UNIT  RH: NORMAL
WBC NRBC COR # BLD: 5.71 10*3/MM3 (ref 3.4–10.8)

## 2022-02-21 PROCEDURE — 94799 UNLISTED PULMONARY SVC/PX: CPT

## 2022-02-21 PROCEDURE — 97168 OT RE-EVAL EST PLAN CARE: CPT | Performed by: OCCUPATIONAL THERAPIST

## 2022-02-21 PROCEDURE — 80053 COMPREHEN METABOLIC PANEL: CPT | Performed by: INTERNAL MEDICINE

## 2022-02-21 PROCEDURE — 92526 ORAL FUNCTION THERAPY: CPT | Performed by: SPEECH-LANGUAGE PATHOLOGIST

## 2022-02-21 PROCEDURE — 99222 1ST HOSP IP/OBS MODERATE 55: CPT | Performed by: NURSE PRACTITIONER

## 2022-02-21 PROCEDURE — 82272 OCCULT BLD FECES 1-3 TESTS: CPT | Performed by: INTERNAL MEDICINE

## 2022-02-21 PROCEDURE — 85027 COMPLETE CBC AUTOMATED: CPT | Performed by: INTERNAL MEDICINE

## 2022-02-21 PROCEDURE — 97535 SELF CARE MNGMENT TRAINING: CPT | Performed by: OCCUPATIONAL THERAPIST

## 2022-02-21 PROCEDURE — 97116 GAIT TRAINING THERAPY: CPT

## 2022-02-21 RX ADMIN — ACETAMINOPHEN 650 MG: 325 TABLET, FILM COATED ORAL at 05:26

## 2022-02-21 RX ADMIN — DOCUSATE SODIUM 50 MG AND SENNOSIDES 8.6 MG 1 TABLET: 8.6; 5 TABLET, FILM COATED ORAL at 21:02

## 2022-02-21 RX ADMIN — MEXILETINE HYDROCHLORIDE 300 MG: 150 CAPSULE ORAL at 08:38

## 2022-02-21 RX ADMIN — TRAZODONE HYDROCHLORIDE 50 MG: 50 TABLET ORAL at 21:01

## 2022-02-21 RX ADMIN — FOLIC ACID 1 MG: 1 TABLET ORAL at 08:38

## 2022-02-21 RX ADMIN — OXYCODONE HYDROCHLORIDE AND ACETAMINOPHEN 1 TABLET: 5; 325 TABLET ORAL at 02:50

## 2022-02-21 RX ADMIN — FLUOXETINE HYDROCHLORIDE 20 MG: 20 CAPSULE ORAL at 08:38

## 2022-02-21 RX ADMIN — MEXILETINE HYDROCHLORIDE 300 MG: 150 CAPSULE ORAL at 21:01

## 2022-02-21 RX ADMIN — THIAMINE HCL TAB 100 MG 300 MG: 100 TAB at 08:37

## 2022-02-21 RX ADMIN — DOCUSATE SODIUM 50 MG AND SENNOSIDES 8.6 MG 1 TABLET: 8.6; 5 TABLET, FILM COATED ORAL at 08:38

## 2022-02-21 RX ADMIN — OXYCODONE HYDROCHLORIDE AND ACETAMINOPHEN 1 TABLET: 5; 325 TABLET ORAL at 12:51

## 2022-02-21 RX ADMIN — SODIUM CHLORIDE, PRESERVATIVE FREE 10 ML: 5 INJECTION INTRAVENOUS at 21:02

## 2022-02-21 RX ADMIN — OXYCODONE HYDROCHLORIDE AND ACETAMINOPHEN 1000 MG: 500 TABLET ORAL at 08:38

## 2022-02-21 RX ADMIN — PANTOPRAZOLE SODIUM 8 MG/HR: 40 INJECTION, POWDER, FOR SOLUTION INTRAVENOUS at 19:46

## 2022-02-21 RX ADMIN — NYSTATIN: 100000 POWDER TOPICAL at 21:02

## 2022-02-21 RX ADMIN — ALBUTEROL SULFATE 2 PUFF: 90 AEROSOL, METERED RESPIRATORY (INHALATION) at 13:30

## 2022-02-21 RX ADMIN — SODIUM CHLORIDE, PRESERVATIVE FREE 10 ML: 5 INJECTION INTRAVENOUS at 08:38

## 2022-02-21 RX ADMIN — PANTOPRAZOLE SODIUM 8 MG/HR: 40 INJECTION, POWDER, FOR SOLUTION INTRAVENOUS at 03:00

## 2022-02-21 RX ADMIN — ALBUTEROL SULFATE 2 PUFF: 90 AEROSOL, METERED RESPIRATORY (INHALATION) at 06:46

## 2022-02-21 RX ADMIN — OXYCODONE HYDROCHLORIDE AND ACETAMINOPHEN 1 TABLET: 5; 325 TABLET ORAL at 08:39

## 2022-02-21 RX ADMIN — OXYCODONE HYDROCHLORIDE AND ACETAMINOPHEN 1000 MG: 500 TABLET ORAL at 21:02

## 2022-02-21 RX ADMIN — FLUOXETINE HYDROCHLORIDE 20 MG: 20 CAPSULE ORAL at 21:02

## 2022-02-21 RX ADMIN — ALBUTEROL SULFATE 2 PUFF: 90 AEROSOL, METERED RESPIRATORY (INHALATION) at 09:33

## 2022-02-21 RX ADMIN — OXYCODONE HYDROCHLORIDE AND ACETAMINOPHEN 1 TABLET: 5; 325 TABLET ORAL at 17:05

## 2022-02-21 RX ADMIN — ATORVASTATIN CALCIUM 20 MG: 10 TABLET, FILM COATED ORAL at 21:01

## 2022-02-21 RX ADMIN — OXYCODONE HYDROCHLORIDE AND ACETAMINOPHEN 1 TABLET: 5; 325 TABLET ORAL at 21:02

## 2022-02-21 RX ADMIN — PANTOPRAZOLE SODIUM 8 MG/HR: 40 INJECTION, POWDER, FOR SOLUTION INTRAVENOUS at 08:37

## 2022-02-21 RX ADMIN — NYSTATIN: 100000 POWDER TOPICAL at 08:39

## 2022-02-21 RX ADMIN — ALBUTEROL SULFATE 2 PUFF: 90 AEROSOL, METERED RESPIRATORY (INHALATION) at 19:10

## 2022-02-21 RX ADMIN — PANTOPRAZOLE SODIUM 8 MG/HR: 40 INJECTION, POWDER, FOR SOLUTION INTRAVENOUS at 14:45

## 2022-02-21 RX ADMIN — ACETAMINOPHEN 650 MG: 325 TABLET, FILM COATED ORAL at 00:38

## 2022-02-21 RX ADMIN — ZINC SULFATE 220 MG (50 MG) CAPSULE 220 MG: CAPSULE at 08:37

## 2022-02-22 ENCOUNTER — ANESTHESIA (OUTPATIENT)
Dept: GASTROENTEROLOGY | Facility: HOSPITAL | Age: 72
End: 2022-02-22

## 2022-02-22 ENCOUNTER — ANESTHESIA EVENT (OUTPATIENT)
Dept: GASTROENTEROLOGY | Facility: HOSPITAL | Age: 72
End: 2022-02-22

## 2022-02-22 LAB
ALBUMIN SERPL-MCNC: 2.3 G/DL (ref 3.5–5.2)
ALBUMIN/GLOB SERPL: 1.5 G/DL
ALP SERPL-CCNC: 67 U/L (ref 39–117)
ALT SERPL W P-5'-P-CCNC: 14 U/L (ref 1–41)
ANION GAP SERPL CALCULATED.3IONS-SCNC: 3 MMOL/L (ref 5–15)
AST SERPL-CCNC: 14 U/L (ref 1–40)
BILIRUB SERPL-MCNC: 0.3 MG/DL (ref 0–1.2)
BUN SERPL-MCNC: 33 MG/DL (ref 8–23)
BUN/CREAT SERPL: 47.8 (ref 7–25)
CALCIUM SPEC-SCNC: 7.4 MG/DL (ref 8.6–10.5)
CHLORIDE SERPL-SCNC: 110 MMOL/L (ref 98–107)
CO2 SERPL-SCNC: 25 MMOL/L (ref 22–29)
CREAT SERPL-MCNC: 0.69 MG/DL (ref 0.76–1.27)
DEPRECATED RDW RBC AUTO: 58.4 FL (ref 37–54)
ERYTHROCYTE [DISTWIDTH] IN BLOOD BY AUTOMATED COUNT: 17.4 % (ref 12.3–15.4)
GFR SERPL CREATININE-BSD FRML MDRD: 113 ML/MIN/1.73
GLOBULIN UR ELPH-MCNC: 1.5 GM/DL
GLUCOSE SERPL-MCNC: 76 MG/DL (ref 65–99)
HCT VFR BLD AUTO: 24.9 % (ref 37.5–51)
HGB BLD-MCNC: 7.9 G/DL (ref 13–17.7)
INR PPP: 1.2 (ref 0.91–1.09)
MCH RBC QN AUTO: 30.9 PG (ref 26.6–33)
MCHC RBC AUTO-ENTMCNC: 31.7 G/DL (ref 31.5–35.7)
MCV RBC AUTO: 97.3 FL (ref 79–97)
PLATELET # BLD AUTO: 230 10*3/MM3 (ref 140–450)
PMV BLD AUTO: 9.2 FL (ref 6–12)
POTASSIUM SERPL-SCNC: 4.7 MMOL/L (ref 3.5–5.2)
PROT SERPL-MCNC: 3.8 G/DL (ref 6–8.5)
PROTHROMBIN TIME: 14.7 SECONDS (ref 11.9–14.6)
RBC # BLD AUTO: 2.56 10*6/MM3 (ref 4.14–5.8)
SODIUM SERPL-SCNC: 138 MMOL/L (ref 136–145)
WBC NRBC COR # BLD: 4.92 10*3/MM3 (ref 3.4–10.8)

## 2022-02-22 PROCEDURE — 94799 UNLISTED PULMONARY SVC/PX: CPT

## 2022-02-22 PROCEDURE — 25010000002 ONDANSETRON PER 1 MG: Performed by: INTERNAL MEDICINE

## 2022-02-22 PROCEDURE — 80053 COMPREHEN METABOLIC PANEL: CPT | Performed by: INTERNAL MEDICINE

## 2022-02-22 PROCEDURE — 43239 EGD BIOPSY SINGLE/MULTIPLE: CPT | Performed by: INTERNAL MEDICINE

## 2022-02-22 PROCEDURE — 0DB68ZX EXCISION OF STOMACH, VIA NATURAL OR ARTIFICIAL OPENING ENDOSCOPIC, DIAGNOSTIC: ICD-10-PCS | Performed by: INTERNAL MEDICINE

## 2022-02-22 PROCEDURE — 97535 SELF CARE MNGMENT TRAINING: CPT

## 2022-02-22 PROCEDURE — 87081 CULTURE SCREEN ONLY: CPT | Performed by: INTERNAL MEDICINE

## 2022-02-22 PROCEDURE — 97116 GAIT TRAINING THERAPY: CPT

## 2022-02-22 PROCEDURE — 85027 COMPLETE CBC AUTOMATED: CPT | Performed by: INTERNAL MEDICINE

## 2022-02-22 PROCEDURE — 25010000002 PROPOFOL 10 MG/ML EMULSION: Performed by: NURSE ANESTHETIST, CERTIFIED REGISTERED

## 2022-02-22 PROCEDURE — 88305 TISSUE EXAM BY PATHOLOGIST: CPT | Performed by: INTERNAL MEDICINE

## 2022-02-22 PROCEDURE — 85610 PROTHROMBIN TIME: CPT | Performed by: INTERNAL MEDICINE

## 2022-02-22 RX ORDER — LIDOCAINE HYDROCHLORIDE 20 MG/ML
INJECTION, SOLUTION EPIDURAL; INFILTRATION; INTRACAUDAL; PERINEURAL AS NEEDED
Status: DISCONTINUED | OUTPATIENT
Start: 2022-02-22 | End: 2022-02-22 | Stop reason: SURG

## 2022-02-22 RX ORDER — POLYETHYLENE GLYCOL 3350 17 G/17G
0.5 POWDER, FOR SOLUTION ORAL 2 TIMES DAILY
Status: DISCONTINUED | OUTPATIENT
Start: 2022-02-22 | End: 2022-02-22

## 2022-02-22 RX ORDER — PANTOPRAZOLE SODIUM 40 MG/10ML
40 INJECTION, POWDER, LYOPHILIZED, FOR SOLUTION INTRAVENOUS
Status: DISCONTINUED | OUTPATIENT
Start: 2022-02-22 | End: 2022-02-23

## 2022-02-22 RX ORDER — PROPOFOL 10 MG/ML
VIAL (ML) INTRAVENOUS AS NEEDED
Status: DISCONTINUED | OUTPATIENT
Start: 2022-02-22 | End: 2022-02-22 | Stop reason: SURG

## 2022-02-22 RX ORDER — SUCRALFATE ORAL 1 G/10ML
1 SUSPENSION ORAL
Status: DISCONTINUED | OUTPATIENT
Start: 2022-02-22 | End: 2022-02-24 | Stop reason: HOSPADM

## 2022-02-22 RX ORDER — SODIUM CHLORIDE 0.9 % (FLUSH) 0.9 %
10 SYRINGE (ML) INJECTION AS NEEDED
Status: DISCONTINUED | OUTPATIENT
Start: 2022-02-22 | End: 2022-02-22 | Stop reason: HOSPADM

## 2022-02-22 RX ORDER — POLYETHYLENE GLYCOL 3350 17 G/17G
POWDER, FOR SOLUTION ORAL 2 TIMES DAILY
Status: COMPLETED | OUTPATIENT
Start: 2022-02-22 | End: 2022-02-23

## 2022-02-22 RX ORDER — MAGNESIUM CARB/ALUMINUM HYDROX 105-160MG
296 TABLET,CHEWABLE ORAL ONCE
Status: COMPLETED | OUTPATIENT
Start: 2022-02-22 | End: 2022-02-22

## 2022-02-22 RX ORDER — SODIUM CHLORIDE 9 MG/ML
50 INJECTION, SOLUTION INTRAVENOUS CONTINUOUS
Status: DISCONTINUED | OUTPATIENT
Start: 2022-02-22 | End: 2022-02-24 | Stop reason: HOSPADM

## 2022-02-22 RX ORDER — SODIUM CHLORIDE 0.9 % (FLUSH) 0.9 %
10 SYRINGE (ML) INJECTION EVERY 12 HOURS SCHEDULED
Status: DISCONTINUED | OUTPATIENT
Start: 2022-02-22 | End: 2022-02-22 | Stop reason: HOSPADM

## 2022-02-22 RX ADMIN — ONDANSETRON 4 MG: 2 INJECTION INTRAMUSCULAR; INTRAVENOUS at 21:31

## 2022-02-22 RX ADMIN — FLUOXETINE HYDROCHLORIDE 20 MG: 20 CAPSULE ORAL at 14:16

## 2022-02-22 RX ADMIN — FOLIC ACID 1 MG: 1 TABLET ORAL at 14:16

## 2022-02-22 RX ADMIN — TRAZODONE HYDROCHLORIDE 50 MG: 50 TABLET ORAL at 21:32

## 2022-02-22 RX ADMIN — PROPOFOL 40 MG: 10 INJECTION, EMULSION INTRAVENOUS at 11:31

## 2022-02-22 RX ADMIN — OXYCODONE HYDROCHLORIDE AND ACETAMINOPHEN 1 TABLET: 5; 325 TABLET ORAL at 18:14

## 2022-02-22 RX ADMIN — SUCRALFATE 1 G: 1 SUSPENSION ORAL at 21:31

## 2022-02-22 RX ADMIN — MEXILETINE HYDROCHLORIDE 300 MG: 150 CAPSULE ORAL at 14:15

## 2022-02-22 RX ADMIN — ALBUTEROL SULFATE 2 PUFF: 90 AEROSOL, METERED RESPIRATORY (INHALATION) at 14:02

## 2022-02-22 RX ADMIN — OXYCODONE HYDROCHLORIDE AND ACETAMINOPHEN 1 TABLET: 5; 325 TABLET ORAL at 05:21

## 2022-02-22 RX ADMIN — PANTOPRAZOLE SODIUM 40 MG: 40 INJECTION, POWDER, FOR SOLUTION INTRAVENOUS at 21:31

## 2022-02-22 RX ADMIN — ALBUTEROL SULFATE 2 PUFF: 90 AEROSOL, METERED RESPIRATORY (INHALATION) at 05:53

## 2022-02-22 RX ADMIN — SODIUM CHLORIDE 100 ML/HR: 9 INJECTION, SOLUTION INTRAVENOUS at 10:42

## 2022-02-22 RX ADMIN — PANTOPRAZOLE SODIUM 8 MG/HR: 40 INJECTION, POWDER, FOR SOLUTION INTRAVENOUS at 05:58

## 2022-02-22 RX ADMIN — THIAMINE HCL TAB 100 MG 300 MG: 100 TAB at 14:15

## 2022-02-22 RX ADMIN — POLYETHYLENE GLYCOL 3350: 17 POWDER, FOR SOLUTION ORAL at 18:09

## 2022-02-22 RX ADMIN — OXYCODONE HYDROCHLORIDE AND ACETAMINOPHEN 1000 MG: 500 TABLET ORAL at 14:16

## 2022-02-22 RX ADMIN — PROPOFOL 20 MG: 10 INJECTION, EMULSION INTRAVENOUS at 11:38

## 2022-02-22 RX ADMIN — NYSTATIN: 100000 POWDER TOPICAL at 21:31

## 2022-02-22 RX ADMIN — PANTOPRAZOLE SODIUM 8 MG/HR: 40 INJECTION, POWDER, FOR SOLUTION INTRAVENOUS at 15:31

## 2022-02-22 RX ADMIN — OXYCODONE HYDROCHLORIDE AND ACETAMINOPHEN 1 TABLET: 5; 325 TABLET ORAL at 14:15

## 2022-02-22 RX ADMIN — OXYCODONE HYDROCHLORIDE AND ACETAMINOPHEN 1 TABLET: 5; 325 TABLET ORAL at 09:03

## 2022-02-22 RX ADMIN — ZINC SULFATE 220 MG (50 MG) CAPSULE 220 MG: CAPSULE at 14:15

## 2022-02-22 RX ADMIN — MEXILETINE HYDROCHLORIDE 300 MG: 150 CAPSULE ORAL at 21:31

## 2022-02-22 RX ADMIN — PANTOPRAZOLE SODIUM 8 MG/HR: 40 INJECTION, POWDER, FOR SOLUTION INTRAVENOUS at 00:53

## 2022-02-22 RX ADMIN — LIDOCAINE HYDROCHLORIDE 50 MG: 20 INJECTION, SOLUTION EPIDURAL; INFILTRATION; INTRACAUDAL; PERINEURAL at 11:31

## 2022-02-22 RX ADMIN — Medication 296 ML: at 14:16

## 2022-02-22 RX ADMIN — DOCUSATE SODIUM 50 MG AND SENNOSIDES 8.6 MG 1 TABLET: 8.6; 5 TABLET, FILM COATED ORAL at 21:32

## 2022-02-22 RX ADMIN — ATORVASTATIN CALCIUM 20 MG: 10 TABLET, FILM COATED ORAL at 21:31

## 2022-02-22 RX ADMIN — FLUOXETINE HYDROCHLORIDE 20 MG: 20 CAPSULE ORAL at 21:32

## 2022-02-22 RX ADMIN — OXYCODONE HYDROCHLORIDE AND ACETAMINOPHEN 1 TABLET: 5; 325 TABLET ORAL at 00:53

## 2022-02-22 RX ADMIN — NYSTATIN: 100000 POWDER TOPICAL at 14:16

## 2022-02-22 RX ADMIN — PROPOFOL 20 MG: 10 INJECTION, EMULSION INTRAVENOUS at 11:34

## 2022-02-22 RX ADMIN — ALBUTEROL SULFATE 2 PUFF: 90 AEROSOL, METERED RESPIRATORY (INHALATION) at 09:30

## 2022-02-22 RX ADMIN — SODIUM CHLORIDE, PRESERVATIVE FREE 10 ML: 5 INJECTION INTRAVENOUS at 21:32

## 2022-02-22 RX ADMIN — OXYCODONE HYDROCHLORIDE AND ACETAMINOPHEN 1 TABLET: 5; 325 TABLET ORAL at 22:02

## 2022-02-22 NOTE — ANESTHESIA PREPROCEDURE EVALUATION
Anesthesia Evaluation     Patient summary reviewed and Nursing notes reviewed   no history of anesthetic complications:  NPO Solid Status: > 6 hours  NPO Liquid Status: > 6 hours           Airway   Mallampati: III  TM distance: >3 FB  Neck ROM: limited  difficult intubation highly probable  Comment: c collar in place  Dental    (+) edentulous    Pulmonary - normal exam   (-) COPD, asthma, sleep apnea, not a smoker  Cardiovascular - normal exam  Exercise tolerance: (Limited mobility, uses walker)    ECG reviewed    (+) hypertension, dysrhythmias, hyperlipidemia,       Neuro/Psych  (-) seizures, TIA, CVA  GI/Hepatic/Renal/Endo    (-) liver disease, no renal disease    Musculoskeletal     (+) back pain, chronic pain, neck pain, neck stiffness,   Abdominal    Substance History      OB/GYN          Other      history of cancer                      Anesthesia Plan    ASA 4 - emergent     MAC     intravenous induction     Anesthetic plan, all risks, benefits, and alternatives have been provided, discussed and informed consent has been obtained with: patient.         Per Dr. Donis's H&P:  CT scan of the brain is unremarkable for any acute change.  CT scan of the cervical spine shows a type II odontoid fracture with retropulsion and likely instability.  There is also a fracture of the posterior ring of C1 Huey-type.  He has at least a 4 level anterior cervical fusion from multiple prior corpectomies from C3 to C7.  The patient has a fractured C2 and C1 in a manner that slightly unstable.  He will require surgical stabilization.  Given the fact that he has had multiple prior anterior cervical surgeries and corpectomies I think the best way to treat his fracture and instability would be through a posterior cervical fusion from C1 down to C4.  This will stabilize the C1 2 fracture and will fuse the last mobile segment of his neck at C2 3 so that he does not develop problems there in the future

## 2022-02-22 NOTE — ANESTHESIA POSTPROCEDURE EVALUATION
Patient: Vikas Cruz    Procedure Summary     Date: 02/22/22 Room / Location: [facility] ENDOSCOPY 4 / [facility] ENDOSCOPY    Anesthesia Start: 1126 Anesthesia Stop: 1156    Procedure: ESOPHAGOGASTRODUODENOSCOPY WITH ANESTHESIA (N/A ) Diagnosis:       Iron deficiency anemia, unspecified iron deficiency anemia type      (Iron deficiency anemia, unspecified iron deficiency anemia type [D50.9])    Surgeons: Jaydon Lemus MD Provider: Jose Arriaga CRNA    Anesthesia Type: MAC ASA Status: 4 - Emergent          Anesthesia Type: MAC    Vitals  Vitals Value Taken Time   BP 77/45 02/22/22 1153   Temp     Pulse 58 02/22/22 1156   Resp 15 02/22/22 1153   SpO2 99 % 02/22/22 1156   Vitals shown include unvalidated device data.        Post Anesthesia Care and Evaluation    Patient location during evaluation: PHASE II  Patient participation: complete - patient participated  Level of consciousness: awake and awake and alert  Pain score: 0  Pain management: adequate  Airway patency: patent  Anesthetic complications: No anesthetic complications  PONV Status: none  Cardiovascular status: acceptable  Respiratory status: acceptable  Hydration status: acceptable

## 2022-02-23 ENCOUNTER — ANESTHESIA EVENT (OUTPATIENT)
Dept: GASTROENTEROLOGY | Facility: HOSPITAL | Age: 72
End: 2022-02-23

## 2022-02-23 ENCOUNTER — ANESTHESIA (OUTPATIENT)
Dept: GASTROENTEROLOGY | Facility: HOSPITAL | Age: 72
End: 2022-02-23

## 2022-02-23 PROBLEM — R63.4 WEIGHT LOSS: Status: ACTIVE | Noted: 2022-02-08

## 2022-02-23 PROBLEM — R19.5 HEME POSITIVE STOOL: Status: ACTIVE | Noted: 2022-02-08

## 2022-02-23 PROBLEM — K62.1 RECTAL POLYP: Status: ACTIVE | Noted: 2022-02-23

## 2022-02-23 PROBLEM — K25.9 GASTRIC ULCER: Status: ACTIVE | Noted: 2022-01-01

## 2022-02-23 PROBLEM — D62 ACUTE POSTHEMORRHAGIC ANEMIA: Status: ACTIVE | Noted: 2022-02-08

## 2022-02-23 LAB
ALBUMIN SERPL-MCNC: 2.4 G/DL (ref 3.5–5.2)
ALBUMIN/GLOB SERPL: 1.8 G/DL
ALP SERPL-CCNC: 73 U/L (ref 39–117)
ALT SERPL W P-5'-P-CCNC: 13 U/L (ref 1–41)
ANION GAP SERPL CALCULATED.3IONS-SCNC: 5 MMOL/L (ref 5–15)
AST SERPL-CCNC: 15 U/L (ref 1–40)
BILIRUB SERPL-MCNC: 0.3 MG/DL (ref 0–1.2)
BUN SERPL-MCNC: 18 MG/DL (ref 8–23)
BUN/CREAT SERPL: 28.1 (ref 7–25)
CALCIUM SPEC-SCNC: 7.3 MG/DL (ref 8.6–10.5)
CHLORIDE SERPL-SCNC: 108 MMOL/L (ref 98–107)
CO2 SERPL-SCNC: 24 MMOL/L (ref 22–29)
CREAT SERPL-MCNC: 0.64 MG/DL (ref 0.76–1.27)
DEPRECATED RDW RBC AUTO: 59.3 FL (ref 37–54)
ERYTHROCYTE [DISTWIDTH] IN BLOOD BY AUTOMATED COUNT: 18.4 % (ref 12.3–15.4)
GFR SERPL CREATININE-BSD FRML MDRD: 123 ML/MIN/1.73
GLOBULIN UR ELPH-MCNC: 1.3 GM/DL
GLUCOSE SERPL-MCNC: 117 MG/DL (ref 65–99)
HCT VFR BLD AUTO: 25.7 % (ref 37.5–51)
HGB BLD-MCNC: 7.8 G/DL (ref 13–17.7)
MCH RBC QN AUTO: 30.1 PG (ref 26.6–33)
MCHC RBC AUTO-ENTMCNC: 30.4 G/DL (ref 31.5–35.7)
MCV RBC AUTO: 99.2 FL (ref 79–97)
PLATELET # BLD AUTO: 244 10*3/MM3 (ref 140–450)
PMV BLD AUTO: 8.9 FL (ref 6–12)
POTASSIUM SERPL-SCNC: 4.7 MMOL/L (ref 3.5–5.2)
PROT SERPL-MCNC: 3.7 G/DL (ref 6–8.5)
RBC # BLD AUTO: 2.59 10*6/MM3 (ref 4.14–5.8)
SODIUM SERPL-SCNC: 137 MMOL/L (ref 136–145)
UREASE TISS QL: NEGATIVE
WBC NRBC COR # BLD: 3.97 10*3/MM3 (ref 3.4–10.8)

## 2022-02-23 PROCEDURE — 85027 COMPLETE CBC AUTOMATED: CPT | Performed by: INTERNAL MEDICINE

## 2022-02-23 PROCEDURE — 88305 TISSUE EXAM BY PATHOLOGIST: CPT | Performed by: INTERNAL MEDICINE

## 2022-02-23 PROCEDURE — 80053 COMPREHEN METABOLIC PANEL: CPT | Performed by: INTERNAL MEDICINE

## 2022-02-23 PROCEDURE — 45380 COLONOSCOPY AND BIOPSY: CPT | Performed by: INTERNAL MEDICINE

## 2022-02-23 PROCEDURE — 92526 ORAL FUNCTION THERAPY: CPT | Performed by: SPEECH-LANGUAGE PATHOLOGIST

## 2022-02-23 PROCEDURE — 0DBP8ZX EXCISION OF RECTUM, VIA NATURAL OR ARTIFICIAL OPENING ENDOSCOPIC, DIAGNOSTIC: ICD-10-PCS | Performed by: INTERNAL MEDICINE

## 2022-02-23 PROCEDURE — 25010000002 PROPOFOL 10 MG/ML EMULSION: Performed by: NURSE ANESTHETIST, CERTIFIED REGISTERED

## 2022-02-23 RX ORDER — SODIUM CHLORIDE 9 MG/ML
100 INJECTION, SOLUTION INTRAVENOUS CONTINUOUS
Status: DISCONTINUED | OUTPATIENT
Start: 2022-02-23 | End: 2022-02-23

## 2022-02-23 RX ORDER — LIDOCAINE HYDROCHLORIDE 20 MG/ML
INJECTION, SOLUTION EPIDURAL; INFILTRATION; INTRACAUDAL; PERINEURAL AS NEEDED
Status: DISCONTINUED | OUTPATIENT
Start: 2022-02-23 | End: 2022-02-23 | Stop reason: SURG

## 2022-02-23 RX ORDER — PANTOPRAZOLE SODIUM 40 MG/1
40 TABLET, DELAYED RELEASE ORAL
Status: DISCONTINUED | OUTPATIENT
Start: 2022-02-23 | End: 2022-02-24 | Stop reason: HOSPADM

## 2022-02-23 RX ORDER — SODIUM CHLORIDE 0.9 % (FLUSH) 0.9 %
10 SYRINGE (ML) INJECTION EVERY 12 HOURS SCHEDULED
Status: DISCONTINUED | OUTPATIENT
Start: 2022-02-23 | End: 2022-02-23 | Stop reason: HOSPADM

## 2022-02-23 RX ORDER — SODIUM CHLORIDE 0.9 % (FLUSH) 0.9 %
10 SYRINGE (ML) INJECTION AS NEEDED
Status: DISCONTINUED | OUTPATIENT
Start: 2022-02-23 | End: 2022-02-23 | Stop reason: HOSPADM

## 2022-02-23 RX ORDER — PROPOFOL 10 MG/ML
VIAL (ML) INTRAVENOUS AS NEEDED
Status: DISCONTINUED | OUTPATIENT
Start: 2022-02-23 | End: 2022-02-23 | Stop reason: SURG

## 2022-02-23 RX ORDER — SODIUM CHLORIDE 0.9 % (FLUSH) 0.9 %
3 SYRINGE (ML) INJECTION EVERY 12 HOURS SCHEDULED
Status: DISCONTINUED | OUTPATIENT
Start: 2022-02-23 | End: 2022-02-23 | Stop reason: HOSPADM

## 2022-02-23 RX ADMIN — POLYETHYLENE GLYCOL 3350: 17 POWDER, FOR SOLUTION ORAL at 06:05

## 2022-02-23 RX ADMIN — OXYCODONE HYDROCHLORIDE AND ACETAMINOPHEN 1 TABLET: 5; 325 TABLET ORAL at 20:46

## 2022-02-23 RX ADMIN — OXYCODONE HYDROCHLORIDE AND ACETAMINOPHEN 1 TABLET: 5; 325 TABLET ORAL at 16:35

## 2022-02-23 RX ADMIN — FOLIC ACID 1 MG: 1 TABLET ORAL at 12:48

## 2022-02-23 RX ADMIN — PANTOPRAZOLE SODIUM 40 MG: 40 INJECTION, POWDER, FOR SOLUTION INTRAVENOUS at 12:47

## 2022-02-23 RX ADMIN — SUCRALFATE 1 G: 1 SUSPENSION ORAL at 20:46

## 2022-02-23 RX ADMIN — PANTOPRAZOLE SODIUM 40 MG: 40 TABLET, DELAYED RELEASE ORAL at 20:46

## 2022-02-23 RX ADMIN — SUCRALFATE 1 G: 1 SUSPENSION ORAL at 12:47

## 2022-02-23 RX ADMIN — SODIUM CHLORIDE, PRESERVATIVE FREE 10 ML: 5 INJECTION INTRAVENOUS at 20:47

## 2022-02-23 RX ADMIN — NYSTATIN: 100000 POWDER TOPICAL at 12:47

## 2022-02-23 RX ADMIN — OXYCODONE HYDROCHLORIDE AND ACETAMINOPHEN 1 TABLET: 5; 325 TABLET ORAL at 06:28

## 2022-02-23 RX ADMIN — SODIUM CHLORIDE, PRESERVATIVE FREE 10 ML: 5 INJECTION INTRAVENOUS at 12:48

## 2022-02-23 RX ADMIN — FLUOXETINE HYDROCHLORIDE 20 MG: 20 CAPSULE ORAL at 20:46

## 2022-02-23 RX ADMIN — LIDOCAINE HYDROCHLORIDE 80 MG: 20 INJECTION, SOLUTION EPIDURAL; INFILTRATION; INTRACAUDAL; PERINEURAL at 10:04

## 2022-02-23 RX ADMIN — TRAZODONE HYDROCHLORIDE 50 MG: 50 TABLET ORAL at 20:47

## 2022-02-23 RX ADMIN — PROPOFOL 220 MG: 10 INJECTION, EMULSION INTRAVENOUS at 10:04

## 2022-02-23 RX ADMIN — OXYCODONE HYDROCHLORIDE AND ACETAMINOPHEN 1 TABLET: 5; 325 TABLET ORAL at 02:25

## 2022-02-23 RX ADMIN — SUCRALFATE 1 G: 1 SUSPENSION ORAL at 16:35

## 2022-02-23 RX ADMIN — NYSTATIN: 100000 POWDER TOPICAL at 20:46

## 2022-02-23 RX ADMIN — PANTOPRAZOLE SODIUM 40 MG: 40 INJECTION, POWDER, FOR SOLUTION INTRAVENOUS at 16:35

## 2022-02-23 RX ADMIN — DOCUSATE SODIUM 50 MG AND SENNOSIDES 8.6 MG 1 TABLET: 8.6; 5 TABLET, FILM COATED ORAL at 20:47

## 2022-02-23 RX ADMIN — FLUOXETINE HYDROCHLORIDE 20 MG: 20 CAPSULE ORAL at 12:48

## 2022-02-23 RX ADMIN — MEXILETINE HYDROCHLORIDE 300 MG: 150 CAPSULE ORAL at 12:48

## 2022-02-23 RX ADMIN — ATORVASTATIN CALCIUM 20 MG: 10 TABLET, FILM COATED ORAL at 20:47

## 2022-02-23 RX ADMIN — MEXILETINE HYDROCHLORIDE 300 MG: 150 CAPSULE ORAL at 20:46

## 2022-02-23 RX ADMIN — THIAMINE HCL TAB 100 MG 300 MG: 100 TAB at 12:49

## 2022-02-23 RX ADMIN — OXYCODONE HYDROCHLORIDE AND ACETAMINOPHEN 1 TABLET: 5; 325 TABLET ORAL at 12:48

## 2022-02-23 NOTE — ANESTHESIA POSTPROCEDURE EVALUATION
"Patient: Vikas Cruz    Procedure Summary     Date: 02/23/22 Room / Location: [facility] ENDOSCOPY 6 / [facility]OS[facility]    Anesthesia Start: 1001 Anesthesia Stop: 1035    Procedure: COLONOSCOPY WITH ANESTHESIA (N/A ) Diagnosis:       Moderate protein-calorie malnutrition (HCC)      Weight loss      Acute posthemorrhagic anemia      Heme positive stool      (Moderate protein-calorie malnutrition (HCC) [E44.0])      (Weight loss [R63.4])      (Acute posthemorrhagic anemia [D62])      (Heme positive stool [R19.5])    Surgeons: Jaydon Lemus MD Provider: Joanie Lucero CRNA    Anesthesia Type: MAC ASA Status: 3          Anesthesia Type: MAC    Vitals  Vitals Value Taken Time   BP     Temp     Pulse 55 02/23/22 1035   Resp     SpO2 98 % 02/23/22 1035   Vitals shown include unvalidated device data.        Post Anesthesia Care and Evaluation    Patient location during evaluation: PACU  Patient participation: complete - patient participated  Level of consciousness: awake and alert  Pain management: adequate  Airway patency: patent  Anesthetic complications: No anesthetic complications    Cardiovascular status: acceptable  Respiratory status: acceptable  Hydration status: acceptable    Comments: Blood pressure 147/48, pulse 60, temperature 97.8 °F (36.6 °C), temperature source Oral, resp. rate 16, height 177.8 cm (70\"), weight 65 kg (143 lb 3.2 oz), SpO2 98 %.    Pt discharged from PACU based on opal score >8  No anesthesia care post op    "

## 2022-02-23 NOTE — ANESTHESIA PREPROCEDURE EVALUATION
Anesthesia Evaluation     Patient summary reviewed and Nursing notes reviewed   no history of anesthetic complications:  NPO Solid Status: > 6 hours  NPO Liquid Status: > 6 hours           Airway   Mallampati: III  TM distance: >3 FB  Neck ROM: limited  difficult intubation highly probable  Comment: c collar in place  Dental    (+) edentulous    Pulmonary    (-) COPD, asthma, sleep apnea, not a smoker  Cardiovascular   Exercise tolerance: (Limited mobility, uses walker)    ECG reviewed    (+) hypertension, dysrhythmias, hyperlipidemia,       Neuro/Psych  (-) seizures, TIA, CVA  GI/Hepatic/Renal/Endo    (+)  GI bleeding lower , renal disease CRI,   (-) liver disease    Musculoskeletal     (+) back pain, chronic pain, neck pain, neck stiffness,   Abdominal    Substance History      OB/GYN          Other      history of cancer                      Anesthesia Plan    ASA 3     MAC     intravenous induction     Anesthetic plan, all risks, benefits, and alternatives have been provided, discussed and informed consent has been obtained with: patient.         Per Dr. Donis's H&P:  CT scan of the brain is unremarkable for any acute change.  CT scan of the cervical spine shows a type II odontoid fracture with retropulsion and likely instability.  There is also a fracture of the posterior ring of C1 Huey-type.  He has at least a 4 level anterior cervical fusion from multiple prior corpectomies from C3 to C7.  The patient has a fractured C2 and C1 in a manner that slightly unstable.  He will require surgical stabilization.  Given the fact that he has had multiple prior anterior cervical surgeries and corpectomies I think the best way to treat his fracture and instability would be through a posterior cervical fusion from C1 down to C4.  This will stabilize the C1 2 fracture and will fuse the last mobile segment of his neck at C2 3 so that he does not develop problems there in the future

## 2022-02-24 ENCOUNTER — READMISSION MANAGEMENT (OUTPATIENT)
Dept: CALL CENTER | Facility: HOSPITAL | Age: 72
End: 2022-02-24

## 2022-02-24 VITALS
DIASTOLIC BLOOD PRESSURE: 69 MMHG | RESPIRATION RATE: 18 BRPM | BODY MASS INDEX: 20.5 KG/M2 | TEMPERATURE: 97.8 F | WEIGHT: 143.2 LBS | SYSTOLIC BLOOD PRESSURE: 166 MMHG | HEIGHT: 70 IN | HEART RATE: 64 BPM | OXYGEN SATURATION: 98 %

## 2022-02-24 LAB
ALBUMIN SERPL-MCNC: 2.3 G/DL (ref 3.5–5.2)
ALBUMIN/GLOB SERPL: 1.6 G/DL
ALP SERPL-CCNC: 78 U/L (ref 39–117)
ALT SERPL W P-5'-P-CCNC: 12 U/L (ref 1–41)
ANION GAP SERPL CALCULATED.3IONS-SCNC: 4 MMOL/L (ref 5–15)
AST SERPL-CCNC: 14 U/L (ref 1–40)
BILIRUB SERPL-MCNC: 0.3 MG/DL (ref 0–1.2)
BUN SERPL-MCNC: 14 MG/DL (ref 8–23)
BUN/CREAT SERPL: 21.9 (ref 7–25)
CALCIUM SPEC-SCNC: 7.2 MG/DL (ref 8.6–10.5)
CHLORIDE SERPL-SCNC: 109 MMOL/L (ref 98–107)
CO2 SERPL-SCNC: 23 MMOL/L (ref 22–29)
CREAT SERPL-MCNC: 0.64 MG/DL (ref 0.76–1.27)
CYTO UR: NORMAL
DEPRECATED RDW RBC AUTO: 58.5 FL (ref 37–54)
ERYTHROCYTE [DISTWIDTH] IN BLOOD BY AUTOMATED COUNT: 18.6 % (ref 12.3–15.4)
GFR SERPL CREATININE-BSD FRML MDRD: 123 ML/MIN/1.73
GLOBULIN UR ELPH-MCNC: 1.4 GM/DL
GLUCOSE SERPL-MCNC: 75 MG/DL (ref 65–99)
HCT VFR BLD AUTO: 26 % (ref 37.5–51)
HGB BLD-MCNC: 8 G/DL (ref 13–17.7)
INR PPP: 1.18 (ref 0.91–1.09)
LAB AP CASE REPORT: NORMAL
MCH RBC QN AUTO: 30 PG (ref 26.6–33)
MCHC RBC AUTO-ENTMCNC: 30.8 G/DL (ref 31.5–35.7)
MCV RBC AUTO: 97.4 FL (ref 79–97)
PATH REPORT.FINAL DX SPEC: NORMAL
PATH REPORT.GROSS SPEC: NORMAL
PLATELET # BLD AUTO: 253 10*3/MM3 (ref 140–450)
PMV BLD AUTO: 9 FL (ref 6–12)
POTASSIUM SERPL-SCNC: 4.9 MMOL/L (ref 3.5–5.2)
PROT SERPL-MCNC: 3.7 G/DL (ref 6–8.5)
PROTHROMBIN TIME: 14.6 SECONDS (ref 11.9–14.6)
RBC # BLD AUTO: 2.67 10*6/MM3 (ref 4.14–5.8)
SODIUM SERPL-SCNC: 136 MMOL/L (ref 136–145)
WBC NRBC COR # BLD: 4.28 10*3/MM3 (ref 3.4–10.8)

## 2022-02-24 PROCEDURE — 97116 GAIT TRAINING THERAPY: CPT

## 2022-02-24 PROCEDURE — 97535 SELF CARE MNGMENT TRAINING: CPT

## 2022-02-24 PROCEDURE — 85610 PROTHROMBIN TIME: CPT | Performed by: INTERNAL MEDICINE

## 2022-02-24 PROCEDURE — 80053 COMPREHEN METABOLIC PANEL: CPT | Performed by: INTERNAL MEDICINE

## 2022-02-24 PROCEDURE — 85027 COMPLETE CBC AUTOMATED: CPT | Performed by: INTERNAL MEDICINE

## 2022-02-24 RX ORDER — POLYETHYLENE GLYCOL 3350 17 G/17G
17 POWDER, FOR SOLUTION ORAL DAILY
Qty: 30 PACKET | Refills: 0 | Status: SHIPPED | OUTPATIENT
Start: 2022-02-24 | End: 2022-02-24

## 2022-02-24 RX ORDER — SUCRALFATE ORAL 1 G/10ML
1 SUSPENSION ORAL
Qty: 1200 ML | Refills: 0 | Status: SHIPPED | OUTPATIENT
Start: 2022-02-24 | End: 2022-03-26

## 2022-02-24 RX ORDER — SUCRALFATE ORAL 1 G/10ML
1 SUSPENSION ORAL
Qty: 1200 ML | Refills: 0 | Status: SHIPPED | OUTPATIENT
Start: 2022-02-24 | End: 2022-02-24

## 2022-02-24 RX ORDER — POLYETHYLENE GLYCOL 3350 17 G/17G
17 POWDER, FOR SOLUTION ORAL DAILY
Qty: 30 PACKET | Refills: 0 | Status: SHIPPED | OUTPATIENT
Start: 2022-02-24 | End: 2022-03-26

## 2022-02-24 RX ORDER — PANTOPRAZOLE SODIUM 40 MG/1
40 TABLET, DELAYED RELEASE ORAL
Qty: 60 TABLET | Refills: 0 | Status: SHIPPED | OUTPATIENT
Start: 2022-02-24 | End: 2022-02-24

## 2022-02-24 RX ORDER — FLUOXETINE HYDROCHLORIDE 20 MG/1
20 CAPSULE ORAL EVERY 12 HOURS SCHEDULED
Qty: 60 CAPSULE | Refills: 0 | Status: SHIPPED | OUTPATIENT
Start: 2022-02-24 | End: 2022-03-26

## 2022-02-24 RX ORDER — PANTOPRAZOLE SODIUM 40 MG/1
40 TABLET, DELAYED RELEASE ORAL
Qty: 60 TABLET | Refills: 0 | Status: SHIPPED | OUTPATIENT
Start: 2022-02-24 | End: 2022-03-26

## 2022-02-24 RX ORDER — FOLIC ACID 1 MG/1
1 TABLET ORAL DAILY
Qty: 30 TABLET | Refills: 0 | Status: SHIPPED | OUTPATIENT
Start: 2022-02-24 | End: 2022-02-24 | Stop reason: HOSPADM

## 2022-02-24 RX ORDER — AMOXICILLIN 250 MG
1 CAPSULE ORAL 2 TIMES DAILY
Qty: 60 TABLET | Refills: 0 | Status: SHIPPED | OUTPATIENT
Start: 2022-02-24 | End: 2022-03-26

## 2022-02-24 RX ORDER — FLUOXETINE HYDROCHLORIDE 20 MG/1
20 CAPSULE ORAL EVERY 12 HOURS SCHEDULED
Qty: 60 CAPSULE | Refills: 0 | Status: SHIPPED | OUTPATIENT
Start: 2022-02-24 | End: 2022-02-24

## 2022-02-24 RX ADMIN — FOLIC ACID 1 MG: 1 TABLET ORAL at 08:19

## 2022-02-24 RX ADMIN — SUCRALFATE 1 G: 1 SUSPENSION ORAL at 08:19

## 2022-02-24 RX ADMIN — OXYCODONE HYDROCHLORIDE AND ACETAMINOPHEN 1 TABLET: 5; 325 TABLET ORAL at 12:27

## 2022-02-24 RX ADMIN — SODIUM CHLORIDE, PRESERVATIVE FREE 10 ML: 5 INJECTION INTRAVENOUS at 08:20

## 2022-02-24 RX ADMIN — PANTOPRAZOLE SODIUM 40 MG: 40 TABLET, DELAYED RELEASE ORAL at 16:46

## 2022-02-24 RX ADMIN — SUCRALFATE 1 G: 1 SUSPENSION ORAL at 12:27

## 2022-02-24 RX ADMIN — SUCRALFATE 1 G: 1 SUSPENSION ORAL at 16:47

## 2022-02-24 RX ADMIN — DOCUSATE SODIUM 50 MG AND SENNOSIDES 8.6 MG 1 TABLET: 8.6; 5 TABLET, FILM COATED ORAL at 08:19

## 2022-02-24 RX ADMIN — OXYCODONE HYDROCHLORIDE AND ACETAMINOPHEN 1 TABLET: 5; 325 TABLET ORAL at 01:44

## 2022-02-24 RX ADMIN — THIAMINE HCL TAB 100 MG 300 MG: 100 TAB at 08:19

## 2022-02-24 RX ADMIN — OXYCODONE HYDROCHLORIDE AND ACETAMINOPHEN 1 TABLET: 5; 325 TABLET ORAL at 08:19

## 2022-02-24 RX ADMIN — PANTOPRAZOLE SODIUM 40 MG: 40 TABLET, DELAYED RELEASE ORAL at 08:19

## 2022-02-24 RX ADMIN — MEXILETINE HYDROCHLORIDE 300 MG: 150 CAPSULE ORAL at 08:19

## 2022-02-24 RX ADMIN — POLYETHYLENE GLYCOL 3350 17 G: 17 POWDER, FOR SOLUTION ORAL at 08:19

## 2022-02-24 RX ADMIN — FLUOXETINE HYDROCHLORIDE 20 MG: 20 CAPSULE ORAL at 08:19

## 2022-02-24 RX ADMIN — NYSTATIN: 100000 POWDER TOPICAL at 08:20

## 2022-02-25 ENCOUNTER — READMISSION MANAGEMENT (OUTPATIENT)
Dept: CALL CENTER | Facility: HOSPITAL | Age: 72
End: 2022-02-25

## 2022-02-25 NOTE — OUTREACH NOTE
Prep Survey      Responses   [facility] patient discharged from? [place]   Is LACE score < 7 ? No   Emergency Room discharge w/ pulse ox? No   Eligibility Readm Mgmt   Discharge diagnosis Large gastric and duodenal ulcers, rectal polyp, acute UTI, Covid-19, Iron deficiency anemia   Does the patient have one of the following disease processes/diagnoses(primary or secondary)? COVID-19   Does the patient have Home health ordered? Yes   What is the [facility] health agency?  [facility]    Is there a DME ordered? No   Prep survey completed? Yes          Beth Sheppard RN

## 2022-02-25 NOTE — OUTREACH NOTE
COVID-19 Week 1 Survey      Responses   [facility] patient discharged from? [place]   Does the patient have one of the following disease processes/diagnoses(primary or secondary)? COVID-19   COVID-19 underlying condition? None  [EGD and colonoscopy]   Call Number Call 1   Week 1 Call successful? No   Discharge diagnosis Large gastric and duodenal ulcers, rectal polyp, acute UTI, Covid-19, Iron deficiency anemia          Zakiya Oreilly RN

## 2022-02-26 ENCOUNTER — READMISSION MANAGEMENT (OUTPATIENT)
Dept: CALL CENTER | Facility: HOSPITAL | Age: 72
End: 2022-02-26

## 2022-02-26 NOTE — OUTREACH NOTE
COVID-19 Week 1 Survey      Responses   [facility] patient discharged from? [place]   Does the patient have one of the following disease processes/diagnoses(primary or secondary)? COVID-19   COVID-19 underlying condition? None   Call Number Call 2   Week 1 Call successful? Yes   Call start time 0927   Call end time 0931   Discharge diagnosis Large gastric and duodenal ulcers, rectal polyp, acute UTI, Covid-19, Iron deficiency anemia   Meds reviewed with patient/caregiver? Yes   Is the patient having any side effects they believe may be caused by any medication additions or changes? No   Does the patient have all medications ordered at discharge? No   What is keeping the patient from filling the prescriptions? --  [pt plans to pick them up today]   Nursing Interventions Nurse provided patient education   Is the patient taking all medications as directed (includes completed medication regime)? Yes   Does the patient have a primary care provider?  Yes   Comments regarding PCP Pt stated spouse made a f/u appt, but he was not sure when it was   Does the patient have an appointment with their PCP or specialist within 7 days of discharge? Yes   Has the patient kept scheduled appointments due by today? N/A   What is the [facility] health agency?  [facility]     Home health comments spouse called  2/25/22, but pt was not sure what was said   Psychosocial issues? No   Did the patient receive a copy of their discharge instructions? Yes   Did the patient receive a copy of COVID-19 specific instructions? Yes   Nursing interventions Reviewed instructions with patient   What is the patient's perception of their health status since discharge? Improving   Does the patient have any of the following symptoms? None   Nursing Interventions Nurse provided patient education   Pulse Ox monitoring None   Is the patient/caregiver able to teach back steps to recovery at home? Set small, achievable goals for return to baseline health,   Rest and rebuild strength, gradually increase activity,  Eat a well-balance diet   If the patient is a current smoker, are they able to teach back resources for cessation? Not a smoker   Is the patient/caregiver able to teach back the hierarchy of who to call/visit for symptoms/problems? PCP, Specialist, Home health nurse, Urgent Care, ED, 911 Yes   COVID-19 call completed? Yes          JENNIE MAYO RN

## 2022-02-27 ENCOUNTER — READMISSION MANAGEMENT (OUTPATIENT)
Dept: CALL CENTER | Facility: HOSPITAL | Age: 72
End: 2022-02-27

## 2022-02-27 NOTE — OUTREACH NOTE
COVID-19 Week 1 Survey      Responses   [facility] patient discharged from? [place]   Does the patient have one of the following disease processes/diagnoses(primary or secondary)? COVID-19   COVID-19 underlying condition? None   Call Number Call 3   Week 1 Call successful? Yes   Call start time 0939   Call end time 0944   Discharge diagnosis Large gastric and duodenal ulcers, rectal polyp, acute UTI, Covid-19, Iron deficiency anemia   Is patient permission given to speak with other caregiver? Yes   List who call center can speak with Amara spouse    Person spoke with today (if not patient) and relationship Amara spouse    Meds reviewed with patient/caregiver? Yes   Is the patient having any side effects they believe may be caused by any medication additions or changes? No   Does the patient have all medications ordered at discharge? Yes   Is the patient taking all medications as directed (includes completed medication regime)? Yes   Does the patient have a primary care provider?  Yes   Comments regarding PCP 3-3-22 with PCP    What is the Home health agency?  [facility]    Psychosocial issues? No   What is the patient's perception of their health status since discharge? Same   Does the patient have any of the following symptoms? None   Pulse Ox monitoring None   Is the patient/caregiver able to teach back steps to recovery at home? Set small, achievable goals for return to baseline health,  Rest and rebuild strength, gradually increase activity   COVID-19 call completed? Yes   Wrap up additional comments very weak and low appetite per spouse - spouse feels he was released too early - encouraged spouse to encourage pt to return to ER if symptoms worsen           Adele Samaniego RN

## 2022-02-28 LAB
CYTO UR: NORMAL
LAB AP CASE REPORT: NORMAL
LAB AP DIAGNOSIS COMMENT: NORMAL
PATH REPORT.FINAL DX SPEC: NORMAL
PATH REPORT.GROSS SPEC: NORMAL

## 2022-03-02 ENCOUNTER — READMISSION MANAGEMENT (OUTPATIENT)
Dept: CALL CENTER | Facility: HOSPITAL | Age: 72
End: 2022-03-02

## 2022-03-02 NOTE — OUTREACH NOTE
COVID-19 Week 2 Survey      Responses   [facility] patient discharged from? [place]   Does the patient have one of the following disease processes/diagnoses(primary or secondary)? COVID-19   COVID-19 underlying condition? None   Call Number Call 1   COVID-19 Week 2: Call 1 attempt successful? No   Discharge diagnosis Large gastric and duodenal ulcers, rectal polyp, acute UTI, Covid-19, Iron deficiency anemia          Gwendolyn Childress RN

## 2022-03-09 ENCOUNTER — READMISSION MANAGEMENT (OUTPATIENT)
Dept: CALL CENTER | Facility: HOSPITAL | Age: 72
End: 2022-03-09

## 2022-03-09 NOTE — OUTREACH NOTE
COVID-19 Week 3 Survey    Flowsheet Row Responses   [facility] facility patient discharged from? [place]   Does the patient have one of the following disease processes/diagnoses(primary or secondary)? COVID-19   COVID-19 underlying condition? None   Call Number Call 1   COVID-19 Week 3: Call 1 attempt successful? No   Discharge diagnosis Large gastric and duodenal ulcers, rectal polyp, acute UTI, Covid-19, Iron deficiency anemia          PHYLICIA WINCHESTER - Registered Nurse

## 2022-08-01 ENCOUNTER — OFFICE VISIT (OUTPATIENT)
Dept: INTERNAL MEDICINE | Facility: CLINIC | Age: 72
End: 2022-08-01

## 2022-08-01 VITALS
SYSTOLIC BLOOD PRESSURE: 113 MMHG | TEMPERATURE: 97.6 F | HEART RATE: 94 BPM | HEIGHT: 70 IN | OXYGEN SATURATION: 98 % | WEIGHT: 117 LBS | DIASTOLIC BLOOD PRESSURE: 68 MMHG | BODY MASS INDEX: 16.75 KG/M2

## 2022-08-01 DIAGNOSIS — K59.03 DRUG-INDUCED CONSTIPATION: ICD-10-CM

## 2022-08-01 DIAGNOSIS — F41.8 SITUATIONAL ANXIETY: ICD-10-CM

## 2022-08-01 DIAGNOSIS — I10 PRIMARY HYPERTENSION: Primary | ICD-10-CM

## 2022-08-01 PROCEDURE — 99213 OFFICE O/P EST LOW 20 MIN: CPT | Performed by: INTERNAL MEDICINE

## 2022-08-01 RX ORDER — ALPRAZOLAM 0.25 MG/1
0.25 TABLET ORAL 2 TIMES DAILY PRN
Qty: 45 TABLET | Refills: 0 | Status: SHIPPED | OUTPATIENT
Start: 2022-08-01 | End: 2022-09-06

## 2022-08-01 RX ORDER — NYSTATIN 100000 [USP'U]/G
POWDER TOPICAL
COMMUNITY
Start: 2022-07-28

## 2022-08-01 RX ORDER — SUCRALFATE 1 G/1
TABLET ORAL
COMMUNITY
Start: 2022-07-28

## 2022-08-01 RX ORDER — CYPROHEPTADINE HYDROCHLORIDE 4 MG/1
4 TABLET ORAL
COMMUNITY
Start: 2022-07-29

## 2022-08-01 RX ORDER — LORATADINE 10 MG
500 TABLET ORAL DAILY
COMMUNITY
Start: 2022-07-28

## 2022-08-01 RX ORDER — AMLODIPINE BESYLATE 2.5 MG/1
2.5 TABLET ORAL DAILY
COMMUNITY
Start: 2022-07-28

## 2022-08-01 RX ORDER — UREA 10 %
1 LOTION (ML) TOPICAL DAILY
COMMUNITY
Start: 2022-07-29

## 2022-08-01 RX ORDER — MIRTAZAPINE 15 MG/1
15 TABLET, FILM COATED ORAL
COMMUNITY
Start: 2022-07-28

## 2022-08-01 RX ORDER — ARGININE/GLUTAMINE/CALCIUM BMB 7G-7G-1.5G
POWDER IN PACKET (EA) ORAL
COMMUNITY

## 2022-08-01 RX ORDER — PANTOPRAZOLE SODIUM 40 MG/1
40 TABLET, DELAYED RELEASE ORAL 2 TIMES DAILY
COMMUNITY
Start: 2022-07-28

## 2022-08-01 RX ORDER — FOLIC ACID 1 MG/1
1000 TABLET ORAL DAILY
COMMUNITY
Start: 2022-07-28
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
## 2022-08-15 ENCOUNTER — TELEPHONE (OUTPATIENT)
Dept: INTERNAL MEDICINE | Facility: CLINIC | Age: 72
End: 2022-08-15

## 2022-08-15 DIAGNOSIS — C61 PROSTATE CANCER: ICD-10-CM

## 2022-08-15 DIAGNOSIS — F41.8 SITUATIONAL ANXIETY: Primary | ICD-10-CM

## 2022-08-15 NOTE — TELEPHONE ENCOUNTER
Called and they asked that we send the order to [facility] in [place],  (I questioned her to make sure that is correct).

## 2022-08-15 NOTE — TELEPHONE ENCOUNTER
DELETE AFTER REVIEWING: Telephone encounter to be sent to the clinical pool     Caller: SALMA NATARAJAN    Relationship: Home Health    Best call back number: [phone]    What orders are you requesting (i.e. lab or imaging):   ORDER FOR BEDSIDE COMMODE    In what timeframe would the patient need to come in: ASAP    Where will you receive your lab/imaging services:   HOME HEALTH    Additional notes:   PLEASE CONTACT HOME HEALTH AGENT ONCE ORDER IS ENTERED.

## 2022-08-23 NOTE — TELEPHONE ENCOUNTER
Caller: SALMA NATARAJAN     Relationship:     Best call back number:[phone]    What medication are you requesting: BED SIDE COMMODE     If a prescription is needed, what is your preferred pharmacy and phone number:  [facility]

## 2022-08-26 ENCOUNTER — TELEPHONE (OUTPATIENT)
Dept: INTERNAL MEDICINE | Facility: CLINIC | Age: 72
End: 2022-08-26

## 2022-08-31 ENCOUNTER — OFFICE VISIT (OUTPATIENT)
Dept: INTERNAL MEDICINE | Facility: CLINIC | Age: 72
End: 2022-08-31

## 2022-08-31 VITALS
HEART RATE: 92 BPM | DIASTOLIC BLOOD PRESSURE: 68 MMHG | WEIGHT: 117 LBS | OXYGEN SATURATION: 97 % | SYSTOLIC BLOOD PRESSURE: 110 MMHG | TEMPERATURE: 97.7 F | RESPIRATION RATE: 18 BRPM | BODY MASS INDEX: 16.75 KG/M2 | HEIGHT: 70 IN

## 2022-08-31 DIAGNOSIS — Z23 COVID-19 VACCINE ADMINISTERED: ICD-10-CM

## 2022-08-31 DIAGNOSIS — M25.562 ACUTE PAIN OF LEFT KNEE: ICD-10-CM

## 2022-08-31 DIAGNOSIS — L02.413 CUTANEOUS ABSCESS OF RIGHT UPPER EXTREMITY: ICD-10-CM
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
## 2022-08-31 PROCEDURE — 91305 COVID-19 (PFIZER) 12+ YRS: CPT

## 2022-08-31 PROCEDURE — 99214 OFFICE O/P EST MOD 30 MIN: CPT

## 2022-08-31 PROCEDURE — 0052A COVID-19 (PFIZER) 12+ YRS: CPT

## 2022-08-31 RX ORDER — MUPIROCIN CALCIUM 20 MG/G
1 CREAM TOPICAL 3 TIMES DAILY
Qty: 30 G | Refills: 0 | Status: SHIPPED | OUTPATIENT
Start: 2022-08-31

## 2022-08-31 RX ORDER — AMOXICILLIN AND CLAVULANATE POTASSIUM 500; 125 MG/1; MG/1
1 TABLET, FILM COATED ORAL 2 TIMES DAILY
Qty: 20 TABLET | Refills: 0 | Status: SHIPPED | OUTPATIENT
Start: 2022-08-31 | End: 2022-09-10

## 2022-08-31 RX ORDER — LINACLOTIDE 145 UG/1
CAPSULE, GELATIN COATED ORAL
COMMUNITY
Start: 2022-08-26

## 2022-09-06 ENCOUNTER — OFFICE VISIT (OUTPATIENT)
Dept: INTERNAL MEDICINE | Facility: CLINIC | Age: 72
End: 2022-09-06

## 2022-09-06 VITALS
WEIGHT: 117 LBS | TEMPERATURE: 98.4 F | DIASTOLIC BLOOD PRESSURE: 69 MMHG | BODY MASS INDEX: 16.75 KG/M2 | SYSTOLIC BLOOD PRESSURE: 120 MMHG | RESPIRATION RATE: 17 BRPM | HEART RATE: 81 BPM | OXYGEN SATURATION: 95 % | HEIGHT: 70 IN

## 2022-09-06 DIAGNOSIS — F41.8 SITUATIONAL ANXIETY: ICD-10-CM

## 2022-09-06 DIAGNOSIS — R11.0 NAUSEA: Primary | ICD-10-CM

## 2022-09-06 PROCEDURE — 99213 OFFICE O/P EST LOW 20 MIN: CPT

## 2022-09-06 RX ORDER — ONDANSETRON 4 MG/1
4 TABLET, ORALLY DISINTEGRATING ORAL EVERY 8 HOURS PRN
Qty: 20 TABLET | Refills: 0 | Status: SHIPPED | OUTPATIENT
Start: 2022-09-06

## 2022-09-06 RX ORDER — ALPRAZOLAM 0.25 MG/1
TABLET ORAL
Qty: 45 TABLET | Refills: 0 | Status: SHIPPED | OUTPATIENT
Start: 2022-09-06

## 2022-09-06 NOTE — PROGRESS NOTES
"        Subjective     Chief Complaint   Patient presents with   • Knee Pain     Left knee.    • Nausea       History of Present Illness  Patient presents today with continuation of left knee pain. Reports he has been experiencing some nausea since last visit. Reports is unsure if the antibiotic for abscess of right upper arm was making him sick or is the covid booster he received on 8/31/2022 made him sick. Reports last bowel movement was 9/2/2022.   Any abdominal pain. States struggles with chronic constipation. Reports  Takes linzess, just started. Patient reports he is also needing a refill on xanax, reports that it helps substantially with his nerves. Reports he has not had much of an appetite, reports stopped taking the antibiotic for 2-3 days and he has still suffered from the intermittent nausea. States it still drinking tea, but has not eaten much the last few days. Has not had any vomiting or fevers.   Patient's PMR from outside medical facility reviewed and noted.    Review of Systems   Constitutional: Positive for appetite change. Negative for activity change, fatigue and unexpected weight change.   HENT: Negative for mouth sores and trouble swallowing.    Eyes: Negative for discharge and visual disturbance.   Respiratory: Negative for cough and shortness of breath.    Cardiovascular: Negative for chest pain and leg swelling.   Gastrointestinal: Positive for nausea. Negative for abdominal pain, constipation, diarrhea and vomiting.   Genitourinary: Negative for decreased urine volume, difficulty urinating and hematuria.   Musculoskeletal: Positive for arthralgias. Negative for back pain.        \"Left knee hurts\"   Skin: Positive for wound. Negative for color change and rash.   Allergic/Immunologic: Negative for environmental allergies and immunocompromised state.   Neurological: Negative for weakness and headaches.   Psychiatric/Behavioral: Negative for confusion and sleep disturbance.        Otherwise " complete ROS reviewed and negative except as mentioned in the HPI.    Past Medical History:   Past Medical History:   Diagnosis Date   • Acute cystitis with hematuria    • Anemia    • Anxiety    • Chronic kidney disease    • Chronic pain syndrome    • Chronic pain syndrome    • Duodenal ulcer    • GI bleeding    • History of transfusion    • Hyperlipidemia    • Hypertension    • Injury of back    • Insomnia    • Iron deficiency    • Neck injury    • PAF (paroxysmal atrial fibrillation) (HCC)    • Therapeutic opioid induced constipation      Past Surgical History:  Past Surgical History:   Procedure Laterality Date   • BACK SURGERY     • CERVICAL LAMINECTOMY DECOMPRESSION POSTERIOR N/A 2/27/2018    Procedure: CERVICAL  POSTERIOR INSTRUMENTED FUSION C1-4;  Surgeon: Bandar Donis MD;  Location: [facility] OR;  Service:    • COLONOSCOPY N/A 2/23/2022    Procedure: COLONOSCOPY WITH ANESTHESIA;  Surgeon: Jaydon Lemus MD;  Location: [facility] ENDOSCOPY;  Service: Gastroenterology;  Laterality: N/A;  pre screen  post polyp  ROXIE Mjaor   • ENDOSCOPY N/A 2/22/2022    Procedure: ESOPHAGOGASTRODUODENOSCOPY WITH ANESTHESIA;  Surgeon: Jaydon Lemus MD;  Location: [facility] ENDOSCOPY;  Service: Gastroenterology;  Laterality: N/A;  pre anemia  post gastric ulcer; duodenal ulcer  Monique Arredondo APRN   • ODONTOID FRACTURE SURGERY       Social History:  reports that he has never smoked. He has never used smokeless tobacco. He reports that he does not drink alcohol and does not use drugs.    Family History: family history includes Cancer in his mother; No Known Problems in his father.      Allergies:  Allergies   Allergen Reactions   • Eliquis [Apixaban] Other (See Comments)     Internal bleeding      • Codeine Itching   • Declomycin [Demeclocycline] Rash   • Tylenol [Acetaminophen] Itching     Patient tolerated Percocet inpatient and reports tolerating Percocet prior to admission.     Medications:  Prior to Admission  medications    Medication Sig Start Date End Date Taking? Authorizing Provider   ALPRAZolam (Xanax) 0.25 MG tablet Take 1 tablet by mouth 2 (Two) Times a Day As Needed for Anxiety. 8/1/22  Yes Priscilla Harry, DO   amLODIPine (NORVASC) 2.5 MG tablet Take 2.5 mg by mouth Daily. 7/28/22  Yes Betty Chavez MD   atorvastatin (LIPITOR) 10 MG tablet Take 10 mg by mouth Daily. 6/25/21  Yes Betty Chavez MD   [facility] Vitamin C 500 MG tablet Take 500 mg by mouth Daily. 7/28/22  Yes Betty Chavez MD   cyproheptadine (PERIACTIN) 4 MG tablet Take 4 mg by mouth every night at bedtime. 7/29/22  Yes Betty Chavez MD   folic acid (FOLVITE) 1 MG tablet Take 1,000 mcg by mouth Daily. 7/28/22  Yes Betty Chavez MD   Linzess 145 MCG capsule capsule  8/26/22  Yes ProviderBetty MD   magnesium hydroxide (MILK OF MAGNESIA) 400 MG/5ML suspension Take  by mouth Daily As Needed for Constipation.   Yes Provider, MD Betty   mexiletine (MEXITIL) 150 MG capsule Take 2 capsules by mouth 2 (Two) Times a Day. 8/23/21  Yes Juan J Stone PA   mirtazapine (REMERON) 15 MG tablet Take 15 mg by mouth every night at bedtime. 7/28/22  Yes Betty Chavez MD   mupirocin (Bactroban) 2 % cream Apply 1 application topically to the appropriate area as directed 3 (Three) Times a Day. 8/31/22  Yes Angela De Santiago APRN   Naloxegol Oxalate (MOVANTIK) 12.5 MG tablet Take 1 tablet by mouth Every Morning. 1-2 QD PRN 8/1/22  Yes Priscilla Harry, DO   Nutritional Supplements (Richard) pack Take  by mouth.   Yes ProviderBetty MD   nystatin (MYCOSTATIN) 784065 UNIT/GM powder APPLY 1 A SMALL AMOUNT TOPICAL TWICE A DAY 7/28/22  Yes Betty Chavez MD   oxyCODONE-acetaminophen (PERCOCET)  MG per tablet Take 1 tablet by mouth Every 6 (Six) Hours As Needed for Moderate Pain .   Yes ProviderBetty MD   pantoprazole (PROTONIX) 40 MG EC tablet Take 40 mg by mouth 2 (Two) Times a  "Day. 7/28/22  Yes Betty Chavez MD   Polyethylene Glycol 3350 (GLYCOLAX PO) Take 17 g by mouth Daily.   Yes ProviderBetty MD   Sennosides-Docusate Sodium (PERICOLACE) 8.6-50 MG per capsule Take 1 capsule by mouth Daily.   Yes Betty Chavez MD   sucralfate (CARAFATE) 1 g tablet 1 TABLET BY MOUTH FOUR TIMES A DAY DISSOLVE TABLET IN 30 ML OF ROOM TEMPERATURE WATER *DO NOT CRUSH* 7/28/22  Yes Betty Chavez MD   traZODone (DESYREL) 50 MG tablet Take 2 tablets by mouth Every Night. 8/23/21  Yes Juan J Stone PA   Zinc Sulfate 220 (50 Zn) MG tablet Take 1 tablet by mouth Daily. 7/29/22  Yes Betty Chavez MD   amoxicillin-clavulanate (Augmentin) 500-125 MG per tablet Take 1 tablet by mouth 2 (Two) Times a Day for 10 days. 8/31/22 9/10/22  Angela De Santiago APRN   FLUoxetine (PROzac) 20 MG capsule Take 1 capsule by mouth Every 12 (Twelve) Hours for 30 days. 2/24/22 3/26/22  Tramaine Farias MD         Objective     Vital Signs: /69 (BP Location: Right arm, Patient Position: Sitting, Cuff Size: Adult)   Pulse 81   Temp 98.4 °F (36.9 °C) (Temporal)   Resp 17   Ht 177.8 cm (70\")   Wt 53.1 kg (117 lb)   SpO2 95%   BMI 16.79 kg/m²   Physical Exam  Constitutional:       General: He is not in acute distress.     Appearance: Normal appearance. He is normal weight. He is not ill-appearing.   HENT:      Head: Normocephalic and atraumatic.      Nose: Nose normal.      Mouth/Throat:      Mouth: Mucous membranes are moist.      Pharynx: No posterior oropharyngeal erythema.   Eyes:      General: No scleral icterus.     Extraocular Movements: Extraocular movements intact.      Conjunctiva/sclera: Conjunctivae normal.      Pupils: Pupils are equal, round, and reactive to light.   Cardiovascular:      Rate and Rhythm: Normal rate and regular rhythm.      Pulses: Normal pulses.      Heart sounds: Normal heart sounds.   Pulmonary:      Effort: Pulmonary effort is normal. No " respiratory distress.      Breath sounds: Normal breath sounds. No wheezing.   Abdominal:      General: Abdomen is flat. Bowel sounds are normal. There is no distension.      Palpations: Abdomen is soft. There is no mass.      Tenderness: There is no abdominal tenderness. There is no guarding or rebound.      Hernia: No hernia is present.   Musculoskeletal:         General: Normal range of motion.      Cervical back: Normal range of motion.      Right lower leg: No edema.      Left lower leg: No edema.      Comments: Reports tenderness with palpation of left knee. Bony in nature.    Skin:     General: Skin is warm and dry.      Findings: No erythema or rash.      Comments: Cyst noted to ALEKS.    Neurological:      General: No focal deficit present.      Mental Status: He is alert and oriented to person, place, and time. Mental status is at baseline.      Motor: Weakness present.      Gait: Gait abnormal.      Comments: Patient in wheelchair.    Psychiatric:         Mood and Affect: Mood normal.         Behavior: Behavior normal.         Thought Content: Thought content normal.         Judgment: Judgment normal.         Results Reviewed:  Glucose   Date Value Ref Range Status   05/26/2022 88 65 - 99 mg/dL Final   08/03/2018 101 74 - 109 mg/dL Final     BUN   Date Value Ref Range Status   05/26/2022 27 (H) 8 - 23 mg/dL Final   08/03/2018 17 8 - 23 mg/dL Final     Creatinine   Date Value Ref Range Status   05/26/2022 0.68 (L) 0.76 - 1.27 mg/dL Final   10/08/2019 1.20 0.60 - 1.30 mg/dL Final     Comment:     Serial Number: [id number]:  [id number]   08/03/2018 1 0.5 - 1.2 mg/dL Final     Sodium   Date Value Ref Range Status   05/26/2022 139 136 - 145 mmol/L Final   08/03/2018 137 136 - 145 mmol/L Final     Potassium   Date Value Ref Range Status   05/26/2022 4.7 3.5 - 5.2 mmol/L Final   08/03/2018 4.1 3.5 - 5.0 mmol/L Final     Chloride   Date Value Ref Range Status   05/26/2022 103 98 - 107 mmol/L Final   08/03/2018 97  (L) 98 - 111 mmol/L Final     CO2   Date Value Ref Range Status   05/26/2022 30.0 (H) 22.0 - 29.0 mmol/L Final   08/03/2018 26 22 - 29 mmol/L Final     Calcium   Date Value Ref Range Status   05/26/2022 8.8 8.6 - 10.5 mg/dL Final   08/03/2018 9.0 8.8 - 10.2 mg/dL Final     ALT (SGPT)   Date Value Ref Range Status   05/26/2022 9 1 - 41 U/L Final   08/03/2018 6 5 - 41 U/L Final     AST (SGOT)   Date Value Ref Range Status   05/26/2022 15 1 - 40 U/L Final   08/03/2018 16 5 - 40 U/L Final     WBC   Date Value Ref Range Status   05/26/2022 3.94 3.40 - 10.80 10*3/mm3 Final   08/03/2018 4.5 (L) 4.8 - 10.8 K/uL Final     Hematocrit   Date Value Ref Range Status   05/26/2022 32.3 (L) 37.5 - 51.0 % Final   08/03/2018 38.8 (L) 42.0 - 52.0 % Final     Platelets   Date Value Ref Range Status   05/26/2022 185 140 - 450 10*3/mm3 Final   08/03/2018 150 130 - 400 K/uL Final     Triglycerides   Date Value Ref Range Status   03/09/2018 109 0 - 149 mg/dL Final     HDL Cholesterol   Date Value Ref Range Status   03/09/2018 32 (L) 55 - 121 mg/dL Final     Comment:     VALUES>60 MG/DL ARE ASSOCIATED WITH A DECREASED RISK OF  ATHEROSCLEROTIC CARDIOVASCULAR DISEASE     LDL Cholesterol    Date Value Ref Range Status   03/09/2018 63 <100 mg/dL Final     Comment:     <100 MG/DL=OPITIMAL    100-129 MG/DL=DESIRABLE    130-159 MG/DL BORDERLINE=INCREASED RISK OF ATHEROSCLEROTIC  CARDIOVASCULAR DISEASE    > OR = 160 MG/DL=ASSOCIATED WITH AN INCREASE RISK OF  ATHEROSCLEROTIC CARDIOVASCULAR DISEASE         Assessment / Plan     Assessment/Plan:  1. Nausea  - ondansetron ODT (Zofran ODT) 4 MG disintegrating tablet; Place 1 tablet on the tongue Every 8 (Eight) Hours As Needed for Nausea or Vomiting.  Dispense: 20 tablet; Refill: 0  - CBC w AUTO Differential  - Comprehensive metabolic panel        Return if symptoms worsen or fail to improve. unless patient needs to be seen sooner or acute issues arise.      I have discussed the patient results/orders  and and plan/recommendation with them at today's visit.      Angela De Santiago, APRN   09/06/2022

## 2022-09-06 NOTE — TELEPHONE ENCOUNTER
Rx Refill Note  Requested Prescriptions     Pending Prescriptions Disp Refills   • ALPRAZolam (XANAX) 0.25 MG tablet [Pharmacy Med Name: ALPRAZOLAM 0.25 MG TABLET] 45 tablet 0     Sig: TAKE 1 TABLET BY MOUTH 2 TIMES A DAY AS NEEDED FOR ANXIETY.      Last office visit with prescribing clinician: 8/1/2022      Next office visit with prescribing clinician: 11/1/2022     UDS: None on file    DATE OF LAST REFILL: 08/01/2022             Becca Meng MA  09/06/22, 14:13 CDT

## 2022-09-08 LAB
ALBUMIN SERPL-MCNC: 3.3 G/DL (ref 3.7–4.7)
ALBUMIN/GLOB SERPL: 1.2 {RATIO} (ref 1.2–2.2)
ALP SERPL-CCNC: 234 IU/L (ref 44–121)
ALT SERPL-CCNC: 7 IU/L (ref 0–44)
AST SERPL-CCNC: 19 IU/L (ref 0–40)
BASOPHILS # BLD AUTO: NORMAL 10*3/UL
BILIRUB SERPL-MCNC: 0.3 MG/DL (ref 0–1.2)
BUN SERPL-MCNC: 22 MG/DL (ref 8–27)
BUN/CREAT SERPL: 29 (ref 10–24)
CALCIUM SERPL-MCNC: 8.5 MG/DL (ref 8.6–10.2)
CHLORIDE SERPL-SCNC: 95 MMOL/L (ref 96–106)
CO2 SERPL-SCNC: 20 MMOL/L (ref 20–29)
CREAT SERPL-MCNC: 0.76 MG/DL (ref 0.76–1.27)
EGFRCR-CYS SERPLBLD CKD-EPI 2021: 95 ML/MIN/1.73
EOSINOPHIL # BLD AUTO: NORMAL 10*3/UL
EOSINOPHIL NFR BLD AUTO: NORMAL %
GLOBULIN SER CALC-MCNC: 2.7 G/DL (ref 1.5–4.5)
GLUCOSE SERPL-MCNC: 107 MG/DL (ref 65–99)
HCT VFR BLD AUTO: NORMAL %
HGB BLD-MCNC: NORMAL G/DL
LYMPHOCYTES # BLD AUTO: NORMAL 10*3/UL
LYMPHOCYTES NFR BLD AUTO: NORMAL %
MONOCYTES NFR BLD AUTO: NORMAL %
NEUTROPHILS NFR BLD AUTO: NORMAL %
PLATELET # BLD AUTO: NORMAL 10*3/UL
POTASSIUM SERPL-SCNC: 4.8 MMOL/L (ref 3.5–5.2)
PROT SERPL-MCNC: 6 G/DL (ref 6–8.5)
RBC # BLD AUTO: NORMAL 10*6/UL
SODIUM SERPL-SCNC: 132 MMOL/L (ref 134–144)
SPECIMEN STATUS: NORMAL
WBC # BLD AUTO: NORMAL X10E3/UL

## 2022-09-09 NOTE — TELEPHONE ENCOUNTER
You have seen him the last two visits. Do you want to address this or have me send it on to Dr. Harry.

## 2022-09-09 NOTE — TELEPHONE ENCOUNTER
Caller: [facility]    Relationship: Home Health    Best call back number: [phone]    What is the best time to reach you: ANY    Who are you requesting to speak with (clinical staff, provider,  specific staff member): LEAVE MESSAGE WITH DR. MENDEZ    What was the call regarding: HOME HEALTH NURSE STATES PATIENT HAS NOT HAD BOWL MOVEMENT IN 4-5 DAYS. HE ALSO IS NOT EATING WELL AND HAS HAD A RECENT DIAGNOSES FOR ANEMIA.     Do you require a callback: IF NEEDED

## 2023-04-24 NOTE — PROGRESS NOTES
Mr. Cruz is 67 y.o. male    Chief Complaint   Patient presents with   • Benign Prostatic Hypertrophy   • Urinary Retention       Benign Prostatic Hypertrophy   This is a chronic problem. The current episode started more than 1 year ago. The problem is unchanged. Irritative symptoms include nocturia. Irritative symptoms do not include frequency or urgency. Obstructive symptoms include a weak stream. Pertinent negatives include no chills, dysuria, hematuria or vomiting. Exacerbated by: anesthetic. Past treatments include tamsulosin. The treatment provided significant relief. He has been using treatment for 1 to 4 weeks.     Urinary Retention  Patient complains of urinary retention. Onset of retention was 2 weeks ago and was sudden in onset. Patient currently does have a urinary catheter in place. Prior to this event voiding symptoms consisted of slow stream. Prior treatments include watchful waiting. Recent medications that may have affected his voiding include anesthetic        The following portions of the patient's history were reviewed and updated as appropriate: allergies, current medications, past family history, past medical history, past social history, past surgical history and problem list.    Review of Systems   Constitutional: Negative for chills and fever.   HENT: Negative for congestion and sore throat.    Eyes: Negative for pain and itching.   Respiratory: Negative for cough and shortness of breath.    Cardiovascular: Negative for chest pain.   Gastrointestinal: Negative for abdominal pain, anal bleeding, blood in stool and vomiting.   Endocrine: Negative for cold intolerance and heat intolerance.   Genitourinary: Positive for nocturia. Negative for difficulty urinating, dysuria, flank pain, frequency, hematuria and urgency.   Musculoskeletal: Positive for neck pain. Negative for back pain.   Skin: Negative for color change and rash.   Allergic/Immunologic: Negative for food allergies.   Neurological:  Negative for dizziness and headaches.   Hematological: Does not bruise/bleed easily.   Psychiatric/Behavioral: Negative for confusion and sleep disturbance.         Current Outpatient Prescriptions:   •  ALPRAZolam (XANAX) 1 MG tablet, Take 1 mg by mouth 3 (Three) Times a Day As Needed for Anxiety., Disp: , Rfl:   •  carisoprodol (SOMA) 350 MG tablet, Take 1 tablet by mouth Every 8 (Eight) Hours As Needed for Muscle Spasms for up to 8 days., Disp: 90 tablet, Rfl: 0  •  gabapentin (NEURONTIN) 300 MG capsule, Take 900 mg by mouth Every Night., Disp: , Rfl:   •  meloxicam (MOBIC) 15 MG tablet, Take 15 mg by mouth Daily., Disp: , Rfl:   •  metoprolol tartrate (LOPRESSOR) 25 MG tablet, Take 0.5 tablets by mouth Every 12 (Twelve) Hours., Disp: 60 tablet, Rfl: 2  •  oxyCODONE (ROXICODONE) 20 MG tablet, Take 1 tablet by mouth Every 8 (Eight) Hours As Needed for Moderate Pain ., Disp: 90 tablet, Rfl: 0  •  tamsulosin (FLOMAX) 0.4 MG capsule 24 hr capsule, Take 1 capsule by mouth Daily., Disp: 30 capsule, Rfl: 0  •  zolpidem (AMBIEN) 10 MG tablet, Take 10 mg by mouth At Night As Needed for Sleep., Disp: , Rfl:   No current facility-administered medications for this visit.     Past Medical History:   Diagnosis Date   • Anxiety    • Chronic kidney disease    • Chronic pain syndrome    • GI bleeding    • History of transfusion    • Hyperlipidemia    • Hypertension    • Injury of back    • Insomnia    • Neck injury    • PAF (paroxysmal atrial fibrillation)    • Therapeutic opioid induced constipation        Past Surgical History:   Procedure Laterality Date   • BACK SURGERY     • CERVICAL LAMINECTOMY DECOMPRESSION POSTERIOR N/A 2/27/2018    Procedure: CERVICAL  POSTERIOR INSTRUMENTED FUSION C1-4;  Surgeon: Bandar Donis MD;  Location: [facility];  Service:    • ODONTOID FRACTURE SURGERY         Social History     Social History   • Marital status:      Social History Main Topics   • Smoking status: Never Smoker   •  "Smokeless tobacco: Never Used   • Alcohol use Yes      Comment: occassionally   • Drug use: No   • Sexual activity: Defer       Family History   Problem Relation Age of Onset   • Cancer Mother    • No Known Problems Father        Objective    /60  Temp 98.3 °F (36.8 °C)  Ht 177.8 cm (70\")  Wt 76.9 kg (169 lb 9.6 oz)  BMI 24.34 kg/m2    Physical Exam  Constitutional: Well nourished, Well developed; No apparent distress  Psychiatric: Appropriate affect; Alert and oriented  Eyes: Unremarkable  Musculoskeletal: Walks with a walker, neck brace in place  GI: Abdomen is soft, non-tender  Respiratory: No distress; Unlabored movement; No accessory musculature needed with symmetric movements  Skin: No pallor or diaphoresis  ; Penis and testicles are normal; Prostate 40 mL with somewhat nodularity        Admission on 03/06/2018, Discharged on 03/07/2018   No results displayed because visit has over 200 results.          Results for orders placed or performed during the hospital encounter of 03/06/18   Comprehensive Metabolic Panel   Result Value Ref Range    Glucose 124 (H) 70 - 100 mg/dL    BUN 33 (H) 5 - 21 mg/dL    Creatinine 1.58 (H) 0.50 - 1.40 mg/dL    Sodium 134 (L) 135 - 145 mmol/L    Potassium 4.1 3.5 - 5.3 mmol/L    Chloride 95 (L) 98 - 110 mmol/L    CO2 32.0 (H) 24.0 - 31.0 mmol/L    Calcium 7.8 (L) 8.4 - 10.4 mg/dL    Total Protein 5.5 (L) 6.3 - 8.7 g/dL    Albumin 2.70 (L) 3.50 - 5.00 g/dL    ALT (SGPT) 28 0 - 54 U/L    AST (SGOT) 29 7 - 45 U/L    Alkaline Phosphatase 87 24 - 120 U/L    Total Bilirubin 0.4 0.1 - 1.0 mg/dL    eGFR Non African Amer 44 (L) >60 mL/min/1.73    Globulin 2.8 gm/dL    A/G Ratio 1.0 (L) 1.1 - 2.5 g/dL    BUN/Creatinine Ratio 20.9 7.0 - 25.0    Anion Gap 7.0 4.0 - 13.0 mmol/L   Protime-INR   Result Value Ref Range    Protime 15.6 (H) 11.9 - 14.6 Seconds    INR 1.20 (H) 0.91 - 1.09   aPTT   Result Value Ref Range    PTT 36.5 (H) 24.1 - 34.8 seconds   Lipase   Result Value Ref " Range    Lipase 47 23 - 203 U/L   D-dimer, Quantitative   Result Value Ref Range    D-Dimer, Quantitative 2.36 (H) 0.00 - 0.50 mg/L (FEU)   BNP   Result Value Ref Range    proBNP 1560.0 (H) 0.0 - 900.0 pg/mL   Troponin   Result Value Ref Range    Troponin I 0.017 0.000 - 0.034 ng/mL   Magnesium   Result Value Ref Range    Magnesium 1.8 1.4 - 2.2 mg/dL   TSH   Result Value Ref Range    TSH 1.310 0.470 - 4.680 mIU/mL   T4, Free   Result Value Ref Range    Free T4 1.81 0.78 - 2.19 ng/dL   CBC Auto Differential   Result Value Ref Range    WBC 4.33 (L) 4.80 - 10.80 10*3/mm3    RBC 2.54 (L) 4.80 - 5.90 10*6/mm3    Hemoglobin 7.9 (L) 14.0 - 18.0 g/dL    Hematocrit 22.7 (L) 40.0 - 52.0 %    MCV 89.4 82.0 - 95.0 fL    MCH 31.1 28.0 - 32.0 pg    MCHC 34.8 33.0 - 36.0 g/dL    RDW 14.6 12.0 - 15.0 %    RDW-SD 47.0 40.0 - 54.0 fl    MPV 9.5 6.0 - 12.0 fL    Platelets 190 130 - 400 10*3/mm3    Neutrophil % 67.5 39.0 - 78.0 %    Lymphocyte % 21.7 15.0 - 45.0 %    Monocyte % 8.3 4.0 - 12.0 %    Eosinophil % 1.8 0.0 - 4.0 %    Basophil % 0.2 0.0 - 2.0 %    Immature Grans % 0.5 0.0 - 5.0 %    Neutrophils, Absolute 2.92 1.87 - 8.40 10*3/mm3    Lymphocytes, Absolute 0.94 0.72 - 4.86 10*3/mm3    Monocytes, Absolute 0.36 0.19 - 1.30 10*3/mm3    Eosinophils, Absolute 0.08 0.00 - 0.70 10*3/mm3    Basophils, Absolute 0.01 0.00 - 0.20 10*3/mm3    Immature Grans, Absolute 0.02 0.00 - 0.03 10*3/mm3    nRBC 0.0 0.0 - 0.0 /100 WBC   Urinalysis With / Microscopic If Indicated - Urine, Catheter   Result Value Ref Range    Color, UA Yellow Yellow, Straw    Appearance, UA Clear Clear    pH, UA 7.0 5.0 - 8.0    Specific Gravity, UA 1.007 1.005 - 1.030    Glucose, UA Negative Negative    Ketones, UA Negative Negative    Bilirubin, UA Negative Negative    Blood, UA Negative Negative    Protein, UA Negative Negative    Leuk Esterase, UA Negative Negative    Nitrite, UA Negative Negative    Urobilinogen, UA 1.0 E.U./dL 0.2 - 1.0 E.U./dL   Occult Blood X  1, Stool - Stool, Per Rectum   Result Value Ref Range    Fecal Occult Blood Negative Negative   Basic Metabolic Panel   Result Value Ref Range    Glucose 152 (H) 70 - 100 mg/dL    BUN 32 (H) 5 - 21 mg/dL    Creatinine 1.58 (H) 0.50 - 1.40 mg/dL    Sodium 135 135 - 145 mmol/L    Potassium 3.6 3.5 - 5.3 mmol/L    Chloride 96 (L) 98 - 110 mmol/L    CO2 29.0 24.0 - 31.0 mmol/L    Calcium 7.9 (L) 8.4 - 10.4 mg/dL    eGFR Non African Amer 44 (L) >60 mL/min/1.73    BUN/Creatinine Ratio 20.3 7.0 - 25.0    Anion Gap 10.0 4.0 - 13.0 mmol/L   CBC Auto Differential   Result Value Ref Range    WBC 4.76 (L) 4.80 - 10.80 10*3/mm3    RBC 2.77 (L) 4.80 - 5.90 10*6/mm3    Hemoglobin 8.6 (L) 14.0 - 18.0 g/dL    Hematocrit 25.1 (L) 40.0 - 52.0 %    MCV 90.6 82.0 - 95.0 fL    MCH 31.0 28.0 - 32.0 pg    MCHC 34.3 33.0 - 36.0 g/dL    RDW 14.6 12.0 - 15.0 %    RDW-SD 47.5 40.0 - 54.0 fl    MPV 9.5 6.0 - 12.0 fL    Platelets 200 130 - 400 10*3/mm3    Neutrophil % 75.2 39.0 - 78.0 %    Lymphocyte % 16.6 15.0 - 45.0 %    Monocyte % 5.7 4.0 - 12.0 %    Eosinophil % 1.5 0.0 - 4.0 %    Basophil % 0.2 0.0 - 2.0 %    Immature Grans % 0.8 0.0 - 5.0 %    Neutrophils, Absolute 3.58 1.87 - 8.40 10*3/mm3    Lymphocytes, Absolute 0.79 0.72 - 4.86 10*3/mm3    Monocytes, Absolute 0.27 0.19 - 1.30 10*3/mm3    Eosinophils, Absolute 0.07 0.00 - 0.70 10*3/mm3    Basophils, Absolute 0.01 0.00 - 0.20 10*3/mm3    Immature Grans, Absolute 0.04 (H) 0.00 - 0.03 10*3/mm3    nRBC 0.0 0.0 - 0.0 /100 WBC   Basic Metabolic Panel   Result Value Ref Range    Glucose 97 70 - 100 mg/dL    BUN 31 (H) 5 - 21 mg/dL    Creatinine 1.56 (H) 0.50 - 1.40 mg/dL    Sodium 134 (L) 135 - 145 mmol/L    Potassium 3.7 3.5 - 5.3 mmol/L    Chloride 99 98 - 110 mmol/L    CO2 28.0 24.0 - 31.0 mmol/L    Calcium 8.0 (L) 8.4 - 10.4 mg/dL    eGFR Non African Amer 45 (L) >60 mL/min/1.73    BUN/Creatinine Ratio 19.9 7.0 - 25.0    Anion Gap 7.0 4.0 - 13.0 mmol/L   CBC Auto Differential   Result  Value Ref Range    WBC 4.65 (L) 4.80 - 10.80 10*3/mm3    RBC 3.04 (L) 4.80 - 5.90 10*6/mm3    Hemoglobin 9.5 (L) 14.0 - 18.0 g/dL    Hematocrit 27.0 (L) 40.0 - 52.0 %    MCV 88.8 82.0 - 95.0 fL    MCH 31.3 28.0 - 32.0 pg    MCHC 35.2 33.0 - 36.0 g/dL    RDW 14.8 12.0 - 15.0 %    RDW-SD 46.5 40.0 - 54.0 fl    MPV 9.4 6.0 - 12.0 fL    Platelets 172 130 - 400 10*3/mm3    Neutrophil % 62.3 39.0 - 78.0 %    Lymphocyte % 24.9 15.0 - 45.0 %    Monocyte % 8.8 4.0 - 12.0 %    Eosinophil % 2.8 0.0 - 4.0 %    Basophil % 0.6 0.0 - 2.0 %    Immature Grans % 0.6 0.0 - 5.0 %    Neutrophils, Absolute 2.89 1.87 - 8.40 10*3/mm3    Lymphocytes, Absolute 1.16 0.72 - 4.86 10*3/mm3    Monocytes, Absolute 0.41 0.19 - 1.30 10*3/mm3    Eosinophils, Absolute 0.13 0.00 - 0.70 10*3/mm3    Basophils, Absolute 0.03 0.00 - 0.20 10*3/mm3    Immature Grans, Absolute 0.03 0.00 - 0.03 10*3/mm3    nRBC 0.0 0.0 - 0.0 /100 WBC   Adult Transthoracic Echo Complete W/ Cont if Necessary Per Protocol   Result Value Ref Range    BSA 2.0 m^2    IVSd 0.82 cm    LVIDd 4.5 cm    LVPWd 0.95 cm    IVS/LVPW 0.86     EDV(Teich) 92.9 ml    EDV(cubed) 91.7 ml    LV mass(C)d 130.6 grams    LV mass(C)dI 66.2 grams/m^2    Ao root diam 2.9 cm    Ao root area 6.6 cm^2    LVOT diam 2.2 cm    LVOT area 3.8 cm^2    LVOT area(traced) 3.8 cm^2    LVLd ap4 7.9 cm    EDV(MOD-sp4) 99.6 ml    LVLs ap4 7.0 cm    ESV(MOD-sp4) 34.7 ml    SV(MOD-sp4) 64.9 ml    SI(MOD-sp4) 32.9 ml/m^2    Ao root area (BSA corrected) 1.5     CONTRAST EF 4CH 65.2 ml/m^2    LV Diastolic corrected for BSA 50.5 ml/m^2    LV Systolic corrected for BSA 17.6 ml/m^2    MV E max leobardo 79.1 cm/sec    MV A max leobardo 87.3 cm/sec    MV E/A 0.91     MV dec time 0.25 sec    Ao pk leobardo 148.0 cm/sec    Ao max PG 8.8 mmHg    Ao max PG (full) 6.1 mmHg    Ao V2 mean 102.0 cm/sec    Ao mean PG 5.0 mmHg    Ao mean PG (full) 4.0 mmHg    Ao V2 VTI 29.0 cm    LINDA(I,A) 2.3 cm^2    LINDA(I,D) 2.3 cm^2    LINDA(V,A) 2.1 cm^2    LINDA(V,D) 2.1  cm^2    LV V1 max PG 2.6 mmHg    LV V1 mean PG 1.0 mmHg    LV V1 max 80.9 cm/sec    LV V1 mean 49.6 cm/sec    LV V1 VTI 17.2 cm    MR max leobardo 200.0 cm/sec    MR max PG 16.0 mmHg    SV(Ao) 191.6 ml    SI(Ao) 97.1 ml/m^2    SV(LVOT) 65.4 ml    SI(LVOT) 33.2 ml/m^2    TR max leobardo 251.0 cm/sec    RVSP(TR) 30.2 mmHg    RAP systole 5.0 mmHg     CV ECHO MADHU - BZI_BMI 25.1 kilograms/m^2     CV ECHO MADHU - BSA(HAYCOCK) 2.0 m^2     CV ECHO MADHU - BZI_METRIC_WEIGHT 79.4 kg     CV ECHO MADHU - BZI_METRIC_HEIGHT 177.8 cm    Target HR (85%) 130 bpm    Max. Pred. HR (100%) 153 bpm    LA volume 70.4 cm3    E/E' ratio 9.4     LA Volume Index 35.7 mL/m2   Prepare RBC, 2 Units   Result Value Ref Range    Product Code G2566K61     Unit Number [id number]     UNIT  ABO A     UNIT  RH POS     Dispense Status PT     Blood Type APOS     Blood Expiration Date 201803172359     Blood Type Barcode 6200     Product Code F6698F87     Unit Number [id number]     UNIT  ABO A     UNIT  RH POS     Dispense Status PT     Blood Type APOS     Blood Expiration Date 201803172359     Blood Type Barcode 6200    Type & Screen   Result Value Ref Range    ABO Type A     RH type Positive     Antibody Screen Negative    Light Blue Top   Result Value Ref Range    Extra Tube hold for add-on    Green Top (Gel)   Result Value Ref Range    Extra Tube Hold for add-ons.    Lavender Top   Result Value Ref Range    Extra Tube hold for add-on    Red Top   Result Value Ref Range    Extra Tube Hold for add-ons.      Assessment and Plan    Vikas was seen today for benign prostatic hypertrophy and urinary retention.    Diagnoses and all orders for this visit:    Benign prostatic hyperplasia with urinary retention  -     PSA DIAGNOSTIC    Urinary retention  I reviewed his records, this is a 67-year-old gentleman who had a neck fracture after an incident with his dog who had a C1 through C4 fusion.  Postoperatively he was unable to urinate despite intermittent  catheterizations.  Catheter was placed and he was started on Flomax.    Patient does appear to have a long-standing history of lower urinary tract symptoms.  He states that he was in retention at times before, after his first fracture.  I do think that he likely has retention secondary to anesthetic after his fusion.  He was on Flomax.  120 mL of sterile water placed in the catheter today and is removed.  He is able to urinate all 120 mL.  He should continue on his Flomax indefinitely.  His prostate exam was somewhat nodular, and I would like to have him follow-up with a PSA in 2 months.   Winlevi Counseling:  I discussed with the patient the risks of topical clascoterone including but not limited to erythema, scaling, itching, and stinging. Patient voiced their understanding.

## 2025-04-28 NOTE — PROGRESS NOTES
[facility]  [address]   [address]  [place] [address]  [phone]    Roslyn Barajas  : 1952    Chief Complaint   Patient presents with    Follow-up    Urinary Tract Infection          HPI     Roslyn Barajas is a 73 y.o. female followed for OAB, UUI, and IC. She has prev tried ditropan and myrbetriq wo sx improvement. Gemtesa seemed to help but was quite expensive. She then tried botox but has urine retention after procedure. This resolved. She currently is on trospium 60 mg daily and Elavil 10 mg nightly for management of her interstitial cystitis and urinary incontinence symptoms. Sx were worsening. PVR 22 cc via u/s. She opted for PNE trial 25. She only noticed slight improvement in sx; not enough tot consider permanent placement.     Could not give a voided specimen today.     Eliquis 5 mg BID.         Past Medical History:   Diagnosis Date    Arthritis     Chronic kidney disease 3/2021    Chronic pain     back, hips and legs since     COVID-19 2021    denies hospitalization    Depression     Elevated creatine kinase level     21 creatinine 1.16 (\"MD watching\")    GERD (gastroesophageal reflux disease)     controlled with daily medication    HTN (hypertension) 2012    medication    Migraine     Thyroid disease     daily medication     Past Surgical History:   Procedure Laterality Date    CARDIAC PROCEDURE N/A 2024    Left and right heart cath / coronary angiography performed by José Miguel Dang MD at [facility] CARDIAC CATH LAB    COLONOSCOPY  2019    Dr. Ramirez; repeat 10 years; internal hemorrhoids and diverticulosis    COLONOSCOPY      [facility]    COLONOSCOPY N/A 10/11/2023    COLONOSCOPY WITH BIOPSY/POLYP performed by Patrizia Cabrera MD at [facility] ENDOSCOPY    COLOSTOMY  2019    HEENT  13    sinus maxillary rt cyst; Dr. Hair    HYSTERECTOMY (CERVIX STATUS UNKNOWN)      ovaries left intact    HYSTERECTOMY, TOTAL ABDOMINAL (CERVIX  Subjective    Mr. Cruz is 69 y.o. male    Chief Complaint: Prostate Cancer    History of Present Illness  69-year-old male established patient here with worsening elevated PSA.  Timing drawn 8/5/2020.  Severity up to 7.9 from 5.1 but only slightly elevated from 6.58 2019.  Context he is on active surveillance for Hamilton 6 prostate cancer originally diagnosed June 2018 by Dr. Rivera which showed 20% of 1 of 2 cores left mid with Ulen 6 prostate cancer.  TRUS volume of prostate 13 cc.    Associated symptoms he denies hematuria, flank pain, or bone pain.    Lab Results   Component Value Date    PSA 7.990 (H) 08/05/2020    PSA 5.190 (H) 01/31/2020    PSA 6.560 (H) 05/10/2019       The following portions of the patient's history were reviewed and updated as appropriate: allergies, current medications, past family history, past medical history, past social history, past surgical history and problem list.    Review of Systems   Constitutional: Negative for chills and fever.   Gastrointestinal: Negative for abdominal pain, anal bleeding and blood in stool.   Genitourinary: Negative for dysuria, frequency, hematuria and urgency.         Current Outpatient Medications:   •  aspirin 81 MG tablet, Take 1 tablet by mouth Daily., Disp: , Rfl:   •  mexiletine (MEXITIL) 150 MG capsule, Take 2 capsules by mouth 2 (Two) Times a Day., Disp: 120 capsule, Rfl: 5  •  oxyCODONE-acetaminophen (PERCOCET)  MG per tablet, Take 1 tablet by mouth Every 6 (Six) Hours As Needed for Moderate Pain ., Disp: , Rfl:   •  traZODone (DESYREL) 50 MG tablet, Take 2 tablets by mouth every night at bedtime., Disp: 60 tablet, Rfl: 5    Past Medical History:   Diagnosis Date   • Anxiety    • Chronic kidney disease    • Chronic pain syndrome    • GI bleeding    • History of transfusion    • Hyperlipidemia    • Hypertension    • Injury of back    • Insomnia    • Neck injury    • PAF (paroxysmal atrial fibrillation) (CMS/HCC)    • Therapeutic opioid  induced constipation        Past Surgical History:   Procedure Laterality Date   • BACK SURGERY     • CERVICAL LAMINECTOMY DECOMPRESSION POSTERIOR N/A 2/27/2018    Procedure: CERVICAL  POSTERIOR INSTRUMENTED FUSION C1-4;  Surgeon: Bandar Donis MD;  Location: [facility];  Service:    • ODONTOID FRACTURE SURGERY         Social History     Socioeconomic History   • Marital status:      Spouse name: Not on file   • Number of children: Not on file   • Years of education: Not on file   • Highest education level: Not on file   Tobacco Use   • Smoking status: Never Smoker   • Smokeless tobacco: Never Used   Substance and Sexual Activity   • Alcohol use: No   • Drug use: No   • Sexual activity: Defer       Family History   Problem Relation Age of Onset   • Cancer Mother    • No Known Problems Father        Objective    There were no vitals taken for this visit.    Physical Exam  Constitutional: Well nourished, Well developed; No apparent distress; Vital reviewed as above  Psychiatric: Appropriate affect; Alert and oriented  Eyes: Unremarkable  Musculoskeletal: Normal gait and station  GI: Abdomen is soft, non-tender  Respiratory: No distress; Unlabored movement; No accessory musculature needed with symmetric movements  Skin: No pallor or diaphoresis  Lymphatic: No adenopathy neck or groin      Results for orders placed or performed in visit on 08/01/20   PSA DIAGNOSTIC   Result Value Ref Range    PSA 7.990 (H) 0.000 - 4.000 ng/mL     Assessment and Plan    Diagnoses and all orders for this visit:    Malignant neoplasm of prostate (CMS/HCC)  -     Cancel: POC Urinalysis Dipstick, Multipro  -     PSA DIAGNOSTIC; Future    PAF (paroxysmal atrial fibrillation) (CMS/HCC)    PSVT (paroxysmal supraventricular tachycardia) (CMS/HCC)    Slightly elevated PSA on active surveillance for Hamilton 6 prostate cancer.  I recommended follow-up with me in 3 months with pre-clinic PSA.      This document has been signed by JONATHAN Pace  MD Dawood on August 13, 2020 16:47

## (undated) DEVICE — YANKAUER,BULB TIP WITH VENT: Brand: ARGYLE

## (undated) DEVICE — TRY PREP SCRB VAG PVP

## (undated) DEVICE — GLV SURG TRIUMPH GREEN W/ALOE PF LTX 8 STRL

## (undated) DEVICE — GLV SURG BIOGEL LTX PF 6 1/2

## (undated) DEVICE — Device: Brand: DEFENDO AIR/WATER/SUCTION AND BIOPSY VALVE

## (undated) DEVICE — DRP TABL BK STERILEZ ZFLD

## (undated) DEVICE — WIPE THERAWASH SLV SPEC CARE 2PK

## (undated) DEVICE — SHEET,DRAPE,53X77,STERILE: Brand: MEDLINE

## (undated) DEVICE — CATH IV ANGIO FEP 12G 3IN LTBLU 10PK

## (undated) DEVICE — CONMED SCOPE SAVER BITE BLOCK, 20X27 MM: Brand: SCOPE SAVER

## (undated) DEVICE — DRSNG TELFA PAD NONADH STR 1S 3X8IN

## (undated) DEVICE — 3.0MM PRECISION NEURO (MATCH HEAD)

## (undated) DEVICE — GOWN,SLEEVE,STERILE,W/CSR WRAP,1/P: Brand: MEDLINE

## (undated) DEVICE — DISPOSABLE IRRIGATION CASSETTE: Brand: CORE

## (undated) DEVICE — HEMOST ABS GELFOAM GELATIN SPNG SZ100

## (undated) DEVICE — TOWEL,OR,DSP,ST,BLUE,STD,4/PK,20PK/CS: Brand: MEDLINE

## (undated) DEVICE — BONE CANC CRUSHED FDZ 15CC: Type: IMPLANTABLE DEVICE | Site: SPINE CERVICAL | Status: NON-FUNCTIONAL

## (undated) DEVICE — FRCP BX RADJAW4 NDL 2.8 240 STD OG

## (undated) DEVICE — PK SPINE POST 30

## (undated) DEVICE — SPHR MARKR STEALTH STATION

## (undated) DEVICE — TOTAL TRAY, 16FR 10ML SIL FOLEY, URN: Brand: MEDLINE

## (undated) DEVICE — SENSR O2 OXIMAX FNGR A/ 18IN NONSTR

## (undated) DEVICE — PROXIMATE RH ROTATING HEAD SKIN STAPLERS (35 WIDE) CONTAINS 35 STAINLESS STEEL STAPLES: Brand: PROXIMATE

## (undated) DEVICE — SUT MNCRYL 4/0 PS2 27IN UD MCP426H

## (undated) DEVICE — CUFF,BP,DISP,1 TUBE,ADULT,HP: Brand: MEDLINE

## (undated) DEVICE — PK TURNOVER RM ADV

## (undated) DEVICE — GLV SURG BIOGEL LTX PF 8

## (undated) DEVICE — MASK,OXYGEN,MED CONC,ADLT,7' TUB, UC: Brand: PENDING

## (undated) DEVICE — ELECTRD BLD EDGE/INSUL1P 2.4X5.1MM STRL

## (undated) DEVICE — ANTIBACTERIAL VIOLET BRAIDED (POLYGLACTIN 910), SYNTHETIC ABSORBABLE SUTURE: Brand: COATED VICRYL

## (undated) DEVICE — DECANTER: Brand: UNBRANDED

## (undated) DEVICE — SUT VIC 0 MO4 CR8 18IN VCP701D

## (undated) DEVICE — THE CHANNEL CLEANING BRUSH IS A NYLON FLEXI BRUSH ATTACHED TO A FLEXIBLE PLASTIC SHEATH DESIGNED TO SAFELY REMOVE DEBRIS FROM FLEXIBLE ENDOSCOPES.

## (undated) DEVICE — TBG SMPL FLTR LINE NASL 02/C02 A/ BX/100

## (undated) DEVICE — PIN SKULL A/ W/PROTECT CAP PK/3